# Patient Record
Sex: FEMALE | Race: WHITE | NOT HISPANIC OR LATINO | Employment: OTHER | URBAN - METROPOLITAN AREA
[De-identification: names, ages, dates, MRNs, and addresses within clinical notes are randomized per-mention and may not be internally consistent; named-entity substitution may affect disease eponyms.]

---

## 2017-03-01 RX ORDER — LOVASTATIN 20 MG/1
TABLET ORAL
Qty: 90 TABLET | Refills: 0 | Status: SHIPPED | OUTPATIENT
Start: 2017-03-01 | End: 2017-06-11 | Stop reason: SDUPTHER

## 2017-03-01 RX ORDER — BENAZEPRIL HYDROCHLORIDE 20 MG/1
TABLET ORAL
Qty: 180 TABLET | Refills: 0 | Status: SHIPPED | OUTPATIENT
Start: 2017-03-01 | End: 2017-06-01 | Stop reason: SDUPTHER

## 2017-03-01 RX ORDER — CILOSTAZOL 100 MG/1
TABLET ORAL
Qty: 180 TABLET | Refills: 0 | Status: SHIPPED | OUTPATIENT
Start: 2017-03-01 | End: 2017-06-01 | Stop reason: SDUPTHER

## 2017-03-01 RX ORDER — LEVOTHYROXINE SODIUM 88 UG/1
TABLET ORAL
Qty: 90 TABLET | Refills: 0 | Status: SHIPPED | OUTPATIENT
Start: 2017-03-01 | End: 2017-06-01 | Stop reason: SDUPTHER

## 2017-06-05 RX ORDER — CILOSTAZOL 100 MG/1
TABLET ORAL
Qty: 180 TABLET | Refills: 0 | Status: SHIPPED | OUTPATIENT
Start: 2017-06-05 | End: 2017-09-03 | Stop reason: SDUPTHER

## 2017-06-05 RX ORDER — LEVOTHYROXINE SODIUM 88 UG/1
TABLET ORAL
Qty: 90 TABLET | Refills: 0 | Status: SHIPPED | OUTPATIENT
Start: 2017-06-05 | End: 2017-08-22 | Stop reason: SDUPTHER

## 2017-06-05 RX ORDER — BENAZEPRIL HYDROCHLORIDE 20 MG/1
TABLET ORAL
Qty: 180 TABLET | Refills: 0 | Status: SHIPPED | OUTPATIENT
Start: 2017-06-05 | End: 2017-09-03 | Stop reason: SDUPTHER

## 2017-06-13 RX ORDER — LOVASTATIN 20 MG/1
TABLET ORAL
Qty: 90 TABLET | Refills: 0 | Status: SHIPPED | OUTPATIENT
Start: 2017-06-13 | End: 2017-09-03 | Stop reason: SDUPTHER

## 2017-06-14 RX ORDER — ALENDRONATE SODIUM 70 MG/1
70 TABLET ORAL WEEKLY
Qty: 12 TABLET | Refills: 0 | Status: SHIPPED | OUTPATIENT
Start: 2017-06-14 | End: 2017-09-18 | Stop reason: SDUPTHER

## 2017-07-26 ENCOUNTER — OFFICE VISIT (OUTPATIENT)
Dept: INTERNAL MEDICINE | Facility: CLINIC | Age: 82
End: 2017-07-26
Payer: MEDICARE

## 2017-07-26 ENCOUNTER — HOSPITAL ENCOUNTER (OUTPATIENT)
Dept: RADIOLOGY | Facility: HOSPITAL | Age: 82
Discharge: HOME | End: 2017-07-26
Attending: INTERNAL MEDICINE
Payer: MEDICARE

## 2017-07-26 ENCOUNTER — TELEPHONE (OUTPATIENT)
Dept: INTERNAL MEDICINE | Facility: CLINIC | Age: 82
End: 2017-07-26

## 2017-07-26 VITALS — DIASTOLIC BLOOD PRESSURE: 64 MMHG | OXYGEN SATURATION: 99 % | HEART RATE: 58 BPM | SYSTOLIC BLOOD PRESSURE: 154 MMHG

## 2017-07-26 DIAGNOSIS — E21.3 HYPERPARATHYROIDISM: Primary | ICD-10-CM

## 2017-07-26 DIAGNOSIS — I10 ESSENTIAL HYPERTENSION: ICD-10-CM

## 2017-07-26 DIAGNOSIS — M19.90 ARTHRITIS: ICD-10-CM

## 2017-07-26 DIAGNOSIS — E03.9 ACQUIRED HYPOTHYROIDISM: ICD-10-CM

## 2017-07-26 DIAGNOSIS — I34.0 MITRAL VALVE INSUFFICIENCY, UNSPECIFIED ETIOLOGY: ICD-10-CM

## 2017-07-26 PROCEDURE — 1159F MED LIST DOCD IN RCRD: CPT | Mod: ,,, | Performed by: INTERNAL MEDICINE

## 2017-07-26 PROCEDURE — 1157F ADVNC CARE PLAN IN RCRD: CPT | Mod: ,,, | Performed by: INTERNAL MEDICINE

## 2017-07-26 PROCEDURE — 73560 X-RAY EXAM OF KNEE 1 OR 2: CPT | Mod: TC,LT

## 2017-07-26 PROCEDURE — 99999 PR PBB SHADOW E&M-EST. PATIENT-LVL III: CPT | Mod: PBBFAC,,, | Performed by: INTERNAL MEDICINE

## 2017-07-26 PROCEDURE — 1125F AMNT PAIN NOTED PAIN PRSNT: CPT | Mod: ,,, | Performed by: INTERNAL MEDICINE

## 2017-07-26 PROCEDURE — 99214 OFFICE O/P EST MOD 30 MIN: CPT | Mod: S$PBB,,, | Performed by: INTERNAL MEDICINE

## 2017-07-26 PROCEDURE — 73560 X-RAY EXAM OF KNEE 1 OR 2: CPT | Mod: 26,LT,, | Performed by: RADIOLOGY

## 2017-07-26 NOTE — TELEPHONE ENCOUNTER
----- Message from Mark Seaman Jr., MD sent at 7/26/2017  3:41 PM CDT -----  Please contact the patient and report that the labs were unremarkable.  The x-ray continues to show significant arthritis in the knee, but no fracture or tumor.  There was no fluid in the knee.  Please also encourage the patient to participate in my [facility].

## 2017-07-26 NOTE — PROGRESS NOTES
Subjective:       Patient ID: Jayda Rendon is a 89 y.o. female.    Chief Complaint: Annual Exam    HPI the patient is a 89-year-old female comes in for general checkup.  Her main difficulty at this time is that of arthritic problems in her left knee causing it to give way periodically.  She uses a knee brace.  She has a walker which she uses.  However when she begins to go down she will slowly catch herself as she drops to the floor.  She has not hit her head or injured herself with these events.  The patient reports multiple home blood pressure readings in the range of 120/60.  She is brought in here by her family, Ez and Patricia.  Review of Systems   Constitutional: Negative for fatigue, fever and unexpected weight change.   HENT: Positive for hearing loss. Negative for congestion and sore throat.    Eyes: Negative for visual disturbance.   Respiratory: Negative for cough, shortness of breath and wheezing.    Cardiovascular: Negative for chest pain and palpitations.   Gastrointestinal: Negative for abdominal distention, abdominal pain, blood in stool, diarrhea, nausea and vomiting.   Genitourinary: Negative for difficulty urinating, dysuria, frequency and hematuria.   Musculoskeletal: Positive for arthralgias.   Skin: Negative for color change and rash.   Neurological: Positive for weakness. Negative for dizziness, syncope and headaches.   Hematological: Negative for adenopathy.   Psychiatric/Behavioral: Negative for confusion, decreased concentration and suicidal ideas.       Objective:      Physical Exam   Constitutional: She is oriented to person, place, and time. She appears well-developed and well-nourished.   HENT:   Head: Normocephalic.   Mouth/Throat: No oropharyngeal exudate.   Eyes: Conjunctivae and EOM are normal. Pupils are equal, round, and reactive to light. Right eye exhibits no discharge. Left eye exhibits no discharge. No scleral icterus.   Neck: No JVD present. No tracheal deviation  present. No thyromegaly present.   Cardiovascular: Normal rate and regular rhythm.  Exam reveals no gallop and no friction rub.    Murmur heard.  Prominent 4/6 systolic murmur heard throughout the precordium loudest in the axillary area.   Pulmonary/Chest: Effort normal and breath sounds normal. No respiratory distress. She has no wheezes. She has no rales. She exhibits no tenderness.   Abdominal: Soft. Bowel sounds are normal. She exhibits no distension and no mass. There is no tenderness. There is no rebound and no guarding.   Musculoskeletal: Normal range of motion. She exhibits edema. She exhibits no tenderness.   Trace pedal edema bilaterally.   Lymphadenopathy:     She has no cervical adenopathy.   Neurological: She is alert and oriented to person, place, and time. She displays normal reflexes. No cranial nerve deficit. She exhibits normal muscle tone. Coordination normal.   Skin: Skin is warm and dry. No rash noted. No erythema. No pallor.   Psychiatric: She has a normal mood and affect. Her behavior is normal. Judgment and thought content normal.       Assessment:       1. Hyperparathyroidism    2. Acquired hypothyroidism    3. Essential hypertension    4. Arthritis    5. Mitral valve insufficiency, unspecified etiology        Plan:       1.  CBC, CMP, TSH to further evaluate possible reversible causes for falls  2.  Check left knee x-ray to reevaluate osteoarthritic damage  3.  Rolling walker with a seat to avoid falling onto the floor  4.  Return to clinic in 3-4 months

## 2017-07-26 NOTE — PROGRESS NOTES
Please contact the patient and report that the labs were unremarkable.  The x-ray continues to show significant arthritis in the knee, but no fracture or tumor.  There was no fluid in the knee.  Please also encourage the patient to participate in my [facility].

## 2017-08-22 RX ORDER — LEVOTHYROXINE SODIUM 88 UG/1
TABLET ORAL
Qty: 90 TABLET | Refills: 3 | Status: SHIPPED | OUTPATIENT
Start: 2017-08-22 | End: 2018-08-23 | Stop reason: SDUPTHER

## 2017-09-05 RX ORDER — BENAZEPRIL HYDROCHLORIDE 20 MG/1
TABLET ORAL
Qty: 180 TABLET | Refills: 0 | Status: SHIPPED | OUTPATIENT
Start: 2017-09-05 | End: 2017-11-27 | Stop reason: SDUPTHER

## 2017-09-05 RX ORDER — CILOSTAZOL 100 MG/1
TABLET ORAL
Qty: 180 TABLET | Refills: 0 | Status: SHIPPED | OUTPATIENT
Start: 2017-09-05 | End: 2017-11-27 | Stop reason: SDUPTHER

## 2017-09-05 RX ORDER — LOVASTATIN 20 MG/1
TABLET ORAL
Qty: 90 TABLET | Refills: 0 | Status: SHIPPED | OUTPATIENT
Start: 2017-09-05 | End: 2017-11-27 | Stop reason: SDUPTHER

## 2017-09-18 RX ORDER — ALENDRONATE SODIUM 70 MG/1
TABLET ORAL
Qty: 12 TABLET | Refills: 0 | Status: SHIPPED | OUTPATIENT
Start: 2017-09-18 | End: 2017-12-11 | Stop reason: SDUPTHER

## 2017-11-13 ENCOUNTER — TELEPHONE (OUTPATIENT)
Dept: INTERNAL MEDICINE | Facility: CLINIC | Age: 82
End: 2017-11-13

## 2017-11-13 DIAGNOSIS — I10 HYPERTENSION, UNSPECIFIED TYPE: ICD-10-CM

## 2017-11-13 DIAGNOSIS — M81.0 OSTEOPOROSIS WITHOUT CURRENT PATHOLOGICAL FRACTURE, UNSPECIFIED OSTEOPOROSIS TYPE: Primary | ICD-10-CM

## 2017-11-13 DIAGNOSIS — M19.90 ARTHRITIS: ICD-10-CM

## 2017-11-27 RX ORDER — BENAZEPRIL HYDROCHLORIDE 20 MG/1
TABLET ORAL
Qty: 180 TABLET | Refills: 0 | Status: SHIPPED | OUTPATIENT
Start: 2017-11-27 | End: 2018-02-22 | Stop reason: SDUPTHER

## 2017-11-27 RX ORDER — LOVASTATIN 20 MG/1
TABLET ORAL
Qty: 90 TABLET | Refills: 0 | Status: SHIPPED | OUTPATIENT
Start: 2017-11-27 | End: 2018-02-22 | Stop reason: SDUPTHER

## 2017-11-27 RX ORDER — CILOSTAZOL 100 MG/1
TABLET ORAL
Qty: 180 TABLET | Refills: 0 | Status: SHIPPED | OUTPATIENT
Start: 2017-11-27 | End: 2018-02-22 | Stop reason: SDUPTHER

## 2017-11-29 ENCOUNTER — OFFICE VISIT (OUTPATIENT)
Dept: INTERNAL MEDICINE | Facility: CLINIC | Age: 82
End: 2017-11-29
Payer: MEDICARE

## 2017-11-29 VITALS
SYSTOLIC BLOOD PRESSURE: 132 MMHG | OXYGEN SATURATION: 98 % | DIASTOLIC BLOOD PRESSURE: 68 MMHG | BODY MASS INDEX: 22.22 KG/M2 | WEIGHT: 110 LBS | HEART RATE: 54 BPM

## 2017-11-29 DIAGNOSIS — I34.0 MITRAL VALVE INSUFFICIENCY, UNSPECIFIED ETIOLOGY: ICD-10-CM

## 2017-11-29 DIAGNOSIS — I10 ESSENTIAL HYPERTENSION: Primary | ICD-10-CM

## 2017-11-29 DIAGNOSIS — M19.90 ARTHRITIS: ICD-10-CM

## 2017-11-29 PROCEDURE — 99213 OFFICE O/P EST LOW 20 MIN: CPT | Mod: S$PBB,,, | Performed by: INTERNAL MEDICINE

## 2017-11-29 PROCEDURE — 99999 PR PBB SHADOW E&M-EST. PATIENT-LVL III: CPT | Mod: PBBFAC,,, | Performed by: INTERNAL MEDICINE

## 2017-11-29 PROCEDURE — 99213 OFFICE O/P EST LOW 20 MIN: CPT | Mod: PBBFAC | Performed by: INTERNAL MEDICINE

## 2017-11-29 NOTE — PROGRESS NOTES
Subjective:       Patient ID: Jayda Rendon is a 89 y.o. female.    Chief Complaint: Follow-up    HPI the patient is a 89-year-old female comes in for follow-up of her hypertension and arthritis.  She is now using a left knee brace to help support her knee.  She also uses a walker.  She has had only one fall and was uninjured.  The patient also complains of decreased hearing.  She is not interested in using hearing aid.  Review of Systems   Constitutional: Negative.  Negative for activity change, appetite change and fatigue.   HENT: Positive for hearing loss.    Respiratory: Negative for cough, chest tightness and shortness of breath.    Cardiovascular: Negative for chest pain and palpitations.       Objective:      Physical Exam   Constitutional: She appears well-developed and well-nourished. No distress.   HENT:   Right Ear: External ear normal.   Left Ear: External ear normal.   Cardiovascular: Normal rate and regular rhythm.  Exam reveals no gallop and no friction rub.    Murmur heard.  3/6 systolic murmur.   Pulmonary/Chest: Effort normal and breath sounds normal. No respiratory distress. She has no wheezes. She has no rales.   Skin: She is not diaphoretic.       Assessment:       1. Essential hypertension    2. Mitral valve insufficiency, unspecified etiology    3. Arthritis        Plan:       1.  Continue current medications  2.  Return to clinic in 4 months  3.  Consider audiogram and hearing aid evaluation.

## 2017-12-12 RX ORDER — ALENDRONATE SODIUM 70 MG/1
TABLET ORAL
Qty: 12 TABLET | Refills: 0 | Status: SHIPPED | OUTPATIENT
Start: 2017-12-12 | End: 2018-02-22 | Stop reason: SDUPTHER

## 2017-12-21 RX ORDER — AMLODIPINE BESYLATE 5 MG/1
TABLET ORAL
Qty: 180 TABLET | Refills: 0 | Status: SHIPPED | OUTPATIENT
Start: 2017-12-21 | End: 2018-02-22 | Stop reason: SDUPTHER

## 2018-02-22 RX ORDER — AMLODIPINE BESYLATE 5 MG/1
TABLET ORAL
Qty: 180 TABLET | Refills: 0 | Status: SHIPPED | OUTPATIENT
Start: 2018-02-22 | End: 2018-05-14 | Stop reason: SDUPTHER

## 2018-02-23 RX ORDER — BENAZEPRIL HYDROCHLORIDE 20 MG/1
TABLET ORAL
Qty: 180 TABLET | Refills: 0 | Status: SHIPPED | OUTPATIENT
Start: 2018-02-23 | End: 2018-05-14 | Stop reason: SDUPTHER

## 2018-02-23 RX ORDER — LOVASTATIN 20 MG/1
TABLET ORAL
Qty: 90 TABLET | Refills: 0 | Status: SHIPPED | OUTPATIENT
Start: 2018-02-23 | End: 2018-05-14 | Stop reason: SDUPTHER

## 2018-02-23 RX ORDER — CILOSTAZOL 100 MG/1
TABLET ORAL
Qty: 180 TABLET | Refills: 0 | Status: SHIPPED | OUTPATIENT
Start: 2018-02-23 | End: 2018-05-28 | Stop reason: SDUPTHER

## 2018-02-23 RX ORDER — ALENDRONATE SODIUM 70 MG/1
TABLET ORAL
Qty: 12 TABLET | Refills: 0 | Status: SHIPPED | OUTPATIENT
Start: 2018-02-23 | End: 2018-05-17 | Stop reason: SDUPTHER

## 2018-03-19 RX ORDER — AMLODIPINE BESYLATE 5 MG/1
TABLET ORAL
Qty: 180 TABLET | Refills: 0 | Status: SHIPPED | OUTPATIENT
Start: 2018-03-19 | End: 2018-04-04 | Stop reason: SDUPTHER

## 2018-04-04 ENCOUNTER — OFFICE VISIT (OUTPATIENT)
Dept: INTERNAL MEDICINE | Facility: CLINIC | Age: 83
End: 2018-04-04
Payer: MEDICARE

## 2018-04-04 ENCOUNTER — HOSPITAL ENCOUNTER (OUTPATIENT)
Dept: CARDIOLOGY | Facility: CLINIC | Age: 83
Discharge: HOME | End: 2018-04-04
Attending: INTERNAL MEDICINE
Payer: MEDICARE

## 2018-04-04 ENCOUNTER — TELEPHONE (OUTPATIENT)
Dept: INTERNAL MEDICINE | Facility: CLINIC | Age: 83
End: 2018-04-04

## 2018-04-04 VITALS
WEIGHT: 115.5 LBS | DIASTOLIC BLOOD PRESSURE: 64 MMHG | SYSTOLIC BLOOD PRESSURE: 146 MMHG | BODY MASS INDEX: 22.68 KG/M2 | OXYGEN SATURATION: 98 % | HEART RATE: 64 BPM | HEIGHT: 60 IN

## 2018-04-04 DIAGNOSIS — I35.0 NONRHEUMATIC AORTIC VALVE STENOSIS: ICD-10-CM

## 2018-04-04 DIAGNOSIS — I34.0 MITRAL VALVE INSUFFICIENCY, UNSPECIFIED ETIOLOGY: ICD-10-CM

## 2018-04-04 DIAGNOSIS — I10 ESSENTIAL HYPERTENSION: Primary | ICD-10-CM

## 2018-04-04 LAB
AORTIC VALVE STENOSIS: ABNORMAL
DIASTOLIC DYSFUNCTION: YES
ESTIMATED PA SYSTOLIC PRESSURE: 39.14
MITRAL VALVE MOBILITY: ABNORMAL
MITRAL VALVE REGURGITATION: ABNORMAL
RETIRED EF AND QEF - SEE NOTES: 60 (ref 55–65)
TRICUSPID VALVE REGURGITATION: ABNORMAL

## 2018-04-04 PROCEDURE — 99213 OFFICE O/P EST LOW 20 MIN: CPT | Mod: S$PBB,,, | Performed by: INTERNAL MEDICINE

## 2018-04-04 PROCEDURE — 99999 PR PBB SHADOW E&M-EST. PATIENT-LVL III: CPT | Mod: PBBFAC,,, | Performed by: INTERNAL MEDICINE

## 2018-04-04 PROCEDURE — 93306 TTE W/DOPPLER COMPLETE: CPT | Mod: PBBFAC | Performed by: INTERNAL MEDICINE

## 2018-04-04 PROCEDURE — 99213 OFFICE O/P EST LOW 20 MIN: CPT | Mod: PBBFAC | Performed by: INTERNAL MEDICINE

## 2018-04-04 RX ORDER — TRIAMTERENE AND HYDROCHLOROTHIAZIDE 37.5; 25 MG/1; MG/1
1 CAPSULE ORAL EVERY MORNING
Qty: 30 CAPSULE | Refills: 11 | Status: SHIPPED | OUTPATIENT
Start: 2018-04-04 | End: 2019-01-30 | Stop reason: SDUPTHER

## 2018-04-04 NOTE — TELEPHONE ENCOUNTER
----- Message from Mark Seaman Jr., MD sent at 4/4/2018 12:59 PM CDT -----  Please contact the patient and report that the labs were unremarkable.  Her blood count was slightly low but it was within the range that it usually is.  Please also encourage the patient to participate in my [facility].

## 2018-04-04 NOTE — PROGRESS NOTES
Subjective:       Patient ID: Jayda Rendon is a 89 y.o. female.    Chief Complaint: Follow-up    HPI the patient is a 89-year-old female comes in for multiple problems.  She is noticing worsening swelling in her ankles bilaterally.  However this has not been accompanied by significant shortness of breath.  She is not having any chest pain.  She does report that her left knee continues to be quite bothersome and painful.  Without the knee brace it tends to give way and will buckle.  She requested prescription for a new left knee brace.  The patient reports that her hearing is quite poor.  However she is not interested in getting tested nor and using a hearing aid.  Review of Systems   Constitutional: Negative.  Negative for activity change, appetite change and fatigue.   HENT: Positive for hearing loss.    Respiratory: Negative for cough, chest tightness and shortness of breath.    Cardiovascular: Positive for leg swelling. Negative for chest pain and palpitations.   Musculoskeletal: Positive for arthralgias.       Objective:      Physical Exam   Constitutional: She appears well-developed and well-nourished. No distress.   HENT:   Hearing loss bilaterally.   Cardiovascular: Normal rate and regular rhythm.  Exam reveals no gallop and no friction rub.    Murmur heard.  Prominent 5/6 systolic murmur heard throughout the precordium   Pulmonary/Chest: Effort normal and breath sounds normal. No respiratory distress. She has no wheezes. She has no rales.   Musculoskeletal: She exhibits edema.   2+ pale edema bilaterally   Skin: She is not diaphoretic.       Assessment:       1. Essential hypertension    2. Mitral valve insufficiency, unspecified etiology    3. Nonrheumatic aortic valve stenosis        Plan:       1.  Reevaluate cardiac function as well as valvular disease with repeat 2-D echocardiogram   2.  Dyazide one by mouth daily to improve blood pressure control as well as pedal edema  3.  Renew left knee  brace  4.  CBC, CMP  5.  Return to clinic in 3 months for follow-up of blood pressure, pedal edema

## 2018-04-10 ENCOUNTER — TELEPHONE (OUTPATIENT)
Dept: INTERNAL MEDICINE | Facility: CLINIC | Age: 83
End: 2018-04-10

## 2018-04-10 DIAGNOSIS — I38 VALVULAR HEART DISEASE: Primary | ICD-10-CM

## 2018-04-10 NOTE — TELEPHONE ENCOUNTER
----- Message from Mark Seaman Jr., MD sent at 4/10/2018  9:36 AM CDT -----  Please contact the patient and report that the echocardiogram showed slight worsening of the narrowing of her aortic valve. There is also dysfunction in how her heart muscle contracts. It will be important to maintain good blood pressure control.  I would also recommend following up with Dr. Sanderson who had seen her about a year and a half ago and had recommended that she return again around this time.  Please also encourage the patient to participate in my [facility].

## 2018-04-10 NOTE — PROGRESS NOTES
Please contact the patient and report that the echocardiogram showed slight worsening of the narrowing of her aortic valve. There is also dysfunction in how her heart muscle contracts. It will be important to maintain good blood pressure control.  I would also recommend following up with Dr. Sanderson who had seen her about a year and a half ago and had recommended that she return again around this time.  Please also encourage the patient to participate in my [facility].

## 2018-05-14 RX ORDER — AMLODIPINE BESYLATE 5 MG/1
5 TABLET ORAL 2 TIMES DAILY
Qty: 180 TABLET | Refills: 0 | Status: SHIPPED | OUTPATIENT
Start: 2018-05-14 | End: 2018-09-14 | Stop reason: SDUPTHER

## 2018-05-14 RX ORDER — BENAZEPRIL HYDROCHLORIDE 20 MG/1
20 TABLET ORAL 2 TIMES DAILY
Qty: 180 TABLET | Refills: 0 | Status: SHIPPED | OUTPATIENT
Start: 2018-05-14 | End: 2018-08-23 | Stop reason: SDUPTHER

## 2018-05-14 RX ORDER — LOVASTATIN 20 MG/1
20 TABLET ORAL NIGHTLY
Qty: 90 TABLET | Refills: 0 | Status: SHIPPED | OUTPATIENT
Start: 2018-05-14 | End: 2018-08-23 | Stop reason: SDUPTHER

## 2018-05-17 RX ORDER — ALENDRONATE SODIUM 70 MG/1
TABLET ORAL
Qty: 13 TABLET | Refills: 0 | Status: SHIPPED | OUTPATIENT
Start: 2018-05-17 | End: 2018-08-23 | Stop reason: SDUPTHER

## 2018-05-17 RX ORDER — ALENDRONATE SODIUM 70 MG/1
TABLET ORAL
Qty: 12 TABLET | Refills: 0 | Status: SHIPPED | OUTPATIENT
Start: 2018-05-17 | End: 2018-05-17 | Stop reason: SDUPTHER

## 2018-05-28 RX ORDER — CILOSTAZOL 100 MG/1
TABLET ORAL
Qty: 180 TABLET | Refills: 2 | Status: SHIPPED | OUTPATIENT
Start: 2018-05-28 | End: 2018-08-23 | Stop reason: SDUPTHER

## 2018-06-06 PROBLEM — I73.9 PAD (PERIPHERAL ARTERY DISEASE): Status: ACTIVE | Noted: 2018-06-06

## 2018-06-07 ENCOUNTER — OFFICE VISIT (OUTPATIENT)
Dept: CARDIOLOGY | Facility: CLINIC | Age: 83
End: 2018-06-07
Payer: MEDICARE

## 2018-06-07 VITALS
SYSTOLIC BLOOD PRESSURE: 129 MMHG | DIASTOLIC BLOOD PRESSURE: 61 MMHG | HEART RATE: 59 BPM | HEIGHT: 59 IN | BODY MASS INDEX: 21.51 KG/M2 | WEIGHT: 106.69 LBS

## 2018-06-07 DIAGNOSIS — I34.0 NON-RHEUMATIC MITRAL REGURGITATION: ICD-10-CM

## 2018-06-07 DIAGNOSIS — I10 ESSENTIAL HYPERTENSION: ICD-10-CM

## 2018-06-07 DIAGNOSIS — E03.8 OTHER SPECIFIED HYPOTHYROIDISM: ICD-10-CM

## 2018-06-07 DIAGNOSIS — E78.2 MIXED HYPERLIPIDEMIA: ICD-10-CM

## 2018-06-07 DIAGNOSIS — I73.9 PAD (PERIPHERAL ARTERY DISEASE): ICD-10-CM

## 2018-06-07 DIAGNOSIS — I35.0 NONRHEUMATIC AORTIC VALVE STENOSIS: Primary | ICD-10-CM

## 2018-06-07 PROCEDURE — 99213 OFFICE O/P EST LOW 20 MIN: CPT | Mod: PBBFAC | Performed by: INTERNAL MEDICINE

## 2018-06-07 PROCEDURE — 99999 PR PBB SHADOW E&M-EST. PATIENT-LVL III: CPT | Mod: PBBFAC,,, | Performed by: INTERNAL MEDICINE

## 2018-06-07 PROCEDURE — 99214 OFFICE O/P EST MOD 30 MIN: CPT | Mod: S$PBB,,, | Performed by: INTERNAL MEDICINE

## 2018-06-07 NOTE — LETTER
June 7, 2018      Mark Seaman Jr., MD  [address]  [place] [address]           [facility]  [address]  [place] [address]  Phone: [phone]          Patient: Jayda Rendon   MR Number: [id number]   YOB: 1924   Date of Visit: 6/7/2018       Dear Dr. Mark Seaman Jr.:    Thank you for referring Jayda Rendon to me for evaluation. Attached you will find relevant portions of my assessment and plan of care.    If you have questions, please do not hesitate to call me. I look forward to following Jayda Rendon along with you.    Sincerely,    Bandar Sanderson MD    Enclosure  CC:  No Recipients    If you would like to receive this communication electronically, please contact [email or web address] or [phone] to request more information on [employer] access.    For providers and/or their staff who would like to refer a patient to [facility], please contact us through our one-stop-shop provider referral line, [facility] , at [phone].    If you feel you have received this communication in error or would no longer like to receive these types of communications, please e-mail [email or web address]

## 2018-06-07 NOTE — PATIENT INSTRUCTIONS
Discussed diet , achieving and maintaining ideal body weight, and exercise.   We reviewed meds in detail.  Reassured-discussed goals, options, plan

## 2018-06-07 NOTE — PROGRESS NOTES
Subjective:   Patient ID:  Jayda Rendon is a 89 y.o. female who presents for follow-up of AS    HPI:  Patient is here for valvular heart disease and elevated BP.  The patient has no chest pain, SOB, TIA, palpitations, syncope or pre-syncope.Patient does not exercise but walks in house without symptoms.BP good now and mostly 125-135 range.        Review of Systems   Constitution: Negative for chills, decreased appetite, diaphoresis, fever, weakness, malaise/fatigue, night sweats, weight gain and weight loss.   HENT: Negative for congestion, hoarse voice, nosebleeds, sore throat and tinnitus.    Eyes: Negative for blurred vision, double vision, vision loss in left eye, vision loss in right eye, visual disturbance and visual halos.   Cardiovascular: Negative for chest pain, claudication, cyanosis, dyspnea on exertion, irregular heartbeat, leg swelling, near-syncope, orthopnea, palpitations, paroxysmal nocturnal dyspnea and syncope.   Respiratory: Negative for cough, hemoptysis, shortness of breath, sleep disturbances due to breathing, snoring, sputum production and wheezing.    Endocrine: Negative for cold intolerance, heat intolerance, polydipsia, polyphagia and polyuria.   Hematologic/Lymphatic: Negative for adenopathy and bleeding problem. Does not bruise/bleed easily.   Skin: Negative for color change, dry skin, flushing, itching, nail changes, poor wound healing, rash, skin cancer, suspicious lesions and unusual hair distribution.   Musculoskeletal: Negative for arthritis, back pain, falls, gout, joint pain, joint swelling, muscle cramps, muscle weakness, myalgias and stiffness.   Gastrointestinal: Negative for abdominal pain, anorexia, change in bowel habit, constipation, diarrhea, dysphagia, heartburn, hematemesis, hematochezia, melena and vomiting.   Genitourinary: Negative for decreased libido, dysuria, hematuria, hesitancy and urgency.   Neurological: Negative for excessive daytime sleepiness, dizziness,  "focal weakness, headaches, light-headedness, loss of balance, numbness, paresthesias, seizures, sensory change, tremors and vertigo.   Psychiatric/Behavioral: Negative for altered mental status, depression, hallucinations, memory loss, substance abuse and suicidal ideas. The patient does not have insomnia and is not nervous/anxious.    Allergic/Immunologic: Negative for environmental allergies and hives.       Objective: /61 (BP Location: Left arm, Patient Position: Sitting, BP Method: Pediatric (Automatic))   Pulse (!) 59   Ht 4' 11" (1.499 m)   Wt 48.4 kg (106 lb 11.2 oz)   BMI 21.55 kg/m²      Physical Exam   Constitutional: She is oriented to person, place, and time. She appears well-developed and well-nourished.   HENT:   Head: Normocephalic.   Eyes: EOM are normal. Pupils are equal, round, and reactive to light.   Neck: Normal range of motion. Normal carotid pulses, no hepatojugular reflux and no JVD present. Carotid bruit is not present. No thyromegaly present.   Cardiovascular: Normal rate, regular rhythm and intact distal pulses.  Exam reveals no gallop and no friction rub.    Murmur heard.   Systolic murmur is present with a grade of 2/6   Pulmonary/Chest: Effort normal and breath sounds normal. No tachypnea. No respiratory distress. She has no wheezes. She has no rales. She exhibits no tenderness.   Abdominal: Soft. Bowel sounds are normal. She exhibits no distension and no mass. There is no tenderness. There is no rebound and no guarding.   Musculoskeletal: Normal range of motion. She exhibits no edema or tenderness.   Lymphadenopathy:     She has no cervical adenopathy.   Neurological: She is alert and oriented to person, place, and time. No cranial nerve deficit. Coordination normal.   Skin: Skin is warm. No rash noted. No erythema.   Psychiatric: She has a normal mood and affect. Her behavior is normal. Judgment and thought content normal.       Assessment:     1. Nonrheumatic aortic valve " stenosis    2. Non-rheumatic mitral regurgitation    3. Essential hypertension    4. Mixed hyperlipidemia    5. Other specified hypothyroidism    6. PAD (peripheral artery disease)        Plan:   Discussed diet , achieving and maintaining ideal body weight, and exercise.   We reviewed meds in detail.  Reassured-discussed goals, options, plan      Jyada was seen today for valvular heart disease.    Diagnoses and all orders for this visit:    Nonrheumatic aortic valve stenosis  -     EKG 12-lead; Future; Expected date: 04/07/2019  -     2D echo with color flow doppler; Future; Expected date: 04/07/2019  -     Lipid panel; Future; Expected date: 03/07/2019  -     Comprehensive metabolic panel; Future; Expected date: 03/07/2019  -     TSH; Future; Expected date: 03/07/2019  -     T4, free; Future; Expected date: 03/07/2019    Non-rheumatic mitral regurgitation  -     EKG 12-lead; Future; Expected date: 04/07/2019  -     2D echo with color flow doppler; Future; Expected date: 04/07/2019    Essential hypertension  -     EKG 12-lead; Future; Expected date: 04/07/2019  -     2D echo with color flow doppler; Future; Expected date: 04/07/2019  -     Comprehensive metabolic panel; Future; Expected date: 03/07/2019  -     TSH; Future; Expected date: 03/07/2019  -     T4, free; Future; Expected date: 03/07/2019    Mixed hyperlipidemia  -     Lipid panel; Future; Expected date: 03/07/2019  -     Comprehensive metabolic panel; Future; Expected date: 03/07/2019  -     TSH; Future; Expected date: 03/07/2019  -     T4, free; Future; Expected date: 03/07/2019    Other specified hypothyroidism  -     TSH; Future; Expected date: 03/07/2019  -     T4, free; Future; Expected date: 03/07/2019    PAD (peripheral artery disease)            Follow-up in about 10 months (around 4/7/2019) for with labs, ECG and CFD Kin mitchell to read.

## 2018-07-24 ENCOUNTER — OFFICE VISIT (OUTPATIENT)
Dept: INTERNAL MEDICINE | Facility: CLINIC | Age: 83
End: 2018-07-24
Payer: MEDICARE

## 2018-07-24 VITALS — HEART RATE: 59 BPM | HEIGHT: 59 IN | SYSTOLIC BLOOD PRESSURE: 112 MMHG | DIASTOLIC BLOOD PRESSURE: 60 MMHG

## 2018-07-24 DIAGNOSIS — E03.8 OTHER SPECIFIED HYPOTHYROIDISM: ICD-10-CM

## 2018-07-24 DIAGNOSIS — I10 ESSENTIAL HYPERTENSION: ICD-10-CM

## 2018-07-24 DIAGNOSIS — E78.2 MIXED HYPERLIPIDEMIA: Primary | ICD-10-CM

## 2018-07-24 DIAGNOSIS — I35.0 NONRHEUMATIC AORTIC VALVE STENOSIS: ICD-10-CM

## 2018-07-24 PROCEDURE — 99214 OFFICE O/P EST MOD 30 MIN: CPT | Mod: S$PBB,,, | Performed by: PHYSICIAN ASSISTANT

## 2018-07-24 PROCEDURE — 99214 OFFICE O/P EST MOD 30 MIN: CPT | Mod: PBBFAC | Performed by: PHYSICIAN ASSISTANT

## 2018-07-24 PROCEDURE — 99999 PR PBB SHADOW E&M-EST. PATIENT-LVL IV: CPT | Mod: PBBFAC,,, | Performed by: PHYSICIAN ASSISTANT

## 2018-07-24 NOTE — PROGRESS NOTES
Subjective:       Patient ID: Jayda Rendon is a 89 y.o. female.        Chief Complaint: Follow-up    Jayda Rendon is an established patient of Dolly Grijalva MD here today for 3 month f/u visit.    HTN: dyazide added 3 months ago, BP much better, pedal edema resolved.      Bilateral knee pain: L>R, chronic, taking tylenol 650 BID.    Decreased hearing: chronic finding, no change, declines referral to ENT for audiogram.    Aortic stenosis: follows yearly with Dr. Sanderson.    Hypothyroidism: TSH normal 4/2018, on synthroid.      Lipids: on statin therapy.           Review of Systems   Constitutional: Negative for chills, diaphoresis, fatigue and fever.   HENT: Negative for congestion and sore throat.    Eyes: Negative for visual disturbance.   Respiratory: Negative for cough, chest tightness and shortness of breath.    Cardiovascular: Negative for chest pain, palpitations and leg swelling.   Gastrointestinal: Negative for abdominal pain, blood in stool, constipation, diarrhea, nausea and vomiting.   Genitourinary: Negative for dysuria, frequency, hematuria and urgency.   Musculoskeletal: Positive for arthralgias. Negative for back pain.   Skin: Negative for rash.   Neurological: Negative for dizziness, syncope, weakness and headaches.   Psychiatric/Behavioral: Negative for dysphoric mood and sleep disturbance. The patient is not nervous/anxious.        Objective:      Physical Exam   Constitutional: She appears well-developed and well-nourished. No distress.   HENT:   Head: Normocephalic and atraumatic.   Right Ear: Tympanic membrane and external ear normal.   Left Ear: Tympanic membrane and external ear normal.   Nose: Nose normal.   Mouth/Throat: Oropharynx is clear and moist.   Eyes: Conjunctivae are normal. Pupils are equal, round, and reactive to light.   Cardiovascular: Normal rate and regular rhythm.  Exam reveals no gallop.    Murmur heard.  Pulmonary/Chest: Effort normal and breath sounds normal. No  "respiratory distress.   Abdominal: Soft. Normal appearance. There is no tenderness. There is no rebound, no guarding and no CVA tenderness.   Musculoskeletal: She exhibits no edema.   Neurological: She is alert.   Skin: Skin is warm and dry. She is not diaphoretic.   Psychiatric: She has a normal mood and affect.   Nursing note and vitals reviewed.      Assessment:       1. Mixed hyperlipidemia    2. Other specified hypothyroidism    3. Nonrheumatic aortic valve stenosis    4. Essential hypertension        Plan:       Jayda was seen today for follow-up.    Diagnoses and all orders for this visit:    Mixed hyperlipidemia: continue statin therapy    Other specified hypothyroidism: TSH normal 4/2018, continue synthroid    Nonrheumatic aortic valve stenosis: followed yearly by Dr. Sanderson    Essential hypertension: stable and controlled, pedal edema resolved on dyazide    Pt has been given instructions populated from [employer] database and has verbalized understanding of the after visit summary and information contained wherein.    Follow up with a primary care provider. May go to ER for acute shortness of breath, lightheadedness, fever, or any other emergent complaints or changes in condition.    "This note will be shared with the patient"    No future appointments.            "

## 2018-08-08 ENCOUNTER — TELEPHONE (OUTPATIENT)
Dept: INTERNAL MEDICINE | Facility: CLINIC | Age: 83
End: 2018-08-08

## 2018-08-08 DIAGNOSIS — K62.89 RECTAL DISCOMFORT: Primary | ICD-10-CM

## 2018-08-08 NOTE — TELEPHONE ENCOUNTER
Patient is complaining of rectal discomfort.  Stating something is sticking her----patient would like to go see someone in [facility]----can you please put in order for her---thanks

## 2018-08-13 ENCOUNTER — OFFICE VISIT (OUTPATIENT)
Dept: SURGERY | Facility: CLINIC | Age: 83
End: 2018-08-13
Payer: MEDICARE

## 2018-08-13 VITALS
WEIGHT: 109.13 LBS | HEART RATE: 59 BPM | HEIGHT: 59 IN | BODY MASS INDEX: 22 KG/M2 | DIASTOLIC BLOOD PRESSURE: 65 MMHG | SYSTOLIC BLOOD PRESSURE: 140 MMHG

## 2018-08-13 DIAGNOSIS — K62.89 RECTAL NODULE: Primary | ICD-10-CM

## 2018-08-13 PROCEDURE — 99214 OFFICE O/P EST MOD 30 MIN: CPT | Mod: PBBFAC | Performed by: NURSE PRACTITIONER

## 2018-08-13 PROCEDURE — 99999 PR PBB SHADOW E&M-EST. PATIENT-LVL IV: CPT | Mod: PBBFAC,,, | Performed by: NURSE PRACTITIONER

## 2018-08-13 PROCEDURE — 99204 OFFICE O/P NEW MOD 45 MIN: CPT | Mod: S$PBB,,, | Performed by: NURSE PRACTITIONER

## 2018-08-13 RX ORDER — SODIUM CHLORIDE 9 MG/ML
INJECTION, SOLUTION INTRAVENOUS CONTINUOUS
Status: CANCELLED | OUTPATIENT
Start: 2018-08-13

## 2018-08-13 RX ORDER — HEPARIN SODIUM 5000 [USP'U]/ML
5000 INJECTION, SOLUTION INTRAVENOUS; SUBCUTANEOUS
Status: CANCELLED | OUTPATIENT
Start: 2018-08-13

## 2018-08-13 RX ORDER — MUPIROCIN 20 MG/G
OINTMENT TOPICAL
Status: CANCELLED | OUTPATIENT
Start: 2018-08-13

## 2018-08-13 RX ORDER — ACETAMINOPHEN 10 MG/ML
1000 INJECTION, SOLUTION INTRAVENOUS
Status: CANCELLED | OUTPATIENT
Start: 2018-08-13 | End: 2018-08-13

## 2018-08-13 NOTE — PROGRESS NOTES
Subjective:       Patient ID: Jayda Rendon is a 89 y.o. female.    Chief Complaint: No chief complaint on file.    HPI   89 F who presents to clinic with her niece and nephew for rectal pain for several weeks. Per family, she has started to have some memory loss so the majority of the history is obtained from them. SHe has been having     Review of Systems    Objective:      Physical Exam    Assessment:       No diagnosis found.    Plan:       ***

## 2018-08-13 NOTE — H&P
"History & Physical    SUBJECTIVE:     History of Present Illness:  89 F who presents to clinic with her niece and nephew for rectal pain for several weeks. Per family, she has started to have some memory loss so the majority of the history is obtained from them. She keeps saying she feels like something is "stuck" in her rectum. Pain with BMs. Denies any bleeding. Has a daily BM, taking miralax and colace daily. May occasionally use supportories, family unsure.     Family unsure if she has ever had a colonoscopy      Review of patient's allergies indicates:  No Known Allergies    Current Outpatient Medications   Medication Sig Dispense Refill    ACETAMINOPHEN (TYLENOL ARTHRITIS ORAL) Take 2 tablets by mouth daily as needed.        alendronate (FOSAMAX) 70 MG tablet TAKE 1 TABLET BY MOUTH 1 TIME A WEEK 13 tablet 0    amLODIPine (NORVASC) 5 MG tablet Take 1 tablet (5 mg total) by mouth 2 (two) times daily. 180 tablet 0    aspirin (ECOTRIN) 81 MG EC tablet Take 81 mg by mouth once daily.       benazepril (LOTENSIN) 20 MG tablet Take 1 tablet (20 mg total) by mouth 2 (two) times daily. 180 tablet 0    cholecalciferol, vitamin D3, 1,000 unit capsule Take 1,000 Units by mouth once daily.       cilostazol (PLETAL) 100 MG Tab TAKE 1 TABLET BY MOUTH TWICE DAILY 180 tablet 2    DOCUSATE SODIUM (COLACE ORAL) Take by mouth.      levothyroxine (SYNTHROID) 88 MCG tablet TAKE 1 TABLET BY MOUTH EVERY DAY 90 tablet 3    lovastatin (MEVACOR) 20 MG tablet Take 1 tablet (20 mg total) by mouth every evening. 90 tablet 0    multivitamin-minerals-lutein (CENTRUM SILVER) Tab Take 1 tablet by mouth once daily.       triamterene-hydrochlorothiazide 37.5-25 mg (DYAZIDE) 37.5-25 mg per capsule Take 1 capsule by mouth every morning. 30 capsule 11     No current facility-administered medications for this visit.        Past Medical History:   Diagnosis Date    Arthritis     Cancer     melanoma    Hyperlipidemia     " "Hyperparathyroidism     Hypertension     Joint pain     Peripheral vascular disease     worse on the right than the left    Thyroid disease     hypothyroidism     Past Surgical History:   Procedure Laterality Date    APPENDECTOMY      HEMORRHOID SURGERY      PEDICLE FLAP Right 6/9/2014    melolabial flap    WLE right cheek melanoma Right 6/9/2014     Family History   Problem Relation Age of Onset    Hypertension Mother     Cancer Mother     Hypertension Father     Cancer Father     Diabetes Maternal Grandmother     Cancer Sister         lung    Melanoma Neg Hx     Heart attack Neg Hx     Heart disease Neg Hx      Social History     Tobacco Use    Smoking status: Never Smoker    Smokeless tobacco: Never Used    Tobacco comment:  the patient walksabout her house with a walker.she is limited byinstabilityin her left knee. She is Renée's  grandmother.   Substance Use Topics    Alcohol use: No    Drug use: Not on file        Review of Systems:           Review of Systems  Hematology/Oncology:  Hematology Normal   Oncology Normal     Cardiovascular:   Hypertension   Renal/:  Renal/ Normal     Hepatic/GI:  Hepatic/GI Normal    Psych:   Confusion         OBJECTIVE:     Vital Signs (Most Recent)  Pulse: (!) 59 (08/13/18 1314)  BP: (!) 140/65 (08/13/18 1314)  4' 11" (1.499 m)  49.5 kg (109 lb 2 oz)     Physical Exam:    Physical Exam  General:  Well nourished    Airway/Jaw/Neck:  AIRWAY FINDINGS: Normal      Eyes/Ears/Nose:  EYES/EARS/NOSE FINDINGS: Normal   Dental:  DENTAL FINDINGS: Normal   Chest/Lungs:  Chest/Lungs Clear    Heart/Vascular:  Heart Findings: Normal Heart murmur: negative Vascular Findings: Normal    Abdomen:  Abdomen Findings: Normal Rectal exam: normal perianal skin, callused skin posteriorly  ALESSANDRO: posterior hard non mobile nodule palpated, tight sphincter tone  Unable to perform anoscopy due to discomfort and inability to advance scope  Patient examined with Dr Camilo as well due " to concerning exam   Musculoskeletal:  Musculoskeletal Findings: Normal   Skin:  Skin Findings: Normal    Mental Status:  Mental Status Findings: Normal          ASSESSMENT/PLAN:     Patient examined with Dr Camilo due to concerning exam - will undergo EUA with possible biopsy  8/17

## 2018-08-13 NOTE — LETTER
August 13, 2018      Sis Briggs PA-C  [address]  [facility] [address]           [facility]  [address]  [facility] [address]  Phone: [phone]          Patient: Jayda Rendon   MR Number: [id number]   YOB: 1924   Date of Visit: 8/13/2018       Dear Sis Briggs:    Thank you for referring Jayda Rendon to me for evaluation. Attached you will find relevant portions of my assessment and plan of care.    If you have questions, please do not hesitate to call me. I look forward to following Jayda Rendon along with you.    Sincerely,    Hermelinda Gary, NP    Enclosure  CC:  No Recipients    If you would like to receive this communication electronically, please contact [email or web address] or [phone] to request more information on [employer] Link access.    For providers and/or their staff who would like to refer a patient to [facility], please contact us through our one-stop-shop provider referral line, [facility] , at [phone].    If you feel you have received this communication in error or would no longer like to receive these types of communications, please e-mail [email or web address]

## 2018-08-16 ENCOUNTER — TELEPHONE (OUTPATIENT)
Dept: SURGERY | Facility: CLINIC | Age: 83
End: 2018-08-16

## 2018-08-16 NOTE — PRE-PROCEDURE INSTRUCTIONS
Preop instructions: NPO solids/ milk products  after midnight or clears up to 2 hours before arrival (clear liquids are: water, apple juice, Gatorade & Jello- avoid red or orange), shower instructions, directions, leave all valuables at home, medication instructions for PM prior & am of procedure explained. Nephew stated an understanding.      Nephew denies any side effects or issues with anesthesia or sedation.    Patient does remain on Pletal BID. Told nephew to hold PM dose tonight and in AM unless he hears otherwise, that I would be calling the clinic. Nephew stated an understanding. Left message with Dr Camilo's office to f/u with patient  .

## 2018-08-16 NOTE — TELEPHONE ENCOUNTER
Received call from [facility] [place]. Patient still on Pletal. Took am dose today. Surgery tomorrow. Per Dr. Camilo ok to proceed with EUA w/ possible biopsy tomorrow. Message left with [facility] [place]-   # [phone].

## 2018-08-17 ENCOUNTER — ANESTHESIA EVENT (OUTPATIENT)
Dept: SURGERY | Facility: HOSPITAL | Age: 83
End: 2018-08-17
Payer: MEDICARE

## 2018-08-17 ENCOUNTER — ANESTHESIA (OUTPATIENT)
Dept: SURGERY | Facility: HOSPITAL | Age: 83
End: 2018-08-17
Payer: MEDICARE

## 2018-08-17 ENCOUNTER — HOSPITAL ENCOUNTER (OUTPATIENT)
Facility: HOSPITAL | Age: 83
Discharge: HOME | End: 2018-08-17
Attending: COLON & RECTAL SURGERY | Admitting: COLON & RECTAL SURGERY
Payer: MEDICARE

## 2018-08-17 VITALS
HEIGHT: 59 IN | RESPIRATION RATE: 14 BRPM | OXYGEN SATURATION: 97 % | DIASTOLIC BLOOD PRESSURE: 61 MMHG | WEIGHT: 109 LBS | HEART RATE: 66 BPM | TEMPERATURE: 98 F | BODY MASS INDEX: 21.97 KG/M2 | SYSTOLIC BLOOD PRESSURE: 123 MMHG

## 2018-08-17 DIAGNOSIS — K62.89 RECTAL NODULE: ICD-10-CM

## 2018-08-17 PROCEDURE — 63600175 PHARM REV CODE 636 W HCPCS: Performed by: NURSE ANESTHETIST, CERTIFIED REGISTERED

## 2018-08-17 PROCEDURE — 36000707: Performed by: COLON & RECTAL SURGERY

## 2018-08-17 PROCEDURE — 71000044 HC DOSC ROUTINE RECOVERY FIRST HOUR: Performed by: COLON & RECTAL SURGERY

## 2018-08-17 PROCEDURE — D9220A PRA ANESTHESIA: Mod: ANES,,, | Performed by: ANESTHESIOLOGY

## 2018-08-17 PROCEDURE — 88305 TISSUE EXAM BY PATHOLOGIST: CPT | Mod: 26,,, | Performed by: PATHOLOGY

## 2018-08-17 PROCEDURE — 25000003 PHARM REV CODE 250: Performed by: NURSE PRACTITIONER

## 2018-08-17 PROCEDURE — 71000015 HC POSTOP RECOV 1ST HR: Performed by: COLON & RECTAL SURGERY

## 2018-08-17 PROCEDURE — 88305 TISSUE EXAM BY PATHOLOGIST: CPT | Performed by: PATHOLOGY

## 2018-08-17 PROCEDURE — 63600175 PHARM REV CODE 636 W HCPCS: Performed by: NURSE PRACTITIONER

## 2018-08-17 PROCEDURE — 25000003 PHARM REV CODE 250: Performed by: NURSE ANESTHETIST, CERTIFIED REGISTERED

## 2018-08-17 PROCEDURE — D9220A PRA ANESTHESIA: Mod: CRNA,,, | Performed by: NURSE ANESTHETIST, CERTIFIED REGISTERED

## 2018-08-17 PROCEDURE — 25000003 PHARM REV CODE 250: Performed by: COLON & RECTAL SURGERY

## 2018-08-17 PROCEDURE — 46270 REMOVE ANAL FIST SUBQ: CPT | Mod: ,,, | Performed by: COLON & RECTAL SURGERY

## 2018-08-17 PROCEDURE — 37000008 HC ANESTHESIA 1ST 15 MINUTES: Performed by: COLON & RECTAL SURGERY

## 2018-08-17 PROCEDURE — 37000009 HC ANESTHESIA EA ADD 15 MINS: Performed by: COLON & RECTAL SURGERY

## 2018-08-17 PROCEDURE — 36000706: Performed by: COLON & RECTAL SURGERY

## 2018-08-17 RX ORDER — SODIUM CHLORIDE 0.9 % (FLUSH) 0.9 %
3 SYRINGE (ML) INJECTION
Status: DISCONTINUED | OUTPATIENT
Start: 2018-08-17 | End: 2018-08-17 | Stop reason: HOSPADM

## 2018-08-17 RX ORDER — ACETAMINOPHEN 325 MG/1
650 TABLET ORAL EVERY 6 HOURS PRN
Status: DISCONTINUED | OUTPATIENT
Start: 2018-08-17 | End: 2018-08-17 | Stop reason: HOSPADM

## 2018-08-17 RX ORDER — ONDANSETRON 2 MG/ML
4 INJECTION INTRAMUSCULAR; INTRAVENOUS ONCE AS NEEDED
Status: DISCONTINUED | OUTPATIENT
Start: 2018-08-17 | End: 2018-08-17 | Stop reason: HOSPADM

## 2018-08-17 RX ORDER — PROPOFOL 10 MG/ML
VIAL (ML) INTRAVENOUS CONTINUOUS PRN
Status: DISCONTINUED | OUTPATIENT
Start: 2018-08-17 | End: 2018-08-17

## 2018-08-17 RX ORDER — LIDOCAINE HYDROCHLORIDE 10 MG/ML
1 INJECTION, SOLUTION EPIDURAL; INFILTRATION; INTRACAUDAL; PERINEURAL ONCE
Status: COMPLETED | OUTPATIENT
Start: 2018-08-17 | End: 2018-08-17

## 2018-08-17 RX ORDER — PROPOFOL 10 MG/ML
VIAL (ML) INTRAVENOUS
Status: DISCONTINUED | OUTPATIENT
Start: 2018-08-17 | End: 2018-08-17

## 2018-08-17 RX ORDER — HEPARIN SODIUM 5000 [USP'U]/ML
5000 INJECTION, SOLUTION INTRAVENOUS; SUBCUTANEOUS
Status: COMPLETED | OUTPATIENT
Start: 2018-08-17 | End: 2018-08-17

## 2018-08-17 RX ORDER — HYDROCODONE BITARTRATE AND ACETAMINOPHEN 5; 325 MG/1; MG/1
1 TABLET ORAL EVERY 6 HOURS PRN
Status: DISCONTINUED | OUTPATIENT
Start: 2018-08-17 | End: 2018-08-17 | Stop reason: HOSPADM

## 2018-08-17 RX ORDER — MUPIROCIN 20 MG/G
OINTMENT TOPICAL
Status: DISCONTINUED | OUTPATIENT
Start: 2018-08-17 | End: 2018-08-17 | Stop reason: HOSPADM

## 2018-08-17 RX ORDER — EPHEDRINE SULFATE 50 MG/ML
INJECTION, SOLUTION INTRAVENOUS
Status: DISCONTINUED | OUTPATIENT
Start: 2018-08-17 | End: 2018-08-17

## 2018-08-17 RX ORDER — BUPIVACAINE HYDROCHLORIDE 2.5 MG/ML
INJECTION, SOLUTION EPIDURAL; INFILTRATION; INTRACAUDAL
Status: DISCONTINUED | OUTPATIENT
Start: 2018-08-17 | End: 2018-08-17 | Stop reason: HOSPADM

## 2018-08-17 RX ORDER — SODIUM CHLORIDE 9 MG/ML
INJECTION, SOLUTION INTRAVENOUS CONTINUOUS
Status: DISCONTINUED | OUTPATIENT
Start: 2018-08-17 | End: 2018-08-17 | Stop reason: HOSPADM

## 2018-08-17 RX ORDER — HYDROCODONE BITARTRATE AND ACETAMINOPHEN 5; 325 MG/1; MG/1
1 TABLET ORAL EVERY 6 HOURS PRN
Qty: 15 TABLET | Refills: 0 | Status: SHIPPED | OUTPATIENT
Start: 2018-08-17 | End: 2018-09-17

## 2018-08-17 RX ORDER — ACETAMINOPHEN 10 MG/ML
1000 INJECTION, SOLUTION INTRAVENOUS
Status: COMPLETED | OUTPATIENT
Start: 2018-08-17 | End: 2018-08-17

## 2018-08-17 RX ORDER — FENTANYL CITRATE 50 UG/ML
INJECTION, SOLUTION INTRAMUSCULAR; INTRAVENOUS
Status: DISCONTINUED | OUTPATIENT
Start: 2018-08-17 | End: 2018-08-17

## 2018-08-17 RX ORDER — FENTANYL CITRATE 50 UG/ML
25 INJECTION, SOLUTION INTRAMUSCULAR; INTRAVENOUS EVERY 5 MIN PRN
Status: DISCONTINUED | OUTPATIENT
Start: 2018-08-17 | End: 2018-08-17 | Stop reason: HOSPADM

## 2018-08-17 RX ADMIN — Medication 800 MG: at 11:08

## 2018-08-17 RX ADMIN — MUPIROCIN: 20 OINTMENT TOPICAL at 10:08

## 2018-08-17 RX ADMIN — EPHEDRINE SULFATE 10 MG: 50 INJECTION, SOLUTION INTRAMUSCULAR; INTRAVENOUS; SUBCUTANEOUS at 11:08

## 2018-08-17 RX ADMIN — HEPARIN SODIUM 5000 UNITS: 5000 INJECTION, SOLUTION INTRAVENOUS; SUBCUTANEOUS at 10:08

## 2018-08-17 RX ADMIN — ACETAMINOPHEN 1000 MG: 10 INJECTION, SOLUTION INTRAVENOUS at 10:08

## 2018-08-17 RX ADMIN — PROPOFOL 10 MG: 10 INJECTION, EMULSION INTRAVENOUS at 11:08

## 2018-08-17 RX ADMIN — PROPOFOL 20 MG: 10 INJECTION, EMULSION INTRAVENOUS at 11:08

## 2018-08-17 RX ADMIN — FENTANYL CITRATE 25 MCG: 50 INJECTION, SOLUTION INTRAMUSCULAR; INTRAVENOUS at 11:08

## 2018-08-17 RX ADMIN — LIDOCAINE HYDROCHLORIDE 10 MG: 10 INJECTION, SOLUTION EPIDURAL; INFILTRATION; INTRACAUDAL; PERINEURAL at 10:08

## 2018-08-17 RX ADMIN — PROPOFOL 50 MCG/KG/MIN: 10 INJECTION, EMULSION INTRAVENOUS at 11:08

## 2018-08-17 RX ADMIN — SODIUM CHLORIDE: 0.9 INJECTION, SOLUTION INTRAVENOUS at 10:08

## 2018-08-17 NOTE — ANESTHESIA PREPROCEDURE EVALUATION
08/17/2018  Jayda Rendon is a 89 y.o., female.  Pre-operative evaluation for Exam under anesthesia (N/A), BIOPSY, RECTUM (N/A)    Chief Complaint: rectal nodule  PMH:  Advanced age  Mixed hyperlipidemia     hypothyroidism    Nonrheumatic aortic valve stenosis    Essential hypertension on dyazide    Severe aortic stenosis mitral regurg and LA enlargement.       Past Surgical History:   Procedure Laterality Date    APPENDECTOMY      HEMORRHOID SURGERY      PEDICLE FLAP Right 6/9/2014    melolabial flap    WLE right cheek melanoma Right 6/9/2014         Vital Signs Range (Last 24H):         CBC:   No results for input(s): WBC, RBC, HGB, HCT, PLT, MCV, MCH, MCHC in the last 720 hours.    CMP: No results for input(s): NA, K, CL, CO2, BUN, CREATININE, GLU, MG, PHOS, CALCIUM, ALBUMIN, PROT, ALKPHOS, ALT, AST, BILITOT in the last 720 hours.    INR:  No results for input(s): PT, INR, PROTIME, APTT in the last 720 hours.      Diagnostic Studies:      EKG:  Sinus bradycardia  LVH  T wave abnormality, consider anterior ischemia  Abnormal ECG  When compared with ECG of 06-JUL-2010 10:25,  No significant change was found  Confirmed by KERA SERRA MD ([id number]) on 5/23/2014 10:56:09 AM      2D Echo:   SIGNED BY: Elvira Wang MD On: 04/04/2018 16:00       Ref Range & Units 4mo ago 1yr ago   EF 55 - 65 60  65    Mitral Valve Regurgitation  MODERATE Abnormal   MODERATE Abnormal     Diastolic Dysfunction  Yes Abnormal   Yes Abnormal     Aortic Valve Stenosis  MODERATE TO SEVERE Abnormal   MODERATE Abnormal     Est. PA Systolic Pressure  39.14  33    Mitral Valve Mobility  PARTIALLY FLAIL     Tricuspid Valve Regurgitation  TRIVIAL TO MILD  MILD        Anesthesia Evaluation    I have reviewed the Patient Summary Reports.    I have reviewed the Nursing Notes.   I have reviewed the Medications.     Review of  Systems  Anesthesia Hx:  No problems with previous Anesthesia    Social:  Non-Smoker, No Alcohol Use    Cardiovascular:  Cardiovascular Normal     Pulmonary:  Pulmonary Normal    Neurological:  Neurology Normal        Physical Exam  General:  Well nourished    Airway/Jaw/Neck:  Airway Findings: Mouth Opening: Normal Tongue: Normal  General Airway Assessment: Good  Mallampati: III  Improves to II with phonation.  TM Distance: Normal, at least 6 cm  Jaw/Neck Findings:  Neck ROM: Normal ROM      Dental:  Dental Findings:    Chest/Lungs:  Chest/Lungs Findings: Clear to auscultation, Normal Respiratory Rate     Heart/Vascular:  Heart Findings: Rate: Normal  Rhythm: Regular Rhythm  Sounds: Normal  Heart Murmur  Systolic  Systolic Heart Murmur Description: L Upper Sternal Border  Systolic Heart Murmur Grade: Grade III        Mental Status:  Mental Status Findings:  Cooperative, Alert and Oriented         Anesthesia Plan  Type of Anesthesia, risks & benefits discussed:  Anesthesia Type:  MAC, general  Patient's Preference:   Intra-op Monitoring Plan: standard ASA monitors  Intra-op Monitoring Plan Comments:   Post Op Pain Control Plan: per primary service following discharge from PACU  Post Op Pain Control Plan Comments:   Induction:   IV  Beta Blocker:  Patient is not currently on a Beta-Blocker (No further documentation required).       Informed Consent: Patient understands risks and agrees with Anesthesia plan.  Questions answered. Anesthesia consent signed with patient.  ASA Score: 3     Day of Surgery Review of History & Physical:    H&P update referred to the surgeon.         Ready For Surgery From Anesthesia Perspective.

## 2018-08-17 NOTE — DISCHARGE INSTRUCTIONS
PATIENT INSTRUCTIONS  POST-ANESTHESIA    IMMEDIATELY FOLLOWING SURGERY:  Do not drive or operate machinery for the first twenty four hours after surgery.  Do not make any important decisions for twenty four hours after surgery or while taking narcotic pain medications or sedatives.  If you develop intractable nausea and vomiting or a severe headache please notify your doctor immediately.    FOLLOW-UP:  Please make an appointment with your surgeon as instructed. You do not need to follow up with anesthesia unless specifically instructed to do so.    WOUND CARE INSTRUCTIONS (if applicable):  Keep a dry clean dressing on the anesthesia/puncture wound site if there is drainage.  Once the wound has quit draining you may leave it open to air.  Generally you should leave the bandage intact for twenty four hours unless there is drainage.  If the epidural site drains for more than 36-48 hours please call the anesthesia department.    QUESTIONS?:  Please feel free to call your physician or the hospital  if you have any questions, and they will be happy to assist you.         Recovery After Procedural Sedation (Adult)  You have been given medicine by vein to make you sleep during your surgery. This may have included both a pain medicine and sleeping medicine. Most of the effects have worn off. But you may still have some drowsiness for the next 6 to 8 hours.  Home care  Follow these guidelines when you get home:  · For the next 8 hours, you should be watched by a responsible adult. This person should make sure your condition is not getting worse.  · Don't drink any alcohol for the next 24 hours.  · Don't drive, operate dangerous machinery, or make important business or personal decisions during the next 24 hours.  Note: Your healthcare provider may tell you not to take any medicine by mouth for pain or sleep in the next 4 hours. These medicines may react with the medicines you were given in the hospital. This could  cause a much stronger response than usual.  Follow-up care  Follow up with your healthcare provider if you are not alert and back to your usual level of activity within 12 hours.  When to seek medical advice  Call your healthcare provider right away if any of these occur:  · Drowsiness gets worse  · Weakness or dizziness gets worse  · Repeated vomiting  · You can't be awakened   Date Last Reviewed: 10/18/2016  © 8549-8163 [employer]. [address] [place] [address], [place] [address]. All rights reserved. This information is not intended as a substitute for professional medical care. Always follow your healthcare professional's instructions.

## 2018-08-17 NOTE — INTERVAL H&P NOTE
The patient has been examined and the H&P has been reviewed:    I concur with the findings and no changes have occurred since H&P was written.    Anesthesia/Surgery risks, benefits and alternative options discussed and understood by patient/family.          Active Hospital Problems    Diagnosis  POA    Rectal nodule [K62.89]  Yes      Resolved Hospital Problems   No resolved problems to display.

## 2018-08-17 NOTE — BRIEF OP NOTE
[facility]  Brief Operative Note     SUMMARY     Surgery Date: 8/17/2018     Surgeon(s) and Role:     * Jacobo Camilo MD - Primary     * Riki Nance MD - Resident - Assisting        Pre-op Diagnosis:  Rectal nodule [K62.89]    Post-op Diagnosis:  Post-Op Diagnosis Codes:     * Rectal nodule [K62.89]    Procedure(s) (LRB):  Exam under anesthesia (N/A)  FISTULOTOMY    Anesthesia: General    Techincal Procedure Performed: examination under anesthesia, fistulotomy, biopsy of perianal tissue    Findings/Key Components: chronic superficial left lateral anal fistula    Estimated Blood Loss: * No values recorded between 8/17/2018 11:22 AM and 8/17/2018 11:39 AM *         Specimens:   Specimen (12h ago, onward)    None          Discharge Note    SUMMARY     Admit Date: 8/17/2018    Discharge Date and Time:  08/17/2018 11:40 AM    Hospital Course (synopsis of major diagnoses, care, treatment, and services provided during the course of the hospital stay): Jayda Rendon is a 89 y.o. female who presented with a tender perianal mass. She was taken to the OR and a fistula with some adjacent necrotic/inflammed tissue was idenitifed. The procedure went well, she recovered in PACU and the patient was discharged home in stable condition    Final Diagnosis: Post-Op Diagnosis Codes:     * Rectal nodule [K62.89]    Disposition: Home or Self Care    Follow Up/Patient Instructions: F/U with Dr. Camilo in 3 weeks    Medications:  Reconciled Home Medications:      Medication List      ASK your doctor about these medications    alendronate 70 MG tablet  Commonly known as:  FOSAMAX  TAKE 1 TABLET BY MOUTH 1 TIME A WEEK     amLODIPine 5 MG tablet  Commonly known as:  NORVASC  Take 1 tablet (5 mg total) by mouth 2 (two) times daily.     aspirin 81 MG EC tablet  Commonly known as:  ECOTRIN  Take 81 mg by mouth once daily.     benazepril 20 MG tablet  Commonly known as:  LOTENSIN  Take 1 tablet (20 mg total)  by mouth 2 (two) times daily.     CENTRUM SILVER Tab  Generic drug:  multivitamin-minerals-lutein  Take 1 tablet by mouth once daily.     cholecalciferol (vitamin D3) 1,000 unit capsule  Take 1,000 Units by mouth once daily.     cilostazol 100 MG Tab  Commonly known as:  PLETAL  TAKE 1 TABLET BY MOUTH TWICE DAILY     COLACE ORAL  Take by mouth.     levothyroxine 88 MCG tablet  Commonly known as:  SYNTHROID  TAKE 1 TABLET BY MOUTH EVERY DAY     lovastatin 20 MG tablet  Commonly known as:  MEVACOR  Take 1 tablet (20 mg total) by mouth every evening.     triamterene-hydrochlorothiazide 37.5-25 mg 37.5-25 mg per capsule  Commonly known as:  DYAZIDE  Take 1 capsule by mouth every morning.     TYLENOL ARTHRITIS ORAL  Take 2 tablets by mouth daily as needed.          No discharge procedures on file.

## 2018-08-17 NOTE — PLAN OF CARE
Discharge instructions reviewed with pt and family. Understanding verbalized. Paper prescriptions given. No complaints of pain reported. Pt able to tolerate Po intake. To be transported by PCT to car.

## 2018-08-17 NOTE — TRANSFER OF CARE
"Anesthesia Transfer of Care Note    Patient: Jayda Rendon    Procedure(s) Performed: Procedure(s) (LRB):  Exam under anesthesia (N/A)  FISTULOTOMY    Patient location: [facility]    Anesthesia Type: general    Transport from OR: Transported from OR on room air with adequate spontaneous ventilation    Post pain: adequate analgesia    Post assessment: no apparent anesthetic complications and tolerated procedure well    Post vital signs: stable    Level of consciousness: awake and alert    Nausea/Vomiting: no nausea/vomiting    Complications: none    Transfer of care protocol was followed      Last vitals:   Visit Vitals  BP (!) 132/55 (BP Location: Right arm, Patient Position: Lying)   Pulse (!) 51   Temp 36.6 °C (97.8 °F) (Oral)   Resp 14   Ht 4' 11" (1.499 m)   Wt 49.4 kg (109 lb)   SpO2 97%   BMI 22.02 kg/m²     "

## 2018-08-17 NOTE — OP NOTE
DATE OF PROCEDURE:  08/17/2018    PREOPERATIVE DIAGNOSIS:  Left anal nodule.    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    SURGEON:  Jacobo Camilo M.D.    ASSISTANT:  Riki Nance M.D. (RES).    OPERATIONS PERFORMED:  Examination under anesthesia, perianal tissue biopsy,   fistulotomy, curette of chronic abscess cavity.    INDICATIONS FOR PROCEDURE:  The patient is a 89-year-old female who presented   with several weeks of worsening anal pain.  She felt something was stuck in her   rectum and was having pain with bowel movements.    DESCRIPTION OF PROCEDURE:  The patient was identified in the preoperative   holding area as Jayda Rendon and taken back to the Operating Room.  Here, she was   put into a modified Coyne position on her left side, and after induction of   anesthesia, the patient was prepped and draped in the usual sterile fashion.  At   this point, timeout was called with all agreeing on the correct patient, site,   and procedure.  An anal block was performed using bupivacaine at this point and   a rectal exam was performed noting that there was some tightness at the anal   verge.  Attention was turned to the area of induration on the left side of the   patient's anus.  There appeared to be a sinus within this and it was gently   probed revealing a fistulous tract to the superficial anal canal.  This was   carefully palpated and noted to contain little or no musculature, so this was   opened using electrocautery.  Once the area was opened, some necrotic tissue was   noted to be present within this what appeared to be a chronic abscess cavity   and this was curetted out.  At this point, rectal exam was repeated with no   further tightness or stenosis appreciated and no further masses identified.    Some of the tissue in this chronic cavity was biopsied and sent for pathology   prior to curetting.  At this point, all bleeding was controlled  with   electrocautery and the patient's anus was packed with Gelfoam.  The patient was   then awoken and the procedure was brought to an end.  A 4 x 4's and surgical   briefs were placed and the patient was returned to PACU to recover.  All sponge,   needle and instrument counts were correct x2.  Dr. Camilo was present and   scrubbed for the entire operation.    DICTATED BY:  Riki Nance M.D. (RES)      ALK/IN  dd: 08/17/2018 12:03:38 (CDT)  td: 08/17/2018 12:32:10 (CDT)  Doc ID   #[id number]  Job ID #[id number]    CC:

## 2018-08-17 NOTE — ANESTHESIA POSTPROCEDURE EVALUATION
"Anesthesia Post Evaluation    Patient: Jayda Rendon    Procedure(s) Performed: Procedure(s) (LRB):  Exam under anesthesia (N/A)  FISTULOTOMY    Final Anesthesia Type: general  Patient location during evaluation: PACU  Patient participation: Yes- Able to Participate  Level of consciousness: awake and alert and oriented  Post-procedure vital signs: reviewed and stable  Pain management: adequate  Airway patency: patent  PONV status at discharge: No PONV  Anesthetic complications: no      Cardiovascular status: hemodynamically stable  Respiratory status: unassisted  Hydration status: euvolemic  Follow-up not needed.        Visit Vitals  BP (!) 109/58   Pulse 89   Temp 36.2 °C (97.2 °F) (Skin)   Resp 12   Ht 4' 11" (1.499 m)   Wt 49.4 kg (109 lb)   SpO2 96%   BMI 22.02 kg/m²       Pain/Mike Score: Pain Assessment Performed: Yes (8/17/2018 11:43 AM)  Presence of Pain: denies (8/17/2018 11:43 AM)  Pain Rating Prior to Med Admin: 5 (8/17/2018 10:10 AM)  Mike Score: 9 (8/17/2018 11:43 AM)        "

## 2018-08-23 RX ORDER — LEVOTHYROXINE SODIUM 88 UG/1
88 TABLET ORAL DAILY
Qty: 90 TABLET | Refills: 1 | Status: SHIPPED | OUTPATIENT
Start: 2018-08-23 | End: 2019-01-30 | Stop reason: SDUPTHER

## 2018-08-23 RX ORDER — LOVASTATIN 20 MG/1
20 TABLET ORAL NIGHTLY
Qty: 90 TABLET | Refills: 1 | Status: SHIPPED | OUTPATIENT
Start: 2018-08-23 | End: 2019-07-29 | Stop reason: SDUPTHER

## 2018-08-23 RX ORDER — ALENDRONATE SODIUM 70 MG/1
TABLET ORAL
Qty: 13 TABLET | Refills: 1 | Status: SHIPPED | OUTPATIENT
Start: 2018-08-23 | End: 2019-07-29 | Stop reason: SDUPTHER

## 2018-08-23 RX ORDER — BENAZEPRIL HYDROCHLORIDE 20 MG/1
20 TABLET ORAL 2 TIMES DAILY
Qty: 180 TABLET | Refills: 1 | Status: SHIPPED | OUTPATIENT
Start: 2018-08-23 | End: 2019-01-08

## 2018-08-23 RX ORDER — CILOSTAZOL 100 MG/1
100 TABLET ORAL 2 TIMES DAILY
Qty: 180 TABLET | Refills: 1 | Status: SHIPPED | OUTPATIENT
Start: 2018-08-23 | End: 2019-01-30 | Stop reason: SDUPTHER

## 2018-09-05 ENCOUNTER — TELEPHONE (OUTPATIENT)
Dept: SURGERY | Facility: CLINIC | Age: 83
End: 2018-09-05

## 2018-09-05 NOTE — TELEPHONE ENCOUNTER
----- Message from Anh Briones sent at 9/5/2018  9:55 AM CDT -----  Contact: Nephew- Don- [phone]  Leslee- thao nephew called to schedule his aunt's post op appt- pt had surgery 8/17- not sure how far out it should be- please contact Ramiundo at [phone]

## 2018-09-14 RX ORDER — AMLODIPINE BESYLATE 5 MG/1
TABLET ORAL
Qty: 180 TABLET | Refills: 0 | Status: SHIPPED | OUTPATIENT
Start: 2018-09-14 | End: 2018-12-17 | Stop reason: SDUPTHER

## 2018-09-17 ENCOUNTER — OFFICE VISIT (OUTPATIENT)
Dept: SURGERY | Facility: CLINIC | Age: 83
End: 2018-09-17
Payer: MEDICARE

## 2018-09-17 VITALS — WEIGHT: 103.81 LBS | HEIGHT: 59 IN | BODY MASS INDEX: 20.93 KG/M2

## 2018-09-17 DIAGNOSIS — Z09 POSTOP CHECK: Primary | ICD-10-CM

## 2018-09-17 DIAGNOSIS — K62.89 RECTAL NODULE: ICD-10-CM

## 2018-09-17 PROCEDURE — 99024 POSTOP FOLLOW-UP VISIT: CPT | Mod: POP,,, | Performed by: COLON & RECTAL SURGERY

## 2018-09-17 PROCEDURE — 99212 OFFICE O/P EST SF 10 MIN: CPT | Mod: PBBFAC | Performed by: COLON & RECTAL SURGERY

## 2018-09-17 PROCEDURE — 99999 PR PBB SHADOW E&M-EST. PATIENT-LVL II: CPT | Mod: PBBFAC,,, | Performed by: COLON & RECTAL SURGERY

## 2018-09-18 NOTE — PROGRESS NOTES
"Status post excision of perianal lesion 1 month ago.  She had continued to experience pain especially with sitting until about 1 week ago and then the past 3-5 days had no pain at all.  Normal bowel function.  No bleeding.      Exam:......Ht 4' 11" (1.499 m)   Wt 47.1 kg (103 lb 13.4 oz)   BMI 20.97 kg/m²      Well-appearing.  Anorectal:  Well healed, no evidence of infection.          FINAL PATHOLOGIC DIAGNOSIS  PERIANAL SKIN AND SUBCUTANEOUS TISSUE SHOWING MARKED ULCERATION, FIBROSIS AND  REACTIVE CHANGES PRESENT, NO MALIGNANCY IDENTIFIED.  NOTE: THIS CASE WAS REVIEWED BY DR. CHATTERJEE WHO CONCURS WITH THE DIAGNOSIS.      Assessment/plan:  Doing well postop. follow-up as needed  "

## 2018-10-16 ENCOUNTER — IMMUNIZATION (OUTPATIENT)
Dept: INTERNAL MEDICINE | Facility: CLINIC | Age: 83
End: 2018-10-16
Payer: MEDICARE

## 2018-10-16 PROCEDURE — 90662 IIV NO PRSV INCREASED AG IM: CPT | Mod: PBBFAC

## 2018-11-28 ENCOUNTER — TELEPHONE (OUTPATIENT)
Dept: INTERNAL MEDICINE | Facility: CLINIC | Age: 83
End: 2018-11-28

## 2018-11-28 DIAGNOSIS — K62.89 PERIANAL PAIN: Primary | ICD-10-CM

## 2018-11-28 NOTE — TELEPHONE ENCOUNTER
"C/o feeling a "sticking" feeling in the rectal area - h/o perianal lesion. Needs referral to Dr. Camilo in colorectal.  "

## 2018-12-17 ENCOUNTER — OFFICE VISIT (OUTPATIENT)
Dept: SURGERY | Facility: CLINIC | Age: 83
End: 2018-12-17
Payer: MEDICARE

## 2018-12-17 VITALS
WEIGHT: 104.25 LBS | HEART RATE: 62 BPM | HEIGHT: 59 IN | DIASTOLIC BLOOD PRESSURE: 67 MMHG | SYSTOLIC BLOOD PRESSURE: 158 MMHG | BODY MASS INDEX: 21.02 KG/M2

## 2018-12-17 DIAGNOSIS — K62.89 ANAL PAIN: Primary | ICD-10-CM

## 2018-12-17 PROCEDURE — 99999 PR PBB SHADOW E&M-EST. PATIENT-LVL III: CPT | Mod: PBBFAC,,, | Performed by: COLON & RECTAL SURGERY

## 2018-12-17 PROCEDURE — 99213 OFFICE O/P EST LOW 20 MIN: CPT | Mod: PBBFAC | Performed by: COLON & RECTAL SURGERY

## 2018-12-17 PROCEDURE — 99212 OFFICE O/P EST SF 10 MIN: CPT | Mod: S$PBB,,, | Performed by: COLON & RECTAL SURGERY

## 2018-12-17 RX ORDER — AMLODIPINE BESYLATE 5 MG/1
TABLET ORAL
Qty: 180 TABLET | Refills: 0 | Status: SHIPPED | OUTPATIENT
Start: 2018-12-17 | End: 2019-01-30 | Stop reason: SDUPTHER

## 2018-12-17 NOTE — PROGRESS NOTES
"Subjective:       Patient ID: Jayda Rendon is a 89 y.o. female.    Chief Complaint: Perianal Pain    HPI established patient. Prior EUA and superficial fistulotomy and anal biopsy.  Path was benign.  She done well until the last month when she has had trace blood on toilet paper and heard a clicking" noise.       Review of Systems   Constitutional: Negative for chills and fever.   Respiratory: Negative for cough and shortness of breath.    Cardiovascular: Negative for chest pain and palpitations.   Gastrointestinal: Negative for nausea and vomiting.   Genitourinary: Negative for dysuria and urgency.   Neurological: Negative for seizures and numbness.       Objective:      Physical Exam   Constitutional: She is oriented to person, place, and time. She appears well-developed and well-nourished.   Eyes: Conjunctivae and EOM are normal.   Pulmonary/Chest: Effort normal. No respiratory distress.   Abdominal: Soft. She exhibits no distension.   Genitourinary:   Genitourinary Comments: Anorectal Exam:     Perianal skin: normal    ALESSANDRO: Normal resting and squeeze tone.  No masses. Nontender. Mild stenosis    Anoscopy:  Normal distal rectal mucosa. Internal hemorrhoids without stigmata of bleeding or prolapse.    Musculoskeletal: Normal range of motion. She exhibits no edema.   Neurological: She is alert and oriented to person, place, and time.   Skin: Skin is warm and dry.   Psychiatric: She has a normal mood and affect. Her behavior is normal.       Assessment:       1. Anal pain        Plan:       Increase Miralax. F/u prn     "

## 2018-12-17 NOTE — LETTER
December 19, 2018      Dolly Grijalva MD  [address]  [facility] [address]           [facility]  [address]  [facility] [address]  Phone: [phone]          Patient: Jayda Rendon   MR Number: [id number]   YOB: 1924   Date of Visit: 12/17/2018       Dear Dr. Dolly Grijalva:    Thank you for referring Jayda Rendon to me for evaluation. Attached you will find relevant portions of my assessment and plan of care.    If you have questions, please do not hesitate to call me. I look forward to following Jayda Rendon along with you.    Sincerely,    Jacobo Camilo MD    Enclosure  CC:  No Recipients    If you would like to receive this communication electronically, please contact [email or web address] or [phone] to request more information on [employer] Link access.    For providers and/or their staff who would like to refer a patient to [facility], please contact us through our one-stop-shop provider referral line, [facility], at [phone].    If you feel you have received this communication in error or would no longer like to receive these types of communications, please e-mail [email or web address]

## 2019-01-08 ENCOUNTER — OFFICE VISIT (OUTPATIENT)
Dept: INTERNAL MEDICINE | Facility: CLINIC | Age: 84
End: 2019-01-08
Payer: MEDICARE

## 2019-01-08 ENCOUNTER — LAB VISIT (OUTPATIENT)
Dept: LAB | Facility: HOSPITAL | Age: 84
End: 2019-01-08
Attending: INTERNAL MEDICINE
Payer: MEDICARE

## 2019-01-08 ENCOUNTER — TELEPHONE (OUTPATIENT)
Dept: INTERNAL MEDICINE | Facility: CLINIC | Age: 84
End: 2019-01-08

## 2019-01-08 VITALS
HEIGHT: 59 IN | HEART RATE: 62 BPM | WEIGHT: 104.75 LBS | TEMPERATURE: 98 F | SYSTOLIC BLOOD PRESSURE: 122 MMHG | DIASTOLIC BLOOD PRESSURE: 62 MMHG | BODY MASS INDEX: 21.12 KG/M2

## 2019-01-08 DIAGNOSIS — I10 BENIGN ESSENTIAL HYPERTENSION: ICD-10-CM

## 2019-01-08 DIAGNOSIS — H61.23 CERUMINOSIS, BILATERAL: ICD-10-CM

## 2019-01-08 DIAGNOSIS — M81.0 OSTEOPOROSIS, UNSPECIFIED OSTEOPOROSIS TYPE, UNSPECIFIED PATHOLOGICAL FRACTURE PRESENCE: ICD-10-CM

## 2019-01-08 DIAGNOSIS — E78.2 MIXED HYPERLIPIDEMIA: ICD-10-CM

## 2019-01-08 DIAGNOSIS — N18.30 CKD (CHRONIC KIDNEY DISEASE) STAGE 3, GFR 30-59 ML/MIN: ICD-10-CM

## 2019-01-08 DIAGNOSIS — I73.9 PAD (PERIPHERAL ARTERY DISEASE): ICD-10-CM

## 2019-01-08 DIAGNOSIS — D69.2 SENILE PURPURA: ICD-10-CM

## 2019-01-08 DIAGNOSIS — I10 BENIGN ESSENTIAL HYPERTENSION: Primary | ICD-10-CM

## 2019-01-08 DIAGNOSIS — D75.89 MACROCYTOSIS: Primary | ICD-10-CM

## 2019-01-08 DIAGNOSIS — N25.81 HYPERPARATHYROIDISM DUE TO RENAL INSUFFICIENCY: ICD-10-CM

## 2019-01-08 DIAGNOSIS — D64.9 NORMOCYTIC ANEMIA: ICD-10-CM

## 2019-01-08 DIAGNOSIS — H91.93 BILATERAL HEARING LOSS, UNSPECIFIED HEARING LOSS TYPE: ICD-10-CM

## 2019-01-08 DIAGNOSIS — M25.562 CHRONIC PAIN OF LEFT KNEE: ICD-10-CM

## 2019-01-08 DIAGNOSIS — I35.0 NONRHEUMATIC AORTIC VALVE STENOSIS: ICD-10-CM

## 2019-01-08 DIAGNOSIS — R27.0 ATAXIA: ICD-10-CM

## 2019-01-08 DIAGNOSIS — E03.9 HYPOTHYROIDISM, UNSPECIFIED TYPE: ICD-10-CM

## 2019-01-08 DIAGNOSIS — G89.29 CHRONIC PAIN OF LEFT KNEE: ICD-10-CM

## 2019-01-08 LAB
ALBUMIN SERPL BCP-MCNC: 3.9 G/DL
ALP SERPL-CCNC: 76 U/L
ALT SERPL W/O P-5'-P-CCNC: 13 U/L
ANION GAP SERPL CALC-SCNC: 6 MMOL/L
AST SERPL-CCNC: 14 U/L
BASOPHILS # BLD AUTO: 0.05 K/UL
BASOPHILS NFR BLD: 0.8 %
BILIRUB SERPL-MCNC: 0.3 MG/DL
BUN SERPL-MCNC: 42 MG/DL
CALCIUM SERPL-MCNC: 10.1 MG/DL
CHLORIDE SERPL-SCNC: 111 MMOL/L
CO2 SERPL-SCNC: 24 MMOL/L
CREAT SERPL-MCNC: 1.3 MG/DL
DIFFERENTIAL METHOD: ABNORMAL
EOSINOPHIL # BLD AUTO: 0.1 K/UL
EOSINOPHIL NFR BLD: 1.6 %
ERYTHROCYTE [DISTWIDTH] IN BLOOD BY AUTOMATED COUNT: 14.7 %
EST. GFR  (AFRICAN AMERICAN): 41 ML/MIN/1.73 M^2
EST. GFR  (NON AFRICAN AMERICAN): 35 ML/MIN/1.73 M^2
FERRITIN SERPL-MCNC: 29 NG/ML
GLUCOSE SERPL-MCNC: 125 MG/DL
HCT VFR BLD AUTO: 31.1 %
HGB BLD-MCNC: 9.5 G/DL
IRON SERPL-MCNC: 47 UG/DL
LYMPHOCYTES # BLD AUTO: 1.1 K/UL
LYMPHOCYTES NFR BLD: 18.1 %
MCH RBC QN AUTO: 31.4 PG
MCHC RBC AUTO-ENTMCNC: 30.5 G/DL
MCV RBC AUTO: 103 FL
MONOCYTES # BLD AUTO: 0.5 K/UL
MONOCYTES NFR BLD: 7.4 %
NEUTROPHILS # BLD AUTO: 4.5 K/UL
NEUTROPHILS NFR BLD: 71.9 %
PLATELET # BLD AUTO: 214 K/UL
PMV BLD AUTO: 9.9 FL
POTASSIUM SERPL-SCNC: 4.8 MMOL/L
PROT SERPL-MCNC: 7.1 G/DL
RBC # BLD AUTO: 3.03 M/UL
SATURATED IRON: 12 %
SODIUM SERPL-SCNC: 141 MMOL/L
TOTAL IRON BINDING CAPACITY: 407 UG/DL
TRANSFERRIN SERPL-MCNC: 275 MG/DL
TSH SERPL DL<=0.005 MIU/L-ACNC: 0.62 UIU/ML
WBC # BLD AUTO: 6.24 K/UL

## 2019-01-08 PROCEDURE — 99999 PR PBB SHADOW E&M-EST. PATIENT-LVL III: ICD-10-PCS | Mod: PBBFAC,,, | Performed by: INTERNAL MEDICINE

## 2019-01-08 PROCEDURE — 69210 PR REMOVAL IMPACTED CERUMEN REQUIRING INSTRUMENTATION, UNILATERAL: ICD-10-PCS | Mod: S$PBB,,, | Performed by: INTERNAL MEDICINE

## 2019-01-08 PROCEDURE — 80053 COMPREHEN METABOLIC PANEL: CPT

## 2019-01-08 PROCEDURE — 69210 REMOVE IMPACTED EAR WAX UNI: CPT | Mod: S$PBB,,, | Performed by: INTERNAL MEDICINE

## 2019-01-08 PROCEDURE — 69210 REMOVE IMPACTED EAR WAX UNI: CPT | Mod: PBBFAC | Performed by: INTERNAL MEDICINE

## 2019-01-08 PROCEDURE — 99213 OFFICE O/P EST LOW 20 MIN: CPT | Mod: PBBFAC | Performed by: INTERNAL MEDICINE

## 2019-01-08 PROCEDURE — 99999 PR PBB SHADOW E&M-EST. PATIENT-LVL III: CPT | Mod: PBBFAC,,, | Performed by: INTERNAL MEDICINE

## 2019-01-08 PROCEDURE — 36415 COLL VENOUS BLD VENIPUNCTURE: CPT

## 2019-01-08 PROCEDURE — 83540 ASSAY OF IRON: CPT

## 2019-01-08 PROCEDURE — 84443 ASSAY THYROID STIM HORMONE: CPT

## 2019-01-08 PROCEDURE — 99215 OFFICE O/P EST HI 40 MIN: CPT | Mod: S$PBB,25,, | Performed by: INTERNAL MEDICINE

## 2019-01-08 PROCEDURE — 82728 ASSAY OF FERRITIN: CPT

## 2019-01-08 PROCEDURE — 99215 PR OFFICE/OUTPT VISIT, EST, LEVL V, 40-54 MIN: ICD-10-PCS | Mod: S$PBB,25,, | Performed by: INTERNAL MEDICINE

## 2019-01-08 PROCEDURE — 85025 COMPLETE CBC W/AUTO DIFF WBC: CPT

## 2019-01-08 RX ORDER — OLMESARTAN MEDOXOMIL 20 MG/1
20 TABLET ORAL 2 TIMES DAILY
Qty: 180 TABLET | Refills: 3 | Status: SHIPPED | OUTPATIENT
Start: 2019-01-08 | End: 2019-01-10

## 2019-01-08 RX ORDER — DOCUSATE SODIUM 100 MG/1
100 CAPSULE, LIQUID FILLED ORAL DAILY
COMMUNITY

## 2019-01-08 NOTE — PROGRESS NOTES
INTERNAL MEDICINE INITIAL VISIT NOTE      CHIEF COMPLAINT     Est care    HPI     Jayda Rendon is a 89 y.o. C female who presents to est care. Accompanied by niece, Esther, and grandniece, Renée.     Perianal pain - saw Dr. Camilo 12/2018 and rec to inc miralax and f/u prn.  H/o perianal lesion excision, which showed ulceration and fibrosis 8/2018.  Chronic constipation. She's been having bowel movements.     HTN - diazide 37.5-25mg daily, amlodipine 5mg daily.    B knee pains, L>R.  Wears brace daily and takes it off at night.   Walks w/ walker all the time.   Tylenol 650mg BID.   No recent falls.     Mod to severe AS - follows yearly w/ Dr. Sanderson, last seen 6/2018  EF 60-65%. Grade 2 diastolyc dysfunction.     Hypothyroidism - synthroid 88mcg daily  TSH 7/2017 WNL.     HLD - lovastatin 20mg daily.    PAD - pletal 100mg daily.     Osteoporosis - alendronate 70mg weekly.   Last DEXA 2014.    Normocytic anemia.     CKD3. Cr 0.9-1.1    Lives w/ sister. 3 nephews and 1 niece. 2 of the nephews spend the nights rotationally there. 3 days a week, there's a sitter. The other times niece, Esther and the other nephew spends day time w/ her and her sister. There's someone w/ her 24 hours a day. Pt was never .   Has never been evaluated for hearing aids.     Past Medical History:  Past Medical History:   Diagnosis Date    Arthritis     Cancer     melanoma    Hyperlipidemia     Hyperparathyroidism     Hypertension     Joint pain     Peripheral vascular disease     worse on the right than the left    Thyroid disease     hypothyroidism       Past Surgical History:  Past Surgical History:   Procedure Laterality Date    APPENDECTOMY      Exam under anesthesia N/A 8/17/2018    Performed by Jacobo Camilo MD at [facility] OR 2ND FLR    EXCISION-LESION-FACE Right 6/9/2014    Performed by Roberto Carlos Mcdowell MD at [facility] OR 2ND FLR    FISTULOTOMY  8/17/2018    [facility]     HEMORRHOID SURGERY      PEDICLE FLAP Right 6/9/2014    melolabial flap    WLE right cheek melanoma Right 6/9/2014       Allergies:  Review of patient's allergies indicates:  No Known Allergies    Home Medications:  Prior to Admission medications    Medication Sig Start Date End Date Taking? Authorizing Provider   ACETAMINOPHEN (TYLENOL ARTHRITIS ORAL) Take 2 tablets by mouth daily as needed.      Historical Provider, MD   alendronate (FOSAMAX) 70 MG tablet TAKE 1 TABLET BY MOUTH 1 TIME A WEEK 8/23/18   Brandee Sierra MD   amLODIPine (NORVASC) 5 MG tablet TAKE 1 TABLET(5 MG) BY MOUTH TWICE DAILY 12/17/18   Dolly Grijalva MD   aspirin (ECOTRIN) 81 MG EC tablet Take 81 mg by mouth once daily.     Historical Provider, MD   benazepril (LOTENSIN) 20 MG tablet Take 1 tablet (20 mg total) by mouth 2 (two) times daily. 8/23/18   Brandee Sierra MD   cholecalciferol, vitamin D3, 1,000 unit capsule Take 1,000 Units by mouth once daily.     Historical Provider, MD   cilostazol (PLETAL) 100 MG Tab Take 1 tablet (100 mg total) by mouth 2 (two) times daily. 8/23/18   Brandee Sierra MD   docusate sodium (STOOL SOFTENER ORAL) Take by mouth.    Historical Provider, MD   levothyroxine (SYNTHROID) 88 MCG tablet Take 1 tablet (88 mcg total) by mouth once daily. 8/23/18   Brandee Sierra MD   lovastatin (MEVACOR) 20 MG tablet Take 1 tablet (20 mg total) by mouth every evening. 8/23/18   Brandee Sierra MD   multivitamin-minerals-lutein (CENTRUM SILVER) Tab Take 1 tablet by mouth once daily.     Historical Provider, MD   polyethylene glycol 3350 (MIRALAX ORAL) Take by mouth.    Historical Provider, MD   triamterene-hydrochlorothiazide 37.5-25 mg (DYAZIDE) 37.5-25 mg per capsule Take 1 capsule by mouth every morning. 4/4/18 4/4/19  Mark Seaman Jr., MD       Family History:  Family History   Problem Relation Age of Onset    Hypertension Mother     Cancer Mother     Hypertension Father     Cancer Father   "   Diabetes Maternal Grandmother     Cancer Sister         lung    Melanoma Neg Hx     Heart attack Neg Hx     Heart disease Neg Hx        Social History:  Social History     Tobacco Use    Smoking status: Never Smoker    Smokeless tobacco: Never Used    Tobacco comment:  the patient walksabout her house with a walker.she is limited byinstabilityin her left knee. She is Renée's  grandmother.   Substance Use Topics    Alcohol use: No    Drug use: Not on file       Review of Systems:  Review of Systems Comprehensive review of systems otherwise negative. See history/subjective section for more details.    Health Maintainence:    reviewed.     PHYSICAL EXAM     /62 (BP Location: Left arm, Patient Position: Sitting, BP Method: Pediatric (Manual))   Pulse 62   Temp 98.2 °F (36.8 °C)   Ht 4' 11" (1.499 m)   Wt 47.5 kg (104 lb 11.5 oz)   BMI 21.15 kg/m²     GEN - A+O, NAD   HEENT - PERRL, EOMI, OP clear. MMM. B ceruminosis. Hard of hearing.  Neck - No thyromegaly or cervical LAD. No thyroid masses felt.  CV - RRR, III/VI LAUREN best at LLSB.  Chest - CTAB, no wheezing or rhonchi  Abd - S/NT/ND/+BS.   Ext - palp BDP and radial pulses. No LE edema.   Neuro - 4/5 BUE and BLE strength. Sensation to light touch intact throughout. antalgic gait.   MSK - B shoulder abduction to about 90 degrees. Decreased joint spaces of B knees and shoulders. L knee brace in place.   Skin - BUE w/ bruising.     LABS     Previous labs reviewed.    ASSESSMENT/PLAN     Jayda Rendon is a 89 y.o. female with  Jayda was seen today for annual exam.    Diagnoses and all orders for this visit:    Benign essential hypertension - change benazepril to olmesartan.  -     Comprehensive metabolic panel; Future; Expected date: 01/08/2019    Chronic pain of left knee - reports no pain today. Uses L knee brace daily.     Nonrheumatic aortic valve stenosis - f/u w/ Dr. Sanderson.     Hypothyroidism, unspecified type - cont synthroid.   -     " TSH; Future; Expected date: 01/08/2019    Mixed hyperlipidemia - cont lovastatin.  -     Comprehensive metabolic panel; Future; Expected date: 01/08/2019    PAD (peripheral artery disease) - cont lovastatin and asa 81mg daily.   -     Comprehensive metabolic panel; Future; Expected date: 01/08/2019    Osteoporosis, unspecified osteoporosis type, unspecified pathological fracture presence    CKD (chronic kidney disease) stage 3, GFR 30-59 ml/min - change benazepril to olmesartan.   -     Comprehensive metabolic panel; Future; Expected date: 01/08/2019    Normocytic anemia  -     CBC auto differential; Future; Expected date: 01/08/2019  -     Ferritin; Future; Expected date: 01/08/2019  -     Iron and TIBC; Future; Expected date: 01/08/2019    Bilateral hearing loss, unspecified hearing loss type  -     Comprehensive audiogram; Future    Ceruminosis, bilateral - cerumen removed B via cerumen spoon.     Senile purpura - stable.     Hyperparathyroidism due to renal insufficiency - likely due to CKD3. Will get CMP.     Other orders  -     docusate sodium (COLACE) 100 MG capsule; Take 100 mg by mouth once daily.      RTC in 6 months, sooner if needed and depending on labs.    Dolly Grijalva MD  Department of Internal Medicine - [facility]  8:14 AM

## 2019-01-09 ENCOUNTER — TELEPHONE (OUTPATIENT)
Dept: INTERNAL MEDICINE | Facility: CLINIC | Age: 84
End: 2019-01-09

## 2019-01-09 DIAGNOSIS — G30.1 LATE ONSET ALZHEIMER'S DISEASE WITHOUT BEHAVIORAL DISTURBANCE: ICD-10-CM

## 2019-01-09 DIAGNOSIS — D53.9 MACROCYTIC ANEMIA: Primary | ICD-10-CM

## 2019-01-09 DIAGNOSIS — R27.0 ATAXIA: ICD-10-CM

## 2019-01-09 DIAGNOSIS — N18.30 CKD (CHRONIC KIDNEY DISEASE) STAGE 3, GFR 30-59 ML/MIN: ICD-10-CM

## 2019-01-09 DIAGNOSIS — F02.80 LATE ONSET ALZHEIMER'S DISEASE WITHOUT BEHAVIORAL DISTURBANCE: ICD-10-CM

## 2019-01-09 NOTE — TELEPHONE ENCOUNTER
Please call and notify pt:    Her anemia is worse than what it was 9 mo ago, which was worse than a year ago. Her iron levels are ok. I'd like for her to get a B12 and folate levels and a repeat blood count to make sure those look ok. Her kidney function has also slightly worsened from April. Will also check that again.

## 2019-01-10 ENCOUNTER — TELEPHONE (OUTPATIENT)
Dept: INTERNAL MEDICINE | Facility: CLINIC | Age: 84
End: 2019-01-10

## 2019-01-10 DIAGNOSIS — I10 BENIGN ESSENTIAL HYPERTENSION: Primary | ICD-10-CM

## 2019-01-10 RX ORDER — OLMESARTAN MEDOXOMIL 40 MG/1
40 TABLET ORAL DAILY
Qty: 90 TABLET | Refills: 3 | Status: ON HOLD | OUTPATIENT
Start: 2019-01-10 | End: 2019-09-23 | Stop reason: HOSPADM

## 2019-01-10 NOTE — TELEPHONE ENCOUNTER
----- Message from Sally Krishnamurthy sent at 1/9/2019 10:49 AM CST -----  Contact: [place] [phone]      ----- Message -----  From: Sally Krishnamurthy  Sent: 1/9/2019  10:48 AM  To: Valentine Alberto Staff    Prior Authorization Needed    Medication: olmesartan (BENICAR) 20 MG tablet    Pharmacy Info: [facility] [address] [place] [address] [facility] [address] AT [facility]    Plan does not cover this medication. Please call plan at [phone] to initiate prior authorization or call/fax pharmacy to change medication. Patient ID#[id number]    Note chart when prior authorization has been submitted.    Please notify pharmacy when prior authorization has been approved.    Thank You

## 2019-01-10 NOTE — TELEPHONE ENCOUNTER
----- Message from Sally Krishnamurthy sent at 1/10/2019  8:37 AM CST -----  Contact: [place] [phone]  Prescription Clarification:     The pharmacy needs clarity on the following Rx:    olmesartan (BENICAR) 20 MG tablet    Insurance will only cover 1 per day. Please change to 40 mg 1 PO QD or 40 mg 1/2 PO QD for insurance to cover.    [facility] [address] [place] [address] [facility] AT [facility]    Thank You

## 2019-01-10 NOTE — TELEPHONE ENCOUNTER
Called and spoke to Patricia letting her know about change in medication and dosage.  She verbalized understanding.

## 2019-01-16 ENCOUNTER — LAB VISIT (OUTPATIENT)
Dept: LAB | Facility: HOSPITAL | Age: 84
End: 2019-01-16
Attending: INTERNAL MEDICINE
Payer: MEDICARE

## 2019-01-16 DIAGNOSIS — N18.30 CKD (CHRONIC KIDNEY DISEASE) STAGE 3, GFR 30-59 ML/MIN: ICD-10-CM

## 2019-01-16 DIAGNOSIS — F02.80 LATE ONSET ALZHEIMER'S DISEASE WITHOUT BEHAVIORAL DISTURBANCE: ICD-10-CM

## 2019-01-16 DIAGNOSIS — D53.9 MACROCYTIC ANEMIA: ICD-10-CM

## 2019-01-16 DIAGNOSIS — G30.1 LATE ONSET ALZHEIMER'S DISEASE WITHOUT BEHAVIORAL DISTURBANCE: ICD-10-CM

## 2019-01-16 DIAGNOSIS — R27.0 ATAXIA: ICD-10-CM

## 2019-01-16 LAB
ANION GAP SERPL CALC-SCNC: 8 MMOL/L
BASOPHILS # BLD AUTO: 0.03 K/UL
BASOPHILS NFR BLD: 0.5 %
BUN SERPL-MCNC: 35 MG/DL
CALCIUM SERPL-MCNC: 10.1 MG/DL
CHLORIDE SERPL-SCNC: 111 MMOL/L
CO2 SERPL-SCNC: 22 MMOL/L
CREAT SERPL-MCNC: 1.3 MG/DL
DIFFERENTIAL METHOD: ABNORMAL
EOSINOPHIL # BLD AUTO: 0.1 K/UL
EOSINOPHIL NFR BLD: 1.8 %
ERYTHROCYTE [DISTWIDTH] IN BLOOD BY AUTOMATED COUNT: 14.3 %
EST. GFR  (AFRICAN AMERICAN): 41 ML/MIN/1.73 M^2
EST. GFR  (NON AFRICAN AMERICAN): 35 ML/MIN/1.73 M^2
FOLATE SERPL-MCNC: >40 NG/ML
GLUCOSE SERPL-MCNC: 101 MG/DL
HCT VFR BLD AUTO: 30.5 %
HGB BLD-MCNC: 9.2 G/DL
LYMPHOCYTES # BLD AUTO: 1.3 K/UL
LYMPHOCYTES NFR BLD: 22.3 %
MCH RBC QN AUTO: 31.1 PG
MCHC RBC AUTO-ENTMCNC: 30.2 G/DL
MCV RBC AUTO: 103 FL
MONOCYTES # BLD AUTO: 0.6 K/UL
MONOCYTES NFR BLD: 9.8 %
NEUTROPHILS # BLD AUTO: 3.7 K/UL
NEUTROPHILS NFR BLD: 65.6 %
PLATELET # BLD AUTO: 207 K/UL
PMV BLD AUTO: 9.4 FL
POTASSIUM SERPL-SCNC: 4.6 MMOL/L
RBC # BLD AUTO: 2.96 M/UL
SODIUM SERPL-SCNC: 141 MMOL/L
TSH SERPL DL<=0.005 MIU/L-ACNC: 0.75 UIU/ML
VIT B12 SERPL-MCNC: 602 PG/ML
WBC # BLD AUTO: 5.61 K/UL

## 2019-01-16 PROCEDURE — 82607 VITAMIN B-12: CPT

## 2019-01-16 PROCEDURE — 82746 ASSAY OF FOLIC ACID SERUM: CPT

## 2019-01-16 PROCEDURE — 86592 SYPHILIS TEST NON-TREP QUAL: CPT

## 2019-01-16 PROCEDURE — 85025 COMPLETE CBC W/AUTO DIFF WBC: CPT

## 2019-01-16 PROCEDURE — 36415 COLL VENOUS BLD VENIPUNCTURE: CPT

## 2019-01-16 PROCEDURE — 84443 ASSAY THYROID STIM HORMONE: CPT

## 2019-01-16 PROCEDURE — 80048 BASIC METABOLIC PNL TOTAL CA: CPT

## 2019-01-17 ENCOUNTER — TELEPHONE (OUTPATIENT)
Dept: INTERNAL MEDICINE | Facility: CLINIC | Age: 84
End: 2019-01-17

## 2019-01-17 DIAGNOSIS — D53.9 MACROCYTIC ANEMIA: Primary | ICD-10-CM

## 2019-01-17 LAB — RPR SER QL: NORMAL

## 2019-01-17 NOTE — TELEPHONE ENCOUNTER
----- Message from Jt Matthews sent at 1/17/2019  4:02 PM CST -----  Contact: [phone]  Type: Returning a call    Who left a message? Huong     When did the practice call? 01/17    Comments: please call and advise, Thanks

## 2019-01-17 NOTE — TELEPHONE ENCOUNTER
Please call and notify pt:    Vitamin B12 and folate levels are sufficient.   Her anemia is slightly worse compared to a week ago. I'd like for her to follow up with hematology for further work up of this anemia as her anemia has progressively worsened over the years (hgb was about 12 in 2017 then 10.7 2018 and now 9.2). Electrolytes are ok. Kidney function is stable. Thyroid level is normal.

## 2019-01-18 ENCOUNTER — TELEPHONE (OUTPATIENT)
Dept: INTERNAL MEDICINE | Facility: CLINIC | Age: 84
End: 2019-01-18

## 2019-01-18 NOTE — TELEPHONE ENCOUNTER
Spoke with patricia advised of results. Patricia would like to know if there is anything that can be done first before having to see hematology. She says it is very hard to get them to appts. She says if it is necessary she will schedule an appt but would like to know what you think?

## 2019-01-18 NOTE — TELEPHONE ENCOUNTER
So far, she's not iron, B12 or folate deficient so unfortunately the anemia is not something that con be corrected simply by taking a supplement. It's worrisome that the hemoglobin trends downward each year so I'd recommend an eval (doesn't have to be done right away) at some point to see if we can figure out why she's anemic and to prevent this from worsening.

## 2019-01-28 RX ORDER — LOVASTATIN 20 MG/1
TABLET ORAL
Qty: 90 TABLET | Refills: 3 | Status: SHIPPED | OUTPATIENT
Start: 2019-01-28

## 2019-01-30 RX ORDER — CILOSTAZOL 100 MG/1
100 TABLET ORAL 2 TIMES DAILY
Qty: 180 TABLET | Refills: 3 | Status: ON HOLD | OUTPATIENT
Start: 2019-01-30 | End: 2019-09-23 | Stop reason: HOSPADM

## 2019-01-30 RX ORDER — AMLODIPINE BESYLATE 5 MG/1
TABLET ORAL
Qty: 180 TABLET | Refills: 3 | Status: SHIPPED | OUTPATIENT
Start: 2019-01-30 | End: 2019-07-29

## 2019-01-30 RX ORDER — LEVOTHYROXINE SODIUM 88 UG/1
88 TABLET ORAL DAILY
Qty: 90 TABLET | Refills: 3 | Status: SHIPPED | OUTPATIENT
Start: 2019-01-30

## 2019-01-30 RX ORDER — TRIAMTERENE AND HYDROCHLOROTHIAZIDE 37.5; 25 MG/1; MG/1
1 CAPSULE ORAL EVERY MORNING
Qty: 90 CAPSULE | Refills: 3 | Status: SHIPPED | OUTPATIENT
Start: 2019-01-30 | End: 2019-07-30

## 2019-03-19 NOTE — PROGRESS NOTES
History:     Reason For Consultation:   Anemia    Referring Provider:   Dolly Grijalva MD  [address] [place] [address]    Records Obtained: Records of the patients history including those obtained from the referring provider were reviewed and summarized in detail.    HPI:   Jayda Rendon presents for consultation of anemai. Patient had labs done recently which showed hemoglobin 9.2, white blood cells 5.61 and platelets 207, , ANC 3.7. Iron studies showed iron 47, iron sat 12, TIBC 407 and ferritin 29. Vitamin B12 602, folate > 40. Creatinine 1.3. The anemia is relatively new since 2017 with progression more recently. Her renal function has worsened over the past year as well. Her two nephews care for her. She has poor hearing. She's in a wheelchair now, but ambulates at home some. She and her 97 year old sister live together and have 24 hour care.     She denies dark black stools/bright red blood per rectum.   She denies chest pain  She denies shortness of breath    Past Medical   Past Medical History:   Diagnosis Date    Arthritis     Cancer     melanoma    Hyperlipidemia     Hyperparathyroidism     Hypertension     Joint pain     Peripheral vascular disease     worse on the right than the left    Thyroid disease     hypothyroidism     Patient Active Problem List   Diagnosis    Arthritis    Hyperlipidemia    Hyperparathyroidism    Hypothyroidism    Osteoporosis    Melanoma    Nonrheumatic aortic valve stenosis    Mitral regurgitation    Essential hypertension    PAD (peripheral artery disease)    Rectal nodule     Social History   Social History     Socioeconomic History    Marital status: Single     Spouse name: Not on file    Number of children: Not on file    Years of education: Not on file    Highest education level: Not on file   Social Needs    Financial resource strain: Not on file    Food insecurity - worry: Not on file    Food insecurity - inability: Not on  file    Transportation needs - medical: Not on file    Transportation needs - non-medical: Not on file   Occupational History     Employer: OTHER   Tobacco Use    Smoking status: Never Smoker    Smokeless tobacco: Never Used    Tobacco comment:  the patient walksabout her house with a walker.she is limited byinstabilityin her left knee. She is Renée's  grandmother.   Substance and Sexual Activity    Alcohol use: No    Drug use: No    Sexual activity: No   Other Topics Concern    Are you pregnant or think you may be? No    Breast-feeding Not Asked   Social History Narrative    Not on file     Family History  Family History   Problem Relation Age of Onset    Hypertension Mother     Cancer Mother     Hypertension Father     Cancer Father     No Known Problems Maternal Grandmother     Cancer Sister         lung    No Known Problems Maternal Aunt     No Known Problems Maternal Uncle     No Known Problems Paternal Aunt     No Known Problems Paternal Uncle     No Known Problems Maternal Grandfather     No Known Problems Paternal Grandmother     No Known Problems Paternal Grandfather     No Known Problems Sister     Melanoma Neg Hx     Heart attack Neg Hx     Heart disease Neg Hx      Medications    Current Outpatient Medications:     ACETAMINOPHEN (TYLENOL ARTHRITIS ORAL), Take 2 tablets by mouth daily as needed.  , Disp: , Rfl:     alendronate (FOSAMAX) 70 MG tablet, TAKE 1 TABLET BY MOUTH 1 TIME A WEEK, Disp: 13 tablet, Rfl: 1    amLODIPine (NORVASC) 5 MG tablet, TAKE 1 TABLET(5 MG) BY MOUTH TWICE DAILY, Disp: 180 tablet, Rfl: 3    aspirin (ECOTRIN) 81 MG EC tablet, Take 81 mg by mouth once daily. , Disp: , Rfl:     cholecalciferol, vitamin D3, 1,000 unit capsule, Take 1,000 Units by mouth once daily. , Disp: , Rfl:     cilostazol (PLETAL) 100 MG Tab, Take 1 tablet (100 mg total) by mouth 2 (two) times daily., Disp: 180 tablet, Rfl: 3    docusate sodium (COLACE) 100 MG capsule, Take 100  mg by mouth once daily., Disp: , Rfl:     levothyroxine (SYNTHROID) 88 MCG tablet, Take 1 tablet (88 mcg total) by mouth once daily., Disp: 90 tablet, Rfl: 3    lovastatin (MEVACOR) 20 MG tablet, Take 1 tablet (20 mg total) by mouth every evening., Disp: 90 tablet, Rfl: 1    lovastatin (MEVACOR) 20 MG tablet, TAKE 1 TABLET(20 MG) BY MOUTH EVERY EVENING, Disp: 90 tablet, Rfl: 3    multivitamin-minerals-lutein (CENTRUM SILVER) Tab, Take 1 tablet by mouth once daily. , Disp: , Rfl:     olmesartan (BENICAR) 40 MG tablet, Take 1 tablet (40 mg total) by mouth once daily., Disp: 90 tablet, Rfl: 3    polyethylene glycol 3350 (MIRALAX ORAL), Take by mouth. Takes half dose daily, may increase to full dose if needed, Disp: , Rfl:     triamterene-hydrochlorothiazide 37.5-25 mg (DYAZIDE) 37.5-25 mg per capsule, Take 1 capsule by mouth every morning., Disp: 90 capsule, Rfl: 3    ferrous sulfate 325 (65 FE) MG EC tablet, Take 1 tablet (325 mg total) by mouth every other day., Disp: 90 tablet, Rfl: 1  Allergies  Review of patient's allergies indicates:  No Known Allergies  Review of Systems  Review of Systems   Constitutional: Positive for fatigue. Negative for activity change, appetite change, chills, fever and unexpected weight change.   HENT: Positive for hearing loss. Negative for congestion, facial swelling, nosebleeds, sinus pressure and trouble swallowing.    Eyes: Negative for pain, discharge and itching.   Respiratory: Negative for cough, chest tightness and shortness of breath.    Cardiovascular: Negative for chest pain, palpitations and leg swelling.   Gastrointestinal: Positive for constipation. Negative for abdominal distention, abdominal pain, blood in stool, diarrhea, nausea, rectal pain and vomiting.   Endocrine: Negative for heat intolerance and polydipsia.   Genitourinary: Negative for difficulty urinating, dysuria, flank pain, frequency, hematuria and urgency.   Musculoskeletal: Positive for back pain and  gait problem. Negative for arthralgias and neck pain.   Skin: Negative for color change, pallor and rash.   Neurological: Negative for dizziness, numbness and headaches.   Hematological: Negative for adenopathy. Does not bruise/bleed easily.   Psychiatric/Behavioral: Negative for confusion and decreased concentration. The patient is not nervous/anxious.         Objective     Objective:     Vitals:    03/20/19 0945   BP: 135/63   Pulse: 64   Resp: 20   Temp: 97.6 °F (36.4 °C)     Body surface area is 1.41 meters squared.  Body mass index is 21.15 kg/m².    GENERAL:  Well-developed, well-nourished elderly female in no acute distress. Vital signs reviewed.   SKIN:  Warm, dry without rashes, petechiae. Scattered senile purpura  EYES:  Pupils equal, round and reactive to light.  EOMs intact.  Conjunctivae normal.  HEAD:  Normocephalic.  EARS/NOSE/MOUTH/THROAT:  Hearing significantly decreased. Septum midline.  No excoriations or nasal discharge. No stomatitis or ulcers.  Lips normal. Oropharynx without lesions or exudates.  NECK:  Supple with good range of motion; no thyromegaly or masses  LYMPHATICS:  No cervical, supraclavicular, axillary adenopathy.  RESP:  Lungs clear to percussion and auscultation. Good airflow. Normal respiratory effort.   CARDIAC:  Regular rate and rhythm with systolic murmurs, rubs or gallops. Normal S1,S2. No edema  GI:  Soft, nontender, no hepatosplenomegaly, normal bowel sounds  MSK:  No clubbing or cyanosis, No joint swelling noted in hands  NEUROLOGICAL:  No focal neurological deficits except hearing loss.   PSYCHIATRIC:  Normal affect and mood.  Alert and Oriented x 3.       Labs and Imaging  Results for orders placed or performed in visit on 01/16/19   TSH   Result Value Ref Range    TSH 0.746 0.400 - 4.000 uIU/mL   Vitamin B12   Result Value Ref Range    Vitamin B-12 602 210 - 950 pg/mL   Folate   Result Value Ref Range    Folate >40.0 (H) 4.0 - 24.0 ng/mL   CBC auto differential   Result  Value Ref Range    WBC 5.61 3.90 - 12.70 K/uL    RBC 2.96 (L) 4.00 - 5.40 M/uL    Hemoglobin 9.2 (L) 12.0 - 16.0 g/dL    Hematocrit 30.5 (L) 37.0 - 48.5 %     (H) 82 - 98 fL    MCH 31.1 (H) 27.0 - 31.0 pg    MCHC 30.2 (L) 32.0 - 36.0 g/dL    RDW 14.3 11.5 - 14.5 %    Platelets 207 150 - 350 K/uL    MPV 9.4 9.2 - 12.9 fL    Gran # (ANC) 3.7 1.8 - 7.7 K/uL    Lymph # 1.3 1.0 - 4.8 K/uL    Mono # 0.6 0.3 - 1.0 K/uL    Eos # 0.1 0.0 - 0.5 K/uL    Baso # 0.03 0.00 - 0.20 K/uL    Gran% 65.6 38.0 - 73.0 %    Lymph% 22.3 18.0 - 48.0 %    Mono% 9.8 4.0 - 15.0 %    Eosinophil% 1.8 0.0 - 8.0 %    Basophil% 0.5 0.0 - 1.9 %    Differential Method Automated    Basic metabolic panel   Result Value Ref Range    Sodium 141 136 - 145 mmol/L    Potassium 4.6 3.5 - 5.1 mmol/L    Chloride 111 (H) 95 - 110 mmol/L    CO2 22 (L) 23 - 29 mmol/L    Glucose 101 70 - 110 mg/dL    BUN, Bld 35 (H) 10 - 30 mg/dL    Creatinine 1.3 0.5 - 1.4 mg/dL    Calcium 10.1 8.7 - 10.5 mg/dL    Anion Gap 8 8 - 16 mmol/L    eGFR if African American 41 (A) >60 mL/min/1.73 m^2    eGFR if non African American 35 (A) >60 mL/min/1.73 m^2   RPR   Result Value Ref Range    RPR Non-reactive Non-reactive     Lab Results   Component Value Date    WBC 5.61 01/16/2019    RBC 2.96 (L) 01/16/2019    HGB 9.2 (L) 01/16/2019    HCT 30.5 (L) 01/16/2019     (H) 01/16/2019    MCH 31.1 (H) 01/16/2019    MCHC 30.2 (L) 01/16/2019    RDW 14.3 01/16/2019     01/16/2019    MPV 9.4 01/16/2019    GRAN 3.7 01/16/2019    GRAN 65.6 01/16/2019    LYMPH 1.3 01/16/2019    LYMPH 22.3 01/16/2019    MONO 0.6 01/16/2019    MONO 9.8 01/16/2019    EOS 0.1 01/16/2019    BASO 0.03 01/16/2019    EOSINOPHIL 1.8 01/16/2019    BASOPHIL 0.5 01/16/2019     Pathology from melanoma reviewed.     Assessment   Assessment/Plan:     1. Multifactorial anemia   A. Anemia of stage III CKD   B. Mild iron deficiency anemia    * start oral iron every other day. Knows that she may need to increase  Miralax to full dose rather than half dose.     Her anemia is chronic but progressive over the past year from above etiologies. I do not think that she's severely symptomatic from her anemia. Its difficult to get her to and from clinics, thus we are opting to take a minimalistic approach at this point with oral iron. No procrit at this time.     2. Comorbidities including CKD III, aortic valve stenosis, hyperparathyroidism, HTN managed by PCP  3. 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 She has regular follow up with her PCP and in efforts to minimize appointments, I will be available if needed or progression of her anemia. I asked the patient to call for any questions, concerns, or new symptoms.         Distress Screening Results: Psychosocial Distress screening score of Distress Score: 0 noted and reviewed. No intervention indicated.     History obtained from nephews.   Medical decision making this visit:  Established problem, stable or improvin point each, 3 total points this category and New problem to me, no workup planned, 3 points  Lab test(s) reviewed/ordered: 1 point  Other diagnostic test reviewed in medicine section: 1 point  Outside records reviewed/requested and/or obtained history from someone other than patient: 1 point

## 2019-03-20 ENCOUNTER — INITIAL CONSULT (OUTPATIENT)
Dept: HEMATOLOGY/ONCOLOGY | Facility: CLINIC | Age: 84
End: 2019-03-20
Payer: MEDICARE

## 2019-03-20 VITALS
RESPIRATION RATE: 20 BRPM | HEART RATE: 64 BPM | TEMPERATURE: 98 F | HEIGHT: 59 IN | DIASTOLIC BLOOD PRESSURE: 63 MMHG | BODY MASS INDEX: 21.15 KG/M2 | SYSTOLIC BLOOD PRESSURE: 135 MMHG

## 2019-03-20 DIAGNOSIS — N18.30 ANEMIA OF CHRONIC RENAL FAILURE, STAGE 3 (MODERATE): ICD-10-CM

## 2019-03-20 DIAGNOSIS — D50.9 IRON DEFICIENCY ANEMIA, UNSPECIFIED IRON DEFICIENCY ANEMIA TYPE: Primary | ICD-10-CM

## 2019-03-20 DIAGNOSIS — E21.3 HYPERPARATHYROIDISM: ICD-10-CM

## 2019-03-20 DIAGNOSIS — D63.1 ANEMIA OF CHRONIC RENAL FAILURE, STAGE 3 (MODERATE): ICD-10-CM

## 2019-03-20 DIAGNOSIS — I10 ESSENTIAL HYPERTENSION: ICD-10-CM

## 2019-03-20 DIAGNOSIS — I35.0 NONRHEUMATIC AORTIC VALVE STENOSIS: ICD-10-CM

## 2019-03-20 DIAGNOSIS — N18.30 CKD (CHRONIC KIDNEY DISEASE), STAGE III: ICD-10-CM

## 2019-03-20 DIAGNOSIS — C43.9 MALIGNANT MELANOMA, UNSPECIFIED SITE: ICD-10-CM

## 2019-03-20 PROCEDURE — 99204 OFFICE O/P NEW MOD 45 MIN: CPT | Mod: S$PBB,,, | Performed by: INTERNAL MEDICINE

## 2019-03-20 PROCEDURE — 99999 PR PBB SHADOW E&M-EST. PATIENT-LVL III: ICD-10-PCS | Mod: PBBFAC,,, | Performed by: INTERNAL MEDICINE

## 2019-03-20 PROCEDURE — 99999 PR PBB SHADOW E&M-EST. PATIENT-LVL III: CPT | Mod: PBBFAC,,, | Performed by: INTERNAL MEDICINE

## 2019-03-20 PROCEDURE — 99213 OFFICE O/P EST LOW 20 MIN: CPT | Mod: PBBFAC | Performed by: INTERNAL MEDICINE

## 2019-03-20 PROCEDURE — 99204 PR OFFICE/OUTPT VISIT, NEW, LEVL IV, 45-59 MIN: ICD-10-PCS | Mod: S$PBB,,, | Performed by: INTERNAL MEDICINE

## 2019-03-20 RX ORDER — FERROUS SULFATE 325(65) MG
325 TABLET, DELAYED RELEASE (ENTERIC COATED) ORAL EVERY OTHER DAY
Qty: 90 TABLET | Refills: 1 | Status: SHIPPED | OUTPATIENT
Start: 2019-03-20

## 2019-03-20 NOTE — LETTER
March 20, 2019      Dolly Grijalva MD  [address]  [facility] [address]           [facility] Hematology Oncology  [address]  [facility] [address]  Phone: [phone]          Patient: Jayda Rendon   MR Number: [id number]   YOB: 1924   Date of Visit: 3/20/2019       Dear Dr. Dolly Grijalva:    Thank you for referring Jayda Rendon to me for evaluation. Attached you will find relevant portions of my assessment and plan of care.    If you have questions, please do not hesitate to call me. I look forward to following Jayda Rendon along with you.    Sincerely,    Janelle Polanco MD    Enclosure  CC:  No Recipients    If you would like to receive this communication electronically, please contact [email or web address] or [phone] to request more information on [employer] Link access.    For providers and/or their staff who would like to refer a patient to [facility], please contact us through our one-stop-shop provider referral line, [facility] , at [phone].    If you feel you have received this communication in error or would no longer like to receive these types of communications, please e-mail [email or web address]

## 2019-05-07 RX ORDER — ALENDRONATE SODIUM 70 MG/1
TABLET ORAL
Qty: 12 TABLET | Refills: 3 | Status: ON HOLD | OUTPATIENT
Start: 2019-05-07 | End: 2019-09-23 | Stop reason: HOSPADM

## 2019-05-10 ENCOUNTER — OFFICE VISIT (OUTPATIENT)
Dept: INTERNAL MEDICINE | Facility: CLINIC | Age: 84
End: 2019-05-10
Payer: MEDICARE

## 2019-05-10 ENCOUNTER — HOSPITAL ENCOUNTER (OUTPATIENT)
Dept: RADIOLOGY | Facility: HOSPITAL | Age: 84
Discharge: HOME | End: 2019-05-10
Attending: INTERNAL MEDICINE
Payer: MEDICARE

## 2019-05-10 VITALS — HEART RATE: 66 BPM | DIASTOLIC BLOOD PRESSURE: 68 MMHG | SYSTOLIC BLOOD PRESSURE: 122 MMHG | OXYGEN SATURATION: 99 %

## 2019-05-10 DIAGNOSIS — R07.9 CHEST PAIN, UNSPECIFIED TYPE: ICD-10-CM

## 2019-05-10 DIAGNOSIS — M25.552 ACUTE HIP PAIN, LEFT: ICD-10-CM

## 2019-05-10 DIAGNOSIS — R07.9 CHEST PAIN, UNSPECIFIED TYPE: Primary | ICD-10-CM

## 2019-05-10 PROCEDURE — 99213 OFFICE O/P EST LOW 20 MIN: CPT | Mod: PBBFAC,25 | Performed by: INTERNAL MEDICINE

## 2019-05-10 PROCEDURE — 99214 OFFICE O/P EST MOD 30 MIN: CPT | Mod: S$PBB,,, | Performed by: INTERNAL MEDICINE

## 2019-05-10 PROCEDURE — 93005 ELECTROCARDIOGRAM TRACING: CPT | Mod: PBBFAC | Performed by: INTERNAL MEDICINE

## 2019-05-10 PROCEDURE — 73502 X-RAY EXAM HIP UNI 2-3 VIEWS: CPT | Mod: TC,LT

## 2019-05-10 PROCEDURE — 99999 PR PBB SHADOW E&M-EST. PATIENT-LVL III: CPT | Mod: PBBFAC,,, | Performed by: INTERNAL MEDICINE

## 2019-05-10 PROCEDURE — 71046 XR CHEST PA AND LATERAL: ICD-10-PCS | Mod: 26,,, | Performed by: RADIOLOGY

## 2019-05-10 PROCEDURE — 73502 X-RAY EXAM HIP UNI 2-3 VIEWS: CPT | Mod: 26,LT,, | Performed by: RADIOLOGY

## 2019-05-10 PROCEDURE — 71046 X-RAY EXAM CHEST 2 VIEWS: CPT | Mod: 26,,, | Performed by: RADIOLOGY

## 2019-05-10 PROCEDURE — 93010 ELECTROCARDIOGRAM REPORT: CPT | Mod: S$PBB,,, | Performed by: INTERNAL MEDICINE

## 2019-05-10 PROCEDURE — 99999 PR PBB SHADOW E&M-EST. PATIENT-LVL III: ICD-10-PCS | Mod: PBBFAC,,, | Performed by: INTERNAL MEDICINE

## 2019-05-10 PROCEDURE — 71046 X-RAY EXAM CHEST 2 VIEWS: CPT | Mod: TC

## 2019-05-10 PROCEDURE — 99214 PR OFFICE/OUTPT VISIT, EST, LEVL IV, 30-39 MIN: ICD-10-PCS | Mod: S$PBB,,, | Performed by: INTERNAL MEDICINE

## 2019-05-10 PROCEDURE — 93010 EKG 12-LEAD: ICD-10-PCS | Mod: S$PBB,,, | Performed by: INTERNAL MEDICINE

## 2019-05-10 PROCEDURE — 73502 XR HIP 2 VIEW LEFT: ICD-10-PCS | Mod: 26,LT,, | Performed by: RADIOLOGY

## 2019-05-10 NOTE — PROGRESS NOTES
"Subjective:       Patient ID: Jayda Rendon is a 89 y.o. female.    Chief Complaint: Fall (fell on left side)    89 year old lady slipped off her stool 3 days ago landing on her left hip and may have injured left chest.  Is able to walk with help which is standard for her.  She is very hard of hearing.  Has "valve" problem with heart.  Sons are concerned because she has been complaining of chest pain and pain in left arm.  No one is sure if these 2 pains are related    Review of Systems   Constitutional: Negative for activity change, chills, fatigue and fever.   HENT: Negative for congestion, ear pain, nosebleeds, postnasal drip, sinus pressure and sore throat.    Eyes: Negative.  Negative for visual disturbance.   Respiratory: Negative for cough, chest tightness, shortness of breath and wheezing.    Cardiovascular: Negative for chest pain.   Gastrointestinal: Negative for abdominal pain, diarrhea, nausea and vomiting.   Genitourinary: Negative for difficulty urinating, dysuria, frequency and urgency.   Musculoskeletal: Negative for arthralgias and neck stiffness.   Skin: Negative for rash.   Neurological: Negative for dizziness, weakness and headaches.   Psychiatric/Behavioral: Negative for sleep disturbance. The patient is not nervous/anxious.        Objective:      Physical Exam   Constitutional: She is oriented to person, place, and time. She appears well-developed and well-nourished.  Non-toxic appearance. No distress.   HENT:   Head: Normocephalic and atraumatic.   Right Ear: Tympanic membrane, external ear and ear canal normal.   Left Ear: Tympanic membrane, external ear and ear canal normal.   Eyes: Pupils are equal, round, and reactive to light. EOM are normal. No scleral icterus.   Neck: Normal range of motion. Neck supple. No thyromegaly present.   Cardiovascular: Normal rate, regular rhythm and normal heart sounds.   Pulmonary/Chest: Effort normal and breath sounds normal.       Abdominal: Soft. " Bowel sounds are normal. She exhibits no mass. There is no tenderness. There is no rebound.   Musculoskeletal: Normal range of motion.        Legs:  Lymphadenopathy:     She has no cervical adenopathy.   Neurological: She is alert and oriented to person, place, and time. She has normal reflexes. She displays normal reflexes. No cranial nerve deficit. She exhibits normal muscle tone. Coordination normal.   Skin: Skin is warm and dry.   Psychiatric: She has a normal mood and affect. Her behavior is normal.       Assessment:       1. Chest pain, unspecified type    2. Acute hip pain, left        Plan:   Jayda was seen today for fall.    Diagnoses and all orders for this visit:    Chest pain, unspecified type  -     EKG 12-lead  -     X-Ray Chest PA And Lateral; Future    Acute hip pain, left  -     X-Ray Hip 2 or 3 views Left; Future

## 2019-05-22 RX ORDER — ALENDRONATE SODIUM 70 MG/1
TABLET ORAL
Qty: 16 TABLET | Refills: 0 | OUTPATIENT
Start: 2019-05-22

## 2019-06-19 ENCOUNTER — TELEPHONE (OUTPATIENT)
Dept: INTERNAL MEDICINE | Facility: CLINIC | Age: 84
End: 2019-06-19

## 2019-06-19 DIAGNOSIS — I35.0 NONRHEUMATIC AORTIC VALVE STENOSIS: Primary | ICD-10-CM

## 2019-06-19 DIAGNOSIS — M25.569 KNEE PAIN, UNSPECIFIED CHRONICITY, UNSPECIFIED LATERALITY: ICD-10-CM

## 2019-06-19 NOTE — TELEPHONE ENCOUNTER
----- Message from Renée Mendez MA sent at 6/19/2019  3:03 PM CDT -----  Contact: Rubi Raimundo [phone]  Patient is complaining of knee pain and would like to see orthopedic.  Patient also complaining of left arm pain, shoulder pain that comes and goes, She is due to see Cardiology , had EKG recently after fall---

## 2019-06-20 NOTE — TELEPHONE ENCOUNTER
Referral in for ortho and cardiology (she last saw Dr. Sanderson). Please see if she's having any chest pain w/ these L arm and shoulder pain - if so, go to ED.

## 2019-06-21 NOTE — TELEPHONE ENCOUNTER
Spoke with the pts nephew Raimundo and informed him of the message from Dr. Grijalva.Carmen Beauchamp

## 2019-07-02 ENCOUNTER — HOSPITAL ENCOUNTER (OUTPATIENT)
Dept: RADIOLOGY | Facility: HOSPITAL | Age: 84
Discharge: HOME | End: 2019-07-02
Attending: PHYSICIAN ASSISTANT
Payer: MEDICARE

## 2019-07-02 ENCOUNTER — OFFICE VISIT (OUTPATIENT)
Dept: ORTHOPEDICS | Facility: CLINIC | Age: 84
End: 2019-07-02
Payer: MEDICARE

## 2019-07-02 VITALS
HEART RATE: 63 BPM | HEIGHT: 59 IN | WEIGHT: 104 LBS | DIASTOLIC BLOOD PRESSURE: 57 MMHG | BODY MASS INDEX: 20.96 KG/M2 | SYSTOLIC BLOOD PRESSURE: 95 MMHG

## 2019-07-02 DIAGNOSIS — M19.012 OSTEOARTHRITIS OF LEFT SHOULDER, UNSPECIFIED OSTEOARTHRITIS TYPE: ICD-10-CM

## 2019-07-02 DIAGNOSIS — R52 PAIN: ICD-10-CM

## 2019-07-02 DIAGNOSIS — M17.12 PRIMARY OSTEOARTHRITIS OF LEFT KNEE: Primary | ICD-10-CM

## 2019-07-02 PROCEDURE — 99999 PR PBB SHADOW E&M-EST. PATIENT-LVL IV: ICD-10-PCS | Mod: PBBFAC,,, | Performed by: PHYSICIAN ASSISTANT

## 2019-07-02 PROCEDURE — 99999 PR PBB SHADOW E&M-EST. PATIENT-LVL IV: CPT | Mod: PBBFAC,,, | Performed by: PHYSICIAN ASSISTANT

## 2019-07-02 PROCEDURE — 73560 X-RAY EXAM OF KNEE 1 OR 2: CPT | Mod: TC,LT

## 2019-07-02 PROCEDURE — 20610 PR DRAIN/INJECT LARGE JOINT/BURSA: ICD-10-PCS | Mod: S$PBB,LT,, | Performed by: PHYSICIAN ASSISTANT

## 2019-07-02 PROCEDURE — 73560 X-RAY EXAM OF KNEE 1 OR 2: CPT | Mod: 26,59,LT, | Performed by: RADIOLOGY

## 2019-07-02 PROCEDURE — 20610 DRAIN/INJ JOINT/BURSA W/O US: CPT | Mod: PBBFAC | Performed by: PHYSICIAN ASSISTANT

## 2019-07-02 PROCEDURE — 73030 X-RAY EXAM OF SHOULDER: CPT | Mod: 26,LT,, | Performed by: RADIOLOGY

## 2019-07-02 PROCEDURE — 73030 XR SHOULDER COMPLETE 2 OR MORE VIEWS RIGHT: ICD-10-PCS | Mod: 26,76,RT, | Performed by: RADIOLOGY

## 2019-07-02 PROCEDURE — 20610 DRAIN/INJ JOINT/BURSA W/O US: CPT | Mod: S$PBB,LT,, | Performed by: PHYSICIAN ASSISTANT

## 2019-07-02 PROCEDURE — 73562 X-RAY EXAM OF KNEE 3: CPT | Mod: TC,LT

## 2019-07-02 PROCEDURE — 73030 X-RAY EXAM OF SHOULDER: CPT | Mod: 26,76,RT, | Performed by: RADIOLOGY

## 2019-07-02 PROCEDURE — 73030 X-RAY EXAM OF SHOULDER: CPT | Mod: TC,RT

## 2019-07-02 PROCEDURE — 73562 X-RAY EXAM OF KNEE 3: CPT | Mod: 26,LT,, | Performed by: RADIOLOGY

## 2019-07-02 PROCEDURE — 73562 XR KNEE ORTHO LEFT: ICD-10-PCS | Mod: 26,LT,, | Performed by: RADIOLOGY

## 2019-07-02 PROCEDURE — 99214 OFFICE O/P EST MOD 30 MIN: CPT | Mod: 25,S$PBB,, | Performed by: PHYSICIAN ASSISTANT

## 2019-07-02 PROCEDURE — 99214 OFFICE O/P EST MOD 30 MIN: CPT | Mod: PBBFAC,25 | Performed by: PHYSICIAN ASSISTANT

## 2019-07-02 PROCEDURE — 73030 X-RAY EXAM OF SHOULDER: CPT | Mod: TC,LT

## 2019-07-02 PROCEDURE — 73560 XR KNEE ORTHO LEFT: ICD-10-PCS | Mod: 26,59,LT, | Performed by: RADIOLOGY

## 2019-07-02 PROCEDURE — 73030 XR SHOULDER COMPLETE 2 OR MORE VIEWS LEFT: ICD-10-PCS | Mod: 26,LT,, | Performed by: RADIOLOGY

## 2019-07-02 PROCEDURE — 99214 PR OFFICE/OUTPT VISIT, EST, LEVL IV, 30-39 MIN: ICD-10-PCS | Mod: 25,S$PBB,, | Performed by: PHYSICIAN ASSISTANT

## 2019-07-02 RX ORDER — METHYLPREDNISOLONE ACETATE 80 MG/ML
80 INJECTION, SUSPENSION INTRA-ARTICULAR; INTRALESIONAL; INTRAMUSCULAR; SOFT TISSUE
Status: COMPLETED | OUTPATIENT
Start: 2019-07-02 | End: 2019-07-02

## 2019-07-02 RX ADMIN — METHYLPREDNISOLONE ACETATE 80 MG: 80 INJECTION, SUSPENSION INTRALESIONAL; INTRAMUSCULAR; INTRASYNOVIAL; SOFT TISSUE at 03:07

## 2019-07-02 NOTE — LETTER
July 2, 2019      Dolly Grijalva MD  [address]  [facility] [address]           [facility]  [address]  [facility] [address]  Phone: [phone]          Patient: Jayda Rendon   MR Number: [id number]   YOB: 1924   Date of Visit: 7/2/2019       Dear Dr. Dolly Grijalva:    Thank you for referring Jayda Rendon to me for evaluation. Attached you will find relevant portions of my assessment and plan of care.    If you have questions, please do not hesitate to call me. I look forward to following Jayda Rendon along with you.    Sincerely,    Zully Joaquin PA-C    Enclosure  CC:  No Recipients    If you would like to receive this communication electronically, please contact [email or web address] or [phone] to request more information on [employer] Link access.    For providers and/or their staff who would like to refer a patient to [facility], please contact us through our one-stop-shop provider referral line, [facility], at [phone].    If you feel you have received this communication in error or would no longer like to receive these types of communications, please e-mail [email or web address]

## 2019-07-02 NOTE — PROGRESS NOTES
"Subjective:      Patient ID: Jayda Rendon is a 89 y.o. female.    Chief Complaint: Pain of the Left Knee and Pain of the Left Shoulder    HPI  89 year old female presents with chief complaint of intermittent left shoulder pain x several months. She is RHD. She has h/o dementia so most of her history is provided by her nephews. They do not recall h/o shoulder trauma but says "she lived a hard life." She really only complains of pain in the mornings. She takes tylenol occasionally. No injection has been done.   Patient reports left knee pain. She has h/o OA. She was wearing a knee brace on and off for years but she hasn't been wearing it for the past 4-5 weeks. She has pain with walking but she doesn't do much walking. She denies swelling. She ambulates with a walker.   Review of Systems   Constitution: Negative for chills, fever and night sweats.   Cardiovascular: Negative for chest pain.   Respiratory: Negative for cough and shortness of breath.    Hematologic/Lymphatic: Does not bruise/bleed easily.   Skin: Negative for color change.   Gastrointestinal: Negative for heartburn.   Genitourinary: Negative for dysuria.   Neurological: Negative for numbness and paresthesias.   Psychiatric/Behavioral: Negative for altered mental status.   Allergic/Immunologic: Negative for persistent infections.         Objective:            General    Vitals reviewed.  Constitutional: She is oriented to person, place, and time. She appears well-developed and well-nourished.   Cardiovascular: Normal rate.    Neurological: She is alert and oriented to person, place, and time.             Left Shoulder Exam     Range of Motion   Forward Flexion: 150   External Rotation 0 degrees: abnormal   Internal rotation 0 degrees: abnormal     Tests & Signs   Weathers test: positive  Impingement: negative  Speed's Test: negative    Other   Sensation: normal       Vascular Exam       Left Pulses      Radial:                    2+        Left Knee " Exam:  ROM 0-90 degrees  No effusion  No warmth or erythema  TTP medial joint line      X-ray knee: ordered and reviewed by myself. Joint spaces are narrowed laterally with bony sclerosis and eburnation bilaterally.  Vascular calcifications are.  Patellas are intact and no definite joint effusion is seen.    X-ray shoulder: ordered and reviewed by myself. Severe degenerative changes seen in the glenohumeral joint space with sclerosis of both the glenoid fossa and deformity and sclerosis of the humeral head.  Some degenerative changes seen at the acromioclavicular joint and there is soft tissue calcification adjacent to the greater tuberosity of the humerus.      Assessment:       Encounter Diagnoses   Name Primary?    Primary osteoarthritis of left knee Yes    Osteoarthritis of left shoulder, unspecified osteoarthritis type           Plan:       Discussed treatment options with patient and family. Will proceed with cortisone injection for knee. She will take tylenol arthritis for her shoulder. Discussed her shoulder with Dr. Barnes who says patient can receive cortisone injection in her shoulder in the future if needed due to her pmhx (not ideal surgical candidate). RTC prn.     PROCEDURE:  I have explained the risks, benefits, and alternatives of the procedure in detail.  The patient voices understanding and all questions have been answered.  The patient agrees to proceed as planned. So after I performed a sterile prep of the skin in the normal fashion the left knee is injected using a 22 gauge needle from the anteromedial approach with a combination of 4cc 1% plain lidocaine and 80 mg of depo medrol.  The patient is cautioned and immediate relief of pain is secondary to the local anesthetic and will be temporary.  After the anesthetic wears off there may be a increase in pain that may last for a few hours or a few days and they should use ice to help alleviate this flair up of pain.

## 2019-07-15 ENCOUNTER — LAB VISIT (OUTPATIENT)
Dept: LAB | Facility: HOSPITAL | Age: 84
End: 2019-07-15
Attending: INTERNAL MEDICINE
Payer: MEDICARE

## 2019-07-15 DIAGNOSIS — I10 ESSENTIAL HYPERTENSION: ICD-10-CM

## 2019-07-15 DIAGNOSIS — E78.2 MIXED HYPERLIPIDEMIA: ICD-10-CM

## 2019-07-15 DIAGNOSIS — E03.8 OTHER SPECIFIED HYPOTHYROIDISM: ICD-10-CM

## 2019-07-15 DIAGNOSIS — I35.0 NONRHEUMATIC AORTIC VALVE STENOSIS: ICD-10-CM

## 2019-07-15 LAB
ALBUMIN SERPL BCP-MCNC: 3.8 G/DL (ref 3.5–5.2)
ALP SERPL-CCNC: 62 U/L (ref 55–135)
ALT SERPL W/O P-5'-P-CCNC: 9 U/L (ref 10–44)
ANION GAP SERPL CALC-SCNC: 8 MMOL/L (ref 8–16)
AST SERPL-CCNC: 12 U/L (ref 10–40)
BILIRUB SERPL-MCNC: 0.4 MG/DL (ref 0.1–1)
BUN SERPL-MCNC: 41 MG/DL (ref 10–30)
CALCIUM SERPL-MCNC: 9.9 MG/DL (ref 8.7–10.5)
CHLORIDE SERPL-SCNC: 111 MMOL/L (ref 95–110)
CHOLEST SERPL-MCNC: 168 MG/DL (ref 120–199)
CHOLEST/HDLC SERPL: 2.1 {RATIO} (ref 2–5)
CO2 SERPL-SCNC: 22 MMOL/L (ref 23–29)
CREAT SERPL-MCNC: 1.5 MG/DL (ref 0.5–1.4)
EST. GFR  (AFRICAN AMERICAN): 34 ML/MIN/1.73 M^2
EST. GFR  (NON AFRICAN AMERICAN): 29 ML/MIN/1.73 M^2
GLUCOSE SERPL-MCNC: 93 MG/DL (ref 70–110)
HDLC SERPL-MCNC: 80 MG/DL (ref 40–75)
HDLC SERPL: 47.6 % (ref 20–50)
LDLC SERPL CALC-MCNC: 74.8 MG/DL (ref 63–159)
NONHDLC SERPL-MCNC: 88 MG/DL
POTASSIUM SERPL-SCNC: 4.9 MMOL/L (ref 3.5–5.1)
PROT SERPL-MCNC: 6.8 G/DL (ref 6–8.4)
SODIUM SERPL-SCNC: 141 MMOL/L (ref 136–145)
T4 FREE SERPL-MCNC: 1.15 NG/DL (ref 0.71–1.51)
TRIGL SERPL-MCNC: 66 MG/DL (ref 30–150)

## 2019-07-15 PROCEDURE — 80053 COMPREHEN METABOLIC PANEL: CPT

## 2019-07-15 PROCEDURE — 80061 LIPID PANEL: CPT

## 2019-07-15 PROCEDURE — 84439 ASSAY OF FREE THYROXINE: CPT

## 2019-07-15 PROCEDURE — 36415 COLL VENOUS BLD VENIPUNCTURE: CPT

## 2019-07-20 NOTE — PROGRESS NOTES
Subjective:   Patient ID:  Jayda Rendon is a 89 y.o. female who presents for follow-up of CVD    HPI: The patient is here for valvular heart disease/CAD risk.   The patient has no chest pain, SOB, TIA, palpitations, syncope or pre-syncope.Patient does not exercise.Dementia now-swelling right leg more when sitting.        Review of Systems   Constitution: Negative for chills, decreased appetite, diaphoresis, fever, malaise/fatigue, night sweats, weight gain and weight loss.   HENT: Negative for congestion, hoarse voice, nosebleeds, sore throat and tinnitus.    Eyes: Negative for blurred vision, double vision, vision loss in left eye, vision loss in right eye, visual disturbance and visual halos.   Cardiovascular: Positive for leg swelling. Negative for chest pain, claudication, cyanosis, dyspnea on exertion, irregular heartbeat, near-syncope, orthopnea, palpitations, paroxysmal nocturnal dyspnea and syncope.   Respiratory: Negative for cough, hemoptysis, shortness of breath, sleep disturbances due to breathing, snoring, sputum production and wheezing.    Endocrine: Negative for cold intolerance, heat intolerance, polydipsia, polyphagia and polyuria.   Hematologic/Lymphatic: Negative for adenopathy and bleeding problem. Does not bruise/bleed easily.   Skin: Negative for color change, dry skin, flushing, itching, nail changes, poor wound healing, rash, skin cancer, suspicious lesions and unusual hair distribution.   Musculoskeletal: Positive for arthritis, joint pain and muscle weakness. Negative for back pain, falls, gout, joint swelling, muscle cramps, myalgias and stiffness.   Gastrointestinal: Negative for abdominal pain, anorexia, change in bowel habit, constipation, diarrhea, dysphagia, heartburn, hematemesis, hematochezia, melena and vomiting.   Genitourinary: Negative for decreased libido, dysuria, hematuria, hesitancy and urgency.   Neurological: Negative for excessive daytime sleepiness, dizziness, focal  "weakness, headaches, light-headedness, loss of balance, numbness, paresthesias, seizures, sensory change, tremors, vertigo and weakness.   Psychiatric/Behavioral: Negative for altered mental status, depression, hallucinations, memory loss, substance abuse and suicidal ideas. The patient does not have insomnia and is not nervous/anxious.    Allergic/Immunologic: Negative for environmental allergies and hives.       Objective: BP (!) 104/58 (BP Location: Left arm, Patient Position: Sitting, BP Method: Medium (Manual))   Pulse (!) 56   Ht 4' 11" (1.499 m)   Wt 49.4 kg (108 lb 14.5 oz)   BMI 22.00 kg/m²      Physical Exam   Constitutional: She is oriented to person, place, and time. She appears well-developed and well-nourished.   HENT:   Head: Normocephalic.   Eyes: Pupils are equal, round, and reactive to light. EOM are normal.   Neck: Normal range of motion. Normal carotid pulses, no hepatojugular reflux and no JVD present. Carotid bruit is not present. No thyromegaly present.   Cardiovascular: Normal rate, regular rhythm and intact distal pulses. Exam reveals no gallop and no friction rub.   Murmur heard.   Systolic murmur is present with a grade of 2/6.  Pulses:       Carotid pulses are 2+ on the right side, and 2+ on the left side.       Femoral pulses are 2+ on the right side, and 2+ on the left side.       Popliteal pulses are 1+ on the right side, and 1+ on the left side.        Dorsalis pedis pulses are 1+ on the right side, and 1+ on the left side.        Posterior tibial pulses are 1+ on the right side, and 1+ on the left side.   Pulmonary/Chest: Effort normal and breath sounds normal. No tachypnea. No respiratory distress. She has no wheezes. She has no rales. She exhibits no tenderness.   Abdominal: Soft. Bowel sounds are normal. She exhibits no distension and no mass. There is no tenderness. There is no rebound and no guarding.   Musculoskeletal: Normal range of motion. She exhibits edema. She " exhibits no tenderness.   Lymphadenopathy:     She has no cervical adenopathy.   Neurological: She is alert and oriented to person, place, and time. No cranial nerve deficit. Coordination normal.   Skin: Skin is warm. No rash noted. No erythema.   Psychiatric: She has a normal mood and affect. Her behavior is normal. Judgment and thought content normal.       Assessment:     1. Nonrheumatic aortic valve stenosis    2. PAD (peripheral artery disease)    3. Non-rheumatic mitral regurgitation    4. Essential hypertension    5. Mixed hyperlipidemia    6. Arthritis        Plan:   Discussed diet , achieving and maintaining ideal body weight, and exercise.   We reviewed meds in detail.  Reassured-Discussed goals, options , plan.  Reduce the HCTZ to just 5 times per week and elevate leg      Jayda was seen today for peripheral arterial disease.    Diagnoses and all orders for this visit:    Nonrheumatic aortic valve stenosis  -     Brain natriuretic peptide; Future; Expected date: 03/22/2020  -     Basic metabolic panel; Future; Expected date: 03/22/2020    PAD (peripheral artery disease)    Non-rheumatic mitral regurgitation  -     Brain natriuretic peptide; Future; Expected date: 03/22/2020  -     Basic metabolic panel; Future; Expected date: 03/22/2020    Essential hypertension  -     Brain natriuretic peptide; Future; Expected date: 03/22/2020  -     Basic metabolic panel; Future; Expected date: 03/22/2020    Mixed hyperlipidemia    Arthritis            Follow up in about 8 months (around 3/22/2020) for with chem 7 and BNP.

## 2019-07-22 ENCOUNTER — OFFICE VISIT (OUTPATIENT)
Dept: CARDIOLOGY | Facility: CLINIC | Age: 84
End: 2019-07-22
Payer: MEDICARE

## 2019-07-22 VITALS
DIASTOLIC BLOOD PRESSURE: 58 MMHG | HEART RATE: 56 BPM | BODY MASS INDEX: 21.96 KG/M2 | SYSTOLIC BLOOD PRESSURE: 104 MMHG | WEIGHT: 108.94 LBS | HEIGHT: 59 IN

## 2019-07-22 DIAGNOSIS — E78.2 MIXED HYPERLIPIDEMIA: ICD-10-CM

## 2019-07-22 DIAGNOSIS — I73.9 PAD (PERIPHERAL ARTERY DISEASE): ICD-10-CM

## 2019-07-22 DIAGNOSIS — I10 ESSENTIAL HYPERTENSION: ICD-10-CM

## 2019-07-22 DIAGNOSIS — I35.0 NONRHEUMATIC AORTIC VALVE STENOSIS: Primary | ICD-10-CM

## 2019-07-22 DIAGNOSIS — M19.90 ARTHRITIS: ICD-10-CM

## 2019-07-22 DIAGNOSIS — I34.0 NON-RHEUMATIC MITRAL REGURGITATION: ICD-10-CM

## 2019-07-22 PROCEDURE — 99999 PR PBB SHADOW E&M-EST. PATIENT-LVL V: CPT | Mod: PBBFAC,,, | Performed by: INTERNAL MEDICINE

## 2019-07-22 PROCEDURE — 99215 OFFICE O/P EST HI 40 MIN: CPT | Mod: PBBFAC | Performed by: INTERNAL MEDICINE

## 2019-07-22 PROCEDURE — 99214 OFFICE O/P EST MOD 30 MIN: CPT | Mod: S$PBB,,, | Performed by: INTERNAL MEDICINE

## 2019-07-22 PROCEDURE — 99214 PR OFFICE/OUTPT VISIT, EST, LEVL IV, 30-39 MIN: ICD-10-PCS | Mod: S$PBB,,, | Performed by: INTERNAL MEDICINE

## 2019-07-22 PROCEDURE — 99999 PR PBB SHADOW E&M-EST. PATIENT-LVL V: ICD-10-PCS | Mod: PBBFAC,,, | Performed by: INTERNAL MEDICINE

## 2019-07-22 NOTE — PATIENT INSTRUCTIONS
Discussed diet , achieving and maintaining ideal body weight, and exercise.   We reviewed meds in detail.  Reassured-Discussed goals, options , plan.  Reduce the HCTZ to just 5 times per week and elevate leg

## 2019-07-22 NOTE — LETTER
July 22, 2019      Dolly Grijalva MD  [address]  [place] [address]           [facility] - Cardiology  [address]  [place] [address]  Phone: [phone]          Patient: Jayda Rendon   MR Number: [id number]   YOB: 1924   Date of Visit: 7/22/2019       Dear Dr. Dolly Grijalva:    Thank you for referring Jayda Rendon to me for evaluation. Attached you will find relevant portions of my assessment and plan of care.    If you have questions, please do not hesitate to call me. I look forward to following Jayda Rendon along with you.    Sincerely,    Bandar Sanderson MD    Enclosure  CC:  No Recipients    If you would like to receive this communication electronically, please contact [email or web address] or [phone] to request more information on [employer] Link access.    For providers and/or their staff who would like to refer a patient to [facility], please contact us through our one-stop-shop provider referral line, [facility] , at [phone].    If you feel you have received this communication in error or would no longer like to receive these types of communications, please e-mail [email or web address]

## 2019-07-29 ENCOUNTER — OFFICE VISIT (OUTPATIENT)
Dept: INTERNAL MEDICINE | Facility: CLINIC | Age: 84
End: 2019-07-29
Payer: MEDICARE

## 2019-07-29 ENCOUNTER — TELEPHONE (OUTPATIENT)
Dept: INTERNAL MEDICINE | Facility: CLINIC | Age: 84
End: 2019-07-29

## 2019-07-29 ENCOUNTER — LAB VISIT (OUTPATIENT)
Dept: LAB | Facility: HOSPITAL | Age: 84
End: 2019-07-29
Attending: INTERNAL MEDICINE
Payer: MEDICARE

## 2019-07-29 VITALS
DIASTOLIC BLOOD PRESSURE: 60 MMHG | BODY MASS INDEX: 22 KG/M2 | TEMPERATURE: 99 F | HEART RATE: 63 BPM | OXYGEN SATURATION: 97 % | HEIGHT: 59 IN | SYSTOLIC BLOOD PRESSURE: 94 MMHG

## 2019-07-29 DIAGNOSIS — F02.80 LATE ONSET ALZHEIMER'S DISEASE WITHOUT BEHAVIORAL DISTURBANCE: ICD-10-CM

## 2019-07-29 DIAGNOSIS — N18.30 CKD (CHRONIC KIDNEY DISEASE) STAGE 3, GFR 30-59 ML/MIN: ICD-10-CM

## 2019-07-29 DIAGNOSIS — R44.0 AUDITORY HALLUCINATION: ICD-10-CM

## 2019-07-29 DIAGNOSIS — G30.1 LATE ONSET ALZHEIMER'S DISEASE WITHOUT BEHAVIORAL DISTURBANCE: ICD-10-CM

## 2019-07-29 DIAGNOSIS — E03.9 HYPOTHYROIDISM, UNSPECIFIED TYPE: ICD-10-CM

## 2019-07-29 DIAGNOSIS — I10 BENIGN ESSENTIAL HYPERTENSION: Primary | ICD-10-CM

## 2019-07-29 DIAGNOSIS — I35.0 AORTIC VALVE STENOSIS, ETIOLOGY OF CARDIAC VALVE DISEASE UNSPECIFIED: ICD-10-CM

## 2019-07-29 DIAGNOSIS — R07.89 ATYPICAL CHEST PAIN: ICD-10-CM

## 2019-07-29 LAB
ANION GAP SERPL CALC-SCNC: 8 MMOL/L (ref 8–16)
BASOPHILS # BLD AUTO: 0.02 K/UL (ref 0–0.2)
BASOPHILS NFR BLD: 0.4 % (ref 0–1.9)
BUN SERPL-MCNC: 46 MG/DL (ref 10–30)
CALCIUM SERPL-MCNC: 10.2 MG/DL (ref 8.7–10.5)
CHLORIDE SERPL-SCNC: 111 MMOL/L (ref 95–110)
CO2 SERPL-SCNC: 22 MMOL/L (ref 23–29)
CREAT SERPL-MCNC: 1.6 MG/DL (ref 0.5–1.4)
DIFFERENTIAL METHOD: ABNORMAL
EOSINOPHIL # BLD AUTO: 0.1 K/UL (ref 0–0.5)
EOSINOPHIL NFR BLD: 2.5 % (ref 0–8)
ERYTHROCYTE [DISTWIDTH] IN BLOOD BY AUTOMATED COUNT: 15.5 % (ref 11.5–14.5)
EST. GFR  (AFRICAN AMERICAN): 31 ML/MIN/1.73 M^2
EST. GFR  (NON AFRICAN AMERICAN): 27 ML/MIN/1.73 M^2
GLUCOSE SERPL-MCNC: 96 MG/DL (ref 70–110)
HCT VFR BLD AUTO: 31 % (ref 37–48.5)
HGB BLD-MCNC: 9.6 G/DL (ref 12–16)
LYMPHOCYTES # BLD AUTO: 0.9 K/UL (ref 1–4.8)
LYMPHOCYTES NFR BLD: 21 % (ref 18–48)
MCH RBC QN AUTO: 31.8 PG (ref 27–31)
MCHC RBC AUTO-ENTMCNC: 31 G/DL (ref 32–36)
MCV RBC AUTO: 103 FL (ref 82–98)
MONOCYTES # BLD AUTO: 0.4 K/UL (ref 0.3–1)
MONOCYTES NFR BLD: 8.5 % (ref 4–15)
NEUTROPHILS # BLD AUTO: 3 K/UL (ref 1.8–7.7)
NEUTROPHILS NFR BLD: 67.6 % (ref 38–73)
PLATELET # BLD AUTO: 202 K/UL (ref 150–350)
PMV BLD AUTO: 9.4 FL (ref 9.2–12.9)
POTASSIUM SERPL-SCNC: 4.8 MMOL/L (ref 3.5–5.1)
RBC # BLD AUTO: 3.02 M/UL (ref 4–5.4)
SODIUM SERPL-SCNC: 141 MMOL/L (ref 136–145)
TSH SERPL DL<=0.005 MIU/L-ACNC: 0.93 UIU/ML (ref 0.4–4)
WBC # BLD AUTO: 4.47 K/UL (ref 3.9–12.7)

## 2019-07-29 PROCEDURE — 99213 OFFICE O/P EST LOW 20 MIN: CPT | Mod: PBBFAC | Performed by: INTERNAL MEDICINE

## 2019-07-29 PROCEDURE — 80048 BASIC METABOLIC PNL TOTAL CA: CPT

## 2019-07-29 PROCEDURE — 84443 ASSAY THYROID STIM HORMONE: CPT

## 2019-07-29 PROCEDURE — 36415 COLL VENOUS BLD VENIPUNCTURE: CPT

## 2019-07-29 PROCEDURE — 99215 PR OFFICE/OUTPT VISIT, EST, LEVL V, 40-54 MIN: ICD-10-PCS | Mod: S$PBB,,, | Performed by: INTERNAL MEDICINE

## 2019-07-29 PROCEDURE — 99215 OFFICE O/P EST HI 40 MIN: CPT | Mod: S$PBB,,, | Performed by: INTERNAL MEDICINE

## 2019-07-29 PROCEDURE — 99999 PR PBB SHADOW E&M-EST. PATIENT-LVL III: CPT | Mod: PBBFAC,,, | Performed by: INTERNAL MEDICINE

## 2019-07-29 PROCEDURE — 85025 COMPLETE CBC W/AUTO DIFF WBC: CPT

## 2019-07-29 PROCEDURE — 99999 PR PBB SHADOW E&M-EST. PATIENT-LVL III: ICD-10-PCS | Mod: PBBFAC,,, | Performed by: INTERNAL MEDICINE

## 2019-07-29 RX ORDER — AMLODIPINE BESYLATE 2.5 MG/1
2.5 TABLET ORAL 2 TIMES DAILY
Qty: 180 TABLET | Refills: 3 | Status: SHIPPED | OUTPATIENT
Start: 2019-07-29 | End: 2019-08-08 | Stop reason: ALTCHOICE

## 2019-07-29 RX ORDER — QUETIAPINE FUMARATE 25 MG/1
TABLET, FILM COATED ORAL
Qty: 90 TABLET | Refills: 1 | Status: SHIPPED | OUTPATIENT
Start: 2019-07-29 | End: 2019-09-20

## 2019-07-29 RX ORDER — MEMANTINE HYDROCHLORIDE 5 MG/1
5 TABLET ORAL 2 TIMES DAILY
Qty: 60 TABLET | Refills: 11 | Status: SHIPPED | OUTPATIENT
Start: 2019-07-29 | End: 2020-07-28

## 2019-07-29 RX ORDER — QUETIAPINE FUMARATE 25 MG/1
25 TABLET, FILM COATED ORAL NIGHTLY PRN
Qty: 30 TABLET | Refills: 1 | Status: SHIPPED | OUTPATIENT
Start: 2019-07-29 | End: 2019-07-29 | Stop reason: SDUPTHER

## 2019-07-29 NOTE — PATIENT INSTRUCTIONS
Stop amlodipine 5mg twice daily and start amlodipine 2.5mg twice daily.  Continue triamterene-hctz 37.5-25mg 5 days a week.    Start Namenda 5mg twice daily to help with dementia. Start seroquel 25mg nightly to help w/ hallucinations. If she's too drowsy on the seroquel, go ahead and stop that medicine.     Check urine today.     I'll check with Dr. Sanderson about the chest pain but let's get an EKG and echocardiogram.

## 2019-07-29 NOTE — Clinical Note
Dr. Sanderson,I saw Ms. Montelongo today. Her family said she recently complained of intermittent chest pains. But as you know, she's not able to give any details about it. I went ahead and ordered an EKG and repeated an echo although I've discussed w/ the family that given her age and comorbidity, we may not do anything additional about it. I'm repeating her renal function and checking UA. I've also suggested palliative care for her as she's progressively more confused. I decreased her amlodipine from 5mg BID to 2.5mg BID as her ankle swelling is worse. Let me know if there's anything you'd like to add. Dolly

## 2019-07-29 NOTE — PROGRESS NOTES
"Subjective:       Patient ID: Jayda Rendon is a 89 y.o. female.    Chief Complaint: Follow-up    HPI   Pt is known to me. Established care 1/8/19. Here for follow up.     C/o pt being more confused progressively. Having auditory hallucinations - talking to the TV and having conversations. Eating ok. Does not drink a lot of water.     HTN - diazide 37.5-25mg 5 days a week, amlodipine 5mg daily.  BP is on the lower side.   Elevate legs most days but still have BLE edema.     She got up last Saturday, c/o chest pain in the left side. Went and laid down and the pain went away. Poor historian due to confusion. Unable to give more details. Family's not sure if this is ongoing or when it started.      Mod to severe AS - follows yearly w/ Dr. Sanderson. Recently saw him 7/22/19 - decreased hctz to 5x/week.   Echo 4/2018 reviewed.      Hypothyroidism - synthroid 88mcg daily  FT4 wnl 7/15/19.      HLD - lovastatin 20mg daily.  LDL 74.8 7/15/19     Osteoporosis - alendronate 70mg weekly.   Last DEXA 2014.     CKD3. Cr 0.9-1.1 baseline but most recently Cr jumped to 1.5 7/15/19.    Review of Systems   Constitutional: Negative for chills and fever.   HENT: Negative for congestion and postnasal drip.    Respiratory: Positive for shortness of breath (not more than baseline but does have it w/ exertion.). Negative for cough.    Cardiovascular: Positive for chest pain and leg swelling. Negative for palpitations.   Gastrointestinal: Negative for abdominal pain, constipation and diarrhea.   Genitourinary: Negative for dysuria and frequency.   Musculoskeletal: Positive for arthralgias (shoulder).   Skin: Negative for rash.   Psychiatric/Behavioral: Positive for confusion and hallucinations.           Objective:      Physical Exam    BP 94/60 (BP Location: Right arm, Patient Position: Sitting, BP Method: Pediatric (Manual))   Pulse 63   Temp 98.9 °F (37.2 °C)   Ht 4' 11" (1.499 m)   SpO2 97%   BMI 22.00 kg/m²     Gen - A+O, NAD, " sitting in wheelchair.   HEENT - PERRL,OP clear. MMM. TM w/ cerumen.   Neck - no LAD  CV - RRR, III/VI LAUREN best at RUSB.   CHEST - CTAB, no crackles  Abd - S/NT/ND/+BS.   Ext - 2+ B radial. 1+ B pedal edema. B ankle edema, L>R.   Skin - no rash. Dry skin.     Previous labs reviewed.     Assessment/Plan     Jayda was seen today for follow-up.    Diagnoses and all orders for this visit:    Benign essential hypertension  Stop amlodipine 5mg twice daily and start amlodipine 2.5mg twice daily.  Continue triamterene-hctz 37.5-25mg 5 days a week.  -     amLODIPine (NORVASC) 2.5 MG tablet; Take 1 tablet (2.5 mg total) by mouth 2 (two) times daily.    Atypical chest pain - will send message to Dr. Sanderson. Given age and comorbidity, likely will conservatively manage.   -     SCHEDULED EKG 12-LEAD (to Muse); Future  -     Transthoracic echo (TTE) 2D with Color Flow; Future    Aortic valve stenosis, etiology of cardiac valve disease unspecified  -     SCHEDULED EKG 12-LEAD (to Muse); Future  -     Transthoracic echo (TTE) 2D with Color Flow; Future    Auditory hallucination  Start Namenda 5mg twice daily to help with dementia. Start seroquel 25mg nightly to help w/ hallucinations. If she's too drowsy on the seroquel, go ahead and stop that medicine.   -     Urinalysis; Future  -     Urinalysis Microscopic; Future  -     QUEtiapine (SEROQUEL) 25 MG Tab; Take 1 tablet (25 mg total) by mouth nightly as needed.    Late onset Alzheimer's disease without behavioral disturbance  -     QUEtiapine (SEROQUEL) 25 MG Tab; Take 1 tablet (25 mg total) by mouth nightly as needed.  -     memantine (NAMENDA) 5 MG Tab; Take 1 tablet (5 mg total) by mouth 2 (two) times daily.    CKD (chronic kidney disease) stage 3, GFR 30-59 ml/min - Cr a little higher than baseline on the 15th. Repeat today.   -     Basic metabolic panel; Future  -     CBC auto differential; Future    Hypothyroidism, unspecified type - cont synthroid.   -     TSH;  Future    Discussed possible referral to palliative care. Will discuss w/ family and let me know.     45 minutes was spent on patient with over half the time was spent in coordination of care and/or counseling.  Follow up in about 4 months (around 11/29/2019).      Dolly Grijalva MD  Department of Internal Medicine - [facility]  10:43 AM

## 2019-07-30 RX ORDER — TRIAMTERENE/HYDROCHLOROTHIAZID 37.5-25 MG
TABLET ORAL
Qty: 30 TABLET | Refills: 11 | Status: ON HOLD | OUTPATIENT
Start: 2019-07-30 | End: 2019-09-23 | Stop reason: SDUPTHER

## 2019-07-30 NOTE — TELEPHONE ENCOUNTER
Sarah, can you let Renée know that Jayda's urine and thyroid levels are normal. Anemia is stable. Kidney function is still mildly elevated from baseline but about the same as 2 weeks ago. Let's decrease the fluid pill to just 1/2 pill 5 days a week. Repeat BMP in a week.     Let her know that Dr. Sanderson (cardiologist) agrees w/ EKG and echo and rec conservative management. Thanks!

## 2019-07-30 NOTE — TELEPHONE ENCOUNTER
Called and spoke to Renée and she stated she wanted you to know for her tylenol arthritis the pt is currently taking 2 in the morning and 2 in the afternoon.  As far as her fluid pill she said they cant break in half due to it being in a capsule and not tablet. Would like to know what to do about that.

## 2019-08-02 ENCOUNTER — HOSPITAL ENCOUNTER (OUTPATIENT)
Dept: CARDIOLOGY | Facility: CLINIC | Age: 84
Discharge: HOME | End: 2019-08-02
Payer: MEDICARE

## 2019-08-02 ENCOUNTER — TELEPHONE (OUTPATIENT)
Dept: INTERNAL MEDICINE | Facility: CLINIC | Age: 84
End: 2019-08-02

## 2019-08-02 ENCOUNTER — HOSPITAL ENCOUNTER (EMERGENCY)
Facility: HOSPITAL | Age: 84
Discharge: HOME | End: 2019-08-02
Attending: EMERGENCY MEDICINE
Payer: MEDICARE

## 2019-08-02 ENCOUNTER — TELEPHONE (OUTPATIENT)
Dept: CARDIOLOGY | Facility: CLINIC | Age: 84
End: 2019-08-02

## 2019-08-02 VITALS
TEMPERATURE: 98 F | WEIGHT: 104 LBS | DIASTOLIC BLOOD PRESSURE: 82 MMHG | HEART RATE: 62 BPM | BODY MASS INDEX: 20.96 KG/M2 | OXYGEN SATURATION: 96 % | RESPIRATION RATE: 15 BRPM | HEIGHT: 59 IN | SYSTOLIC BLOOD PRESSURE: 129 MMHG

## 2019-08-02 DIAGNOSIS — S09.90XA HEAD INJURIES, INITIAL ENCOUNTER: ICD-10-CM
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
## 2019-08-02 DIAGNOSIS — S01.01XA SCALP LACERATION, INITIAL ENCOUNTER: ICD-10-CM

## 2019-08-02 DIAGNOSIS — I35.0 AORTIC VALVE STENOSIS, ETIOLOGY OF CARDIAC VALVE DISEASE UNSPECIFIED: ICD-10-CM

## 2019-08-02 DIAGNOSIS — R07.89 ATYPICAL CHEST PAIN: ICD-10-CM

## 2019-08-02 PROCEDURE — 12001 RPR S/N/AX/GEN/TRNK 2.5CM/<: CPT | Mod: ,,, | Performed by: EMERGENCY MEDICINE

## 2019-08-02 PROCEDURE — 99282 EMERGENCY DEPT VISIT SF MDM: CPT | Mod: 25,,, | Performed by: EMERGENCY MEDICINE

## 2019-08-02 PROCEDURE — 99283 EMERGENCY DEPT VISIT LOW MDM: CPT | Mod: 25

## 2019-08-02 PROCEDURE — 12001 RPR S/N/AX/GEN/TRNK 2.5CM/<: CPT

## 2019-08-02 PROCEDURE — 93010 ELECTROCARDIOGRAM REPORT: CPT | Mod: S$PBB,,, | Performed by: INTERNAL MEDICINE

## 2019-08-02 PROCEDURE — 93005 ELECTROCARDIOGRAM TRACING: CPT | Mod: PBBFAC,59 | Performed by: INTERNAL MEDICINE

## 2019-08-02 PROCEDURE — 93010 EKG 12-LEAD: ICD-10-PCS | Mod: S$PBB,,, | Performed by: INTERNAL MEDICINE

## 2019-08-02 PROCEDURE — 12001 PR RESUPERF WND BODY <2.5CM: ICD-10-PCS | Mod: ,,, | Performed by: EMERGENCY MEDICINE

## 2019-08-02 PROCEDURE — 99282 PR EMERGENCY DEPT VISIT,LEVEL II: ICD-10-PCS | Mod: 25,,, | Performed by: EMERGENCY MEDICINE

## 2019-08-02 NOTE — ED NOTES
Assumed care of patient. Patient resting quietly in bed, no apparent distress noted, resp w/ ease, vitals stable. Family at bedside.

## 2019-08-02 NOTE — ED NOTES
Patient identifiers verified and correct for Jayda Rendon   LOC: The patient is awake, alert and aware of environment with an appropriate affect, the patient is oriented x 3 and speaking appropriately.   APPEARANCE: Patient appears comfortable and in no acute distress, patient is clean and well groomed.  SKIN: The skin is warm and dry, color consistent with ethnicity, patient has normal skin turgor and moist mucus membranes, skin intact, no breakdown or bruising noted.   MUSCULOSKELETAL: Patient moving all extremities spontaneously, no swelling noted.  RESPIRATORY: Airway is open and patent, respirations are spontaneous, patient has a normal effort and rate, no accessory muscle use noted, pt placed on continuous pulse ox with O2 sats noted at 97% on room air.  CARDIAC: Pt placed on cardiac monitor. Patient has a normal rate and regular rhythm, no edema noted, capillary refill < 3 seconds.   GASTRO: Soft and non tender to palpation, no distention noted, normoactive bowel sounds present in all four quadrants. Pt states bowel movements have been regular.  : Pt denies any pain or frequency with urination.  NEURO: Pt opens eyes spontaneously, behavior appropriate to situation, follows commands, facial expression symmetrical, bilateral hand grasp equal and even, purposeful motor response noted, normal sensation in all extremities when touched with a finger.

## 2019-08-02 NOTE — ED NOTES
Pt presents to ED after unwitnessed trip and fall @ home. Pt who is a poor historian due to dementia, ambulates with walker @ baseline. Per pts nephew, pt was ambulating @ home with walker when she tripped and hit door frame causing her to fall. Pt has small lack to the backside of her head.

## 2019-08-02 NOTE — ED PROVIDER NOTES
Encounter Date: 8/2/2019       History     Chief Complaint   Patient presents with    Fall     arrives w/ c/o trip and fall. Small lac noted to back of head, no active bleeding. Per family no LOC or blood thinner use.      HPI   89 y.o female with h/o dementia presents to the ED with 2 nephews post fall. This AM when she was leaving the bathroom she slipped on the floor and hit the posterior aspect of her head. She denies any other injuries. Denies LOC, n/v. Denies pain  Pt lives with sister and a nephew.   Review of patient's allergies indicates:  No Known Allergies  Past Medical History:   Diagnosis Date    Arthritis     Cancer     melanoma    Hyperlipidemia     Hyperparathyroidism     Hypertension     Joint pain     Peripheral vascular disease     worse on the right than the left    Thyroid disease     hypothyroidism     Past Surgical History:   Procedure Laterality Date    APPENDECTOMY      Exam under anesthesia N/A 8/17/2018    [facility]    EXCISION-LESION-FACE Right 6/9/2014    Performed [facility] Carlos [facility]cdowell [facility] at [facility]    FISTULOTOMY  8/17/2018    [facility]    HEMORRHOID SURGERY      PEDICLE FLAP Right 6/9/2014    melolabial flap    WLE right cheek melanoma Right 6/9/2014     Family History   Problem Relation Age of Onset    Hypertension Mother     Cancer Mother     Hypertension Father     Cancer Father     No Known Problems Maternal Grandmother     Cancer Sister         lung    No Known Problems Maternal Aunt     No Known Problems Maternal Uncle     No Known Problems Paternal Aunt     No Known Problems Paternal Uncle     No Known Problems Maternal Grandfather     No Known Problems Paternal Grandmother     No Known Problems Paternal Grandfather     No Known Problems Sister     Melanoma Neg Hx     Heart attack Neg Hx     Heart disease Neg Hx      Social History     Tobacco Use     Smoking status: Never Smoker    Smokeless tobacco: Never Used    Tobacco comment:  the patient walksabout her house with a walker.she is limited byinstabilityin her left knee. She is Renée's  grandmother.   Substance Use Topics    Alcohol use: No    Drug use: No     Review of Systems   Constitutional: Negative for fever.   HENT: Negative for facial swelling.    Eyes: Negative for visual disturbance.   Respiratory: Negative for shortness of breath.    Cardiovascular: Negative for chest pain.   Gastrointestinal: Negative for nausea.   Endocrine: Negative.    Genitourinary: Negative for dysuria.   Musculoskeletal: Negative for back pain and neck pain.   Skin: Positive for wound. Negative for rash.   Neurological: Negative for weakness, light-headedness and numbness.   Hematological: Does not bruise/bleed easily.   Psychiatric/Behavioral: Negative.    All other systems reviewed and are negative.      Physical Exam     Initial Vitals [08/02/19 0610]   BP Pulse Resp Temp SpO2   127/61 75 15 98.4 °F (36.9 °C) 95 %      MAP       --         Physical Exam    Nursing note and vitals reviewed.  Constitutional: She appears well-developed.   HENT:   Head: Normocephalic.   1.5 cm laceration in the R occipital region   Eyes: EOM are normal. Pupils are equal, round, and reactive to light.   Neck: Normal range of motion. Neck supple.   Cardiovascular: Normal rate and regular rhythm.   Murmur heard.  2/6 systolic ejection murmur more pronounced in the RUSB   Pulmonary/Chest: No respiratory distress. She has no wheezes. She has no rales.   Abdominal: Soft. Bowel sounds are normal.   Neurological: She is alert.   Pleasantly confused   Skin: Skin is warm and dry.         ED Course   Lac Repair  Date/Time: 8/2/2019 8:00 AM  Performed by: Sierra Diana MD  Authorized by: Sierra Diana MD   Body area: head/neck  Location details: scalp  Skin closure: staples  Comments: 1 staple was placed        Labs Reviewed - No  data to display       Imaging Results          CT Head Without Contrast (Final result)  Result time 08/02/19 07:39:57    Final result by Christian Lloyd MD (08/02/19 07:39:57)                 Impression:      No acute findings.  Age related chronic changes.      Electronically signed by: Christian Lloyd MD  Date:    08/02/2019  Time:    07:39             Narrative:    EXAMINATION:  CT HEAD WITHOUT CONTRAST    CLINICAL HISTORY:  Head trauma, headache;    TECHNIQUE:  Low dose axial images were obtained through the head.  Coronal and sagittal reformations were also performed. Contrast was not administered.    COMPARISON:  None.    FINDINGS:  Noncontrast head CT performed with a history of trauma.  The examination demonstrates no acute intracranial hemorrhage or edema.  No extra-axial fluid collections are seen.  Involutional changes and white matter disease is appropriate for the patient's advanced age.  Ventricles and sulci are appropriate size for the patient's age.  No fracture is seen.  Atherosclerotic calcification is present in cavernous carotid arteries.    There is mild membrane thickening in the right maxillary sinus posteriorly.  Mastoid air cells clear.  No significant soft tissue swelling is observed.                               CT Cervical Spine Without Contrast (Final result)  Result time 08/02/19 07:47:11    Final result by Juan Antonio Chowdhury MD (08/02/19 07:47:11)                 Impression:      Multilevel chronic changes noted, correlation for any specific level of symptomatology is needed.    On close evaluation of available imaging when accounting for chronic change and artifact there is no evidence for acute cervical spine fracture deformity.      Electronically signed by: Juan Antonio Chowdhury  Date:    08/02/2019  Time:    07:47             Narrative:    EXAMINATION:  CT CERVICAL SPINE WITHOUT CONTRAST    CLINICAL HISTORY:  C-spine trauma, NEXUS/CCR positive, +risk factor(s);    TECHNIQUE:  Low dose  axial images, sagittal and coronal reformations were performed though the cervical spine.  Contrast was not administered.    COMPARISON:  None    FINDINGS:  Axial noncontrast CT examination of the cervical spine was performed.  Axial imaging, sagittal and coronal reconstruction imaging is submitted.  There is no prior examination available for comparison.  The examination was performed via osseous protocol.    On the sagittal reconstruction imaging there is straightening of the cervical spine there is minimal grade 1 anterolisthesis of C3 with respect to C4 and C4 with respect to C5 as well as C7 with respect to T1.  There is mild grade 1 anterolisthesis of T2 with respect to T3.  There is no high-grade spondylolisthesis, there is no evidence for high-grade or acute compression fracture deformity.  Multilevel facet arthropathy is noted there is no evidence for facet dislocation or facet fracture deformity.  The occipital condyles articulate appropriately with the superior articular facets of C1 at the craniocervical junction.  Multilevel chronic change of the cervical spine is noted, correlation for any specific level of symptomatology is needed.  On close evaluation of available imaging when accounting for chronic change and artifact there is no evidence for acute cervical spine fracture deformity.                                 Medical Decision Making:   Initial Assessment:   Jayda Rendon is a 89 y.o. female  presents w fall. My DDx includes but is not limited to ICH, cervical FX, . Will obtain CT head w/o contrast along with CT cervical spine . CT revealed no acute findings. 1 cm laceration repair with staples was performed. 1 staple was placed. Pt has been instructed to come back to the ED or f/u with PCP for staple removal.  Clinical Tests:   Radiological Study: Ordered and Reviewed              Attending Attestation:   Physician Attestation Statement for Resident:  As the supervising MD   Physician  Attestation Statement: I have personally seen and examined this patient.   I agree with the above history. -: Pt with witnessed mechanical fall, no LOC   As the supervising MD I agree with the above PE.   -: Laceration to posterior scalp  Alert, oriented, complains of headache and pain around lac   As the supervising MD I agree with the above treatment, course, plan, and disposition.   -: CT head and c-spine given mechanism, fall, risk factors eugene age  Age related changes to spine seen but nothing acute  Scalp lac was irrigated well with tap water, no local anesthesia, lac was repaired with one staple, pt tolerated the procedure well                       Clinical Impression:       ICD-10-CM ICD-9-CM   1. Fall against object W18.00XA E888.1   2. Head injuries, initial encounter S09.90XA 959.01   3. Scalp laceration, initial encounter S01.01XA 873.0                                Letitia La MD  Resident  08/02/19 2500       Sierra Diana MD  08/02/19 0721

## 2019-08-02 NOTE — TELEPHONE ENCOUNTER
Received message from Dr. Grijalva's office requesting appt with Dr. Sanderson.  Spoke with patient's nephew and patient was scheduled to see Dr Sanderson 8/8 at 10:30 am.    [facility] Staff   Phone Number: [phone]             Good afternoon,   Raimundo patient nephew would like to speak with someone regarding patient abnormal Echo.  Shows that patient had heart attack----family is wanting to  Know if they can consult with Dr. Sanderson regarding options----pls call Raimundo [phone]   Thanks

## 2019-08-07 PROBLEM — I42.0 DILATED CARDIOMYOPATHY: Status: ACTIVE | Noted: 2019-08-07

## 2019-08-08 ENCOUNTER — OFFICE VISIT (OUTPATIENT)
Dept: CARDIOLOGY | Facility: CLINIC | Age: 84
End: 2019-08-08
Payer: MEDICARE

## 2019-08-08 VITALS
BODY MASS INDEX: 22.58 KG/M2 | DIASTOLIC BLOOD PRESSURE: 52 MMHG | HEART RATE: 57 BPM | SYSTOLIC BLOOD PRESSURE: 104 MMHG | HEIGHT: 59 IN | WEIGHT: 112 LBS

## 2019-08-08 DIAGNOSIS — I34.0 NON-RHEUMATIC MITRAL REGURGITATION: ICD-10-CM

## 2019-08-08 DIAGNOSIS — I35.0 NONRHEUMATIC AORTIC VALVE STENOSIS: Primary | ICD-10-CM

## 2019-08-08 DIAGNOSIS — I25.10 CORONARY ARTERY DISEASE INVOLVING NATIVE CORONARY ARTERY OF NATIVE HEART WITHOUT ANGINA PECTORIS: ICD-10-CM

## 2019-08-08 DIAGNOSIS — I10 ESSENTIAL HYPERTENSION: ICD-10-CM

## 2019-08-08 DIAGNOSIS — I25.5 ISCHEMIC CARDIOMYOPATHY: ICD-10-CM

## 2019-08-08 DIAGNOSIS — E03.4 HYPOTHYROIDISM DUE TO ACQUIRED ATROPHY OF THYROID: ICD-10-CM

## 2019-08-08 DIAGNOSIS — C43.9 MALIGNANT MELANOMA, UNSPECIFIED SITE: ICD-10-CM

## 2019-08-08 DIAGNOSIS — E78.2 MIXED HYPERLIPIDEMIA: ICD-10-CM

## 2019-08-08 DIAGNOSIS — I42.0 DILATED CARDIOMYOPATHY: ICD-10-CM

## 2019-08-08 PROCEDURE — 99215 PR OFFICE/OUTPT VISIT, EST, LEVL V, 40-54 MIN: ICD-10-PCS | Mod: S$PBB,,, | Performed by: INTERNAL MEDICINE

## 2019-08-08 PROCEDURE — 99999 PR PBB SHADOW E&M-EST. PATIENT-LVL IV: ICD-10-PCS | Mod: PBBFAC,,, | Performed by: INTERNAL MEDICINE

## 2019-08-08 PROCEDURE — 99215 OFFICE O/P EST HI 40 MIN: CPT | Mod: S$PBB,,, | Performed by: INTERNAL MEDICINE

## 2019-08-08 PROCEDURE — 99999 PR PBB SHADOW E&M-EST. PATIENT-LVL IV: CPT | Mod: PBBFAC,,, | Performed by: INTERNAL MEDICINE

## 2019-08-08 PROCEDURE — 99214 OFFICE O/P EST MOD 30 MIN: CPT | Mod: PBBFAC | Performed by: INTERNAL MEDICINE

## 2019-08-08 RX ORDER — LOSARTAN POTASSIUM 25 MG/1
25 TABLET ORAL DAILY
Qty: 90 TABLET | Refills: 3 | Status: ON HOLD | OUTPATIENT
Start: 2019-08-08 | End: 2019-09-23 | Stop reason: HOSPADM

## 2019-08-08 RX ORDER — NITROGLYCERIN 0.3 MG/1
0.3 TABLET SUBLINGUAL EVERY 5 MIN PRN
Qty: 25 TABLET | Refills: 3 | Status: SHIPPED | OUTPATIENT
Start: 2019-08-08 | End: 2019-09-20

## 2019-08-08 NOTE — PATIENT INSTRUCTIONS
Discussed diet , achieving and maintaining ideal body weight, and exercise.   We reviewed meds in detail.  Reassured-discussed goals, options, plan.  Discussed omega -3 and co Q 10 200 mg per day  Explained TAVR.  NTG for chest pain  Change amlodipine to Losartan 25 mg just start with half

## 2019-08-08 NOTE — PROGRESS NOTES
Subjective:   Patient ID:  Jayda Rendon is a 89 y.o. female who presents for follow-up of abnormal echo    HPI: She was just in hospital for chest pain and echo was markedly abnormal and family wanted to discuss. The patient has no chest pain now but off and on, SOB, TIA, palpitations, syncope or pre-syncope.Memory and cognitive bad.      Review of Systems   Constitution: Negative for chills, decreased appetite, diaphoresis, fever, malaise/fatigue, night sweats, weight gain and weight loss.   HENT: Negative for congestion, hoarse voice, nosebleeds, sore throat and tinnitus.    Eyes: Negative for blurred vision, double vision, vision loss in left eye, vision loss in right eye, visual disturbance and visual halos.   Cardiovascular: Negative for chest pain, claudication, cyanosis, dyspnea on exertion, irregular heartbeat, leg swelling, near-syncope, orthopnea, palpitations, paroxysmal nocturnal dyspnea and syncope.   Respiratory: Negative for cough, hemoptysis, shortness of breath, sleep disturbances due to breathing, snoring, sputum production and wheezing.    Endocrine: Negative for cold intolerance, heat intolerance, polydipsia, polyphagia and polyuria.   Hematologic/Lymphatic: Negative for adenopathy and bleeding problem. Does not bruise/bleed easily.   Skin: Negative for color change, dry skin, flushing, itching, nail changes, poor wound healing, rash, skin cancer, suspicious lesions and unusual hair distribution.   Musculoskeletal: Negative for arthritis, back pain, falls, gout, joint pain, joint swelling, muscle cramps, muscle weakness, myalgias and stiffness.   Gastrointestinal: Negative for abdominal pain, anorexia, change in bowel habit, constipation, diarrhea, dysphagia, heartburn, hematemesis, hematochezia, melena and vomiting.   Genitourinary: Negative for decreased libido, dysuria, hematuria, hesitancy and urgency.   Neurological: Negative for excessive daytime sleepiness, dizziness, focal weakness,  "headaches, light-headedness, loss of balance, numbness, paresthesias, seizures, sensory change, tremors, vertigo and weakness.   Psychiatric/Behavioral: Negative for altered mental status, depression, hallucinations, memory loss, substance abuse and suicidal ideas. The patient does not have insomnia and is not nervous/anxious.    Allergic/Immunologic: Negative for environmental allergies and hives.       Objective: BP (!) 104/52 (BP Location: Left arm, Patient Position: Sitting, BP Method: Pediatric (Automatic))   Pulse (!) 57   Ht 4' 11" (1.499 m)   Wt 50.8 kg (111 lb 15.9 oz)   BMI 22.62 kg/m²      Physical Exam   Constitutional: She is oriented to person, place, and time. She appears well-developed and well-nourished.   HENT:   Head: Normocephalic.   Eyes: Pupils are equal, round, and reactive to light. EOM are normal.   Neck: Normal range of motion. Normal carotid pulses, no hepatojugular reflux and no JVD present. Carotid bruit is not present. No thyromegaly present.   Cardiovascular: Normal rate, regular rhythm and intact distal pulses. Exam reveals no gallop and no friction rub.   Murmur heard.   Systolic murmur is present with a grade of 3/6.  Pulmonary/Chest: Effort normal and breath sounds normal. No tachypnea. No respiratory distress. She has no wheezes. She has no rales. She exhibits no tenderness.   Abdominal: Soft. Bowel sounds are normal. She exhibits no distension and no mass. There is no tenderness. There is no rebound and no guarding.   Musculoskeletal: Normal range of motion. She exhibits no edema or tenderness.   Lymphadenopathy:     She has no cervical adenopathy.   Neurological: She is alert and oriented to person, place, and time. No cranial nerve deficit. Coordination normal.   Skin: Skin is warm. No rash noted. No erythema.   Psychiatric: She has a normal mood and affect. Her behavior is normal. Judgment and thought content normal.       Assessment:     1. Nonrheumatic aortic valve " stenosis    2. Non-rheumatic mitral regurgitation    3. Dilated cardiomyopathy    4. Mixed hyperlipidemia    5. Essential hypertension    6. Hypothyroidism due to acquired atrophy of thyroid    7. Malignant melanoma, unspecified site    8. Coronary artery disease involving native coronary artery of native heart without angina pectoris    9. Ischemic cardiomyopathy        Plan:   Discussed diet , achieving and maintaining ideal body weight, and exercise.   We reviewed meds in detail.  Reassured-discussed goals, options, plan.  Discussed omega -3 and co Q 10 200 mg per day  Explained TAVR.  NTG for chest pain  Change amlodipine to Losartan 25 mg just start with half    Jayda was seen today for nonrheumatic aortic valve stenosis and chest pain.    Diagnoses and all orders for this visit:    Nonrheumatic aortic valve stenosis  -     Basic metabolic panel; Standing  -     Comprehensive metabolic panel; Future; Expected date: 02/08/2020  -     TSH; Future; Expected date: 02/08/2020  -     EKG 12-lead; Future; Expected date: 02/08/2020    Non-rheumatic mitral regurgitation  -     Comprehensive metabolic panel; Future; Expected date: 02/08/2020  -     EKG 12-lead; Future; Expected date: 02/08/2020    Dilated cardiomyopathy  -     losartan (COZAAR) 25 MG tablet; Take 1 tablet (25 mg total) by mouth once daily.  -     nitroGLYCERIN (NITROSTAT) 0.3 MG SL tablet; Place 1 tablet (0.3 mg total) under the tongue every 5 (five) minutes as needed for Chest pain.  -     Basic metabolic panel; Standing  -     EKG 12-lead; Future; Expected date: 02/08/2020    Mixed hyperlipidemia  -     Lipid panel; Future; Expected date: 02/08/2020    Essential hypertension  -     Basic metabolic panel; Standing  -     Comprehensive metabolic panel; Future; Expected date: 02/08/2020  -     EKG 12-lead; Future; Expected date: 02/08/2020    Hypothyroidism due to acquired atrophy of thyroid  -     TSH; Future; Expected date: 02/08/2020  -     T4, free;  Future; Expected date: 02/08/2020    Malignant melanoma, unspecified site    Coronary artery disease involving native coronary artery of native heart without angina pectoris  -     losartan (COZAAR) 25 MG tablet; Take 1 tablet (25 mg total) by mouth once daily.  -     Lipid panel; Future; Expected date: 02/08/2020  -     Comprehensive metabolic panel; Future; Expected date: 02/08/2020    Ischemic cardiomyopathy  -     losartan (COZAAR) 25 MG tablet; Take 1 tablet (25 mg total) by mouth once daily.  -     nitroGLYCERIN (NITROSTAT) 0.3 MG SL tablet; Place 1 tablet (0.3 mg total) under the tongue every 5 (five) minutes as needed for Chest pain.            Follow up in about 6 months (around 2/8/2020) for with ECG and labs across street; [address] in 3 and 8 weeks.

## 2019-08-13 ENCOUNTER — OFFICE VISIT (OUTPATIENT)
Dept: INTERNAL MEDICINE | Facility: CLINIC | Age: 84
End: 2019-08-13
Payer: MEDICARE

## 2019-08-13 VITALS
HEART RATE: 78 BPM | WEIGHT: 112 LBS | BODY MASS INDEX: 22.58 KG/M2 | SYSTOLIC BLOOD PRESSURE: 110 MMHG | DIASTOLIC BLOOD PRESSURE: 68 MMHG | HEIGHT: 59 IN | OXYGEN SATURATION: 97 % | TEMPERATURE: 99 F

## 2019-08-13 DIAGNOSIS — Z48.02 ENCOUNTER FOR STAPLE REMOVAL: Primary | ICD-10-CM

## 2019-08-13 DIAGNOSIS — R21 RASH AND NONSPECIFIC SKIN ERUPTION: ICD-10-CM

## 2019-08-13 DIAGNOSIS — S01.91XA LACERATION OF HEAD WITHOUT FOREIGN BODY, UNSPECIFIED PART OF HEAD, INITIAL ENCOUNTER: ICD-10-CM

## 2019-08-13 PROCEDURE — 99213 PR OFFICE/OUTPT VISIT, EST, LEVL III, 20-29 MIN: ICD-10-PCS | Mod: S$PBB,,, | Performed by: PHYSICIAN ASSISTANT

## 2019-08-13 PROCEDURE — 99999 PR PBB SHADOW E&M-EST. PATIENT-LVL IV: ICD-10-PCS | Mod: PBBFAC,,, | Performed by: PHYSICIAN ASSISTANT

## 2019-08-13 PROCEDURE — 99214 OFFICE O/P EST MOD 30 MIN: CPT | Mod: PBBFAC | Performed by: PHYSICIAN ASSISTANT

## 2019-08-13 PROCEDURE — 99213 OFFICE O/P EST LOW 20 MIN: CPT | Mod: S$PBB,,, | Performed by: PHYSICIAN ASSISTANT

## 2019-08-13 PROCEDURE — 99999 PR PBB SHADOW E&M-EST. PATIENT-LVL IV: CPT | Mod: PBBFAC,,, | Performed by: PHYSICIAN ASSISTANT

## 2019-08-13 NOTE — PROGRESS NOTES
Subjective:       Patient ID: Jayda Rendon is a 89 y.o. female.    Chief Complaint: Suture / Staple Removal    Patient presents to clinic with her 2 nephews.  She fell coming out of the bathroom 13 days ago.  She was brought to the ER which required 1 stable in the back of the head for laceration.  The patient has severe Alzheimer's and dementia.  She is cared for by her 2 nephews in their wives.  They state they have been checking her head and deny any sign of infection or drainage from the area.  She is acting at her baseline.  She also woke up with some red marks on her face this morning.  She does not seem to be itching the areas or that they are bothering her but they are concerned since they just started Namenda for the patient they deny any new soap, lotion or detergent.  They states she has not had any difficulty eating or drinking or breathing.  They are not sure if this is due to the medication or coincidental.    Review of Systems   Constitutional: Negative for activity change and appetite change.   HENT: Negative for nosebleeds.    Eyes: Negative for pain and visual disturbance.   Respiratory: Negative for chest tightness and shortness of breath.    Cardiovascular: Negative for chest pain, palpitations and leg swelling.   Genitourinary: Negative for difficulty urinating, dysuria and frequency.   Musculoskeletal: Negative for arthralgias, back pain, gait problem, joint swelling and neck pain.   Skin: Positive for color change and rash. Negative for pallor and wound.   Neurological: Negative for dizziness, tremors, weakness, light-headedness, numbness and headaches.       Objective:      Physical Exam   Constitutional: She is oriented to person, place, and time. She appears well-developed and well-nourished.   HENT:   Head: Normocephalic and atraumatic.       Eyes: Pupils are equal, round, and reactive to light. Conjunctivae are normal.   Neck: Normal range of motion. Neck supple. No JVD present.    Cardiovascular: Normal rate and regular rhythm. Exam reveals no gallop and no friction rub.   No murmur heard.  Pulmonary/Chest: Effort normal and breath sounds normal. No respiratory distress. She has no wheezes. She has no rales.   Neurological: She is alert and oriented to person, place, and time.   Skin: Skin is warm and dry. Rash noted. There is erythema.   Several small erythematous nonblanching areas of skin involving the cheeks and the nose.  No signs of infection.  No signs of pain to the patient.  No extensive warmth   Psychiatric: She has a normal mood and affect. Her behavior is normal. Judgment and thought content normal.       Assessment:       1. Encounter for staple removal    2. Laceration of head without foreign body, unspecified part of head, initial encounter    3. Rash and nonspecific skin eruption        Plan:       Jayda was seen today for suture / staple removal.    Diagnoses and all orders for this visit:    Encounter for staple removal  Comments:  One staple removed successfully without complication.    Laceration of head without foreign body, unspecified part of head, initial encounter  Comments:  Resolved, well-healed    Rash and nonspecific skin eruption  Comments:  Nephews to continue to keep an eye on the skin outbreak and if worsens let us know.  We will stop the Namenda and see if it resolves.

## 2019-08-21 ENCOUNTER — TELEPHONE (OUTPATIENT)
Dept: INTERNAL MEDICINE | Facility: CLINIC | Age: 84
End: 2019-08-21

## 2019-08-21 DIAGNOSIS — I42.0 DILATED CARDIOMYOPATHY: Primary | ICD-10-CM

## 2019-08-21 DIAGNOSIS — M19.90 ARTHRITIS: ICD-10-CM

## 2019-08-21 DIAGNOSIS — I73.9 PAD (PERIPHERAL ARTERY DISEASE): ICD-10-CM

## 2019-09-05 ENCOUNTER — LAB VISIT (OUTPATIENT)
Dept: LAB | Facility: HOSPITAL | Age: 84
End: 2019-09-05
Attending: INTERNAL MEDICINE
Payer: MEDICARE

## 2019-09-05 DIAGNOSIS — I35.0 NONRHEUMATIC AORTIC VALVE STENOSIS: ICD-10-CM

## 2019-09-05 DIAGNOSIS — I42.0 DILATED CARDIOMYOPATHY: ICD-10-CM

## 2019-09-05 DIAGNOSIS — I10 ESSENTIAL HYPERTENSION: ICD-10-CM

## 2019-09-05 LAB
ANION GAP SERPL CALC-SCNC: 7 MMOL/L (ref 8–16)
BUN SERPL-MCNC: 31 MG/DL (ref 10–30)
CALCIUM SERPL-MCNC: 9.9 MG/DL (ref 8.7–10.5)
CHLORIDE SERPL-SCNC: 110 MMOL/L (ref 95–110)
CO2 SERPL-SCNC: 23 MMOL/L (ref 23–29)
CREAT SERPL-MCNC: 1.3 MG/DL (ref 0.5–1.4)
EST. GFR  (AFRICAN AMERICAN): 40 ML/MIN/1.73 M^2
EST. GFR  (NON AFRICAN AMERICAN): 35 ML/MIN/1.73 M^2
GLUCOSE SERPL-MCNC: 136 MG/DL (ref 70–110)
POTASSIUM SERPL-SCNC: 4.4 MMOL/L (ref 3.5–5.1)
SODIUM SERPL-SCNC: 140 MMOL/L (ref 136–145)

## 2019-09-05 PROCEDURE — 80048 BASIC METABOLIC PNL TOTAL CA: CPT

## 2019-09-05 PROCEDURE — 36415 COLL VENOUS BLD VENIPUNCTURE: CPT

## 2019-09-20 ENCOUNTER — HOSPITAL ENCOUNTER (INPATIENT)
Facility: HOSPITAL | Age: 84
LOS: 3 days | Discharge: HOSPICE | DRG: 280 | End: 2019-09-23
Attending: EMERGENCY MEDICINE | Admitting: HOSPITALIST
Payer: MEDICARE

## 2019-09-20 DIAGNOSIS — E03.4 HYPOTHYROIDISM DUE TO ACQUIRED ATROPHY OF THYROID: ICD-10-CM

## 2019-09-20 DIAGNOSIS — E21.3 HYPERPARATHYROIDISM: ICD-10-CM

## 2019-09-20 DIAGNOSIS — R78.81 E COLI BACTEREMIA: ICD-10-CM

## 2019-09-20 DIAGNOSIS — Z51.5 PALLIATIVE CARE ENCOUNTER: ICD-10-CM

## 2019-09-20 DIAGNOSIS — R07.9 CHEST PAIN: ICD-10-CM

## 2019-09-20 DIAGNOSIS — I73.9 PAD (PERIPHERAL ARTERY DISEASE): ICD-10-CM

## 2019-09-20 DIAGNOSIS — E78.2 MIXED HYPERLIPIDEMIA: ICD-10-CM

## 2019-09-20 DIAGNOSIS — Z51.5 ENCOUNTER FOR PALLIATIVE CARE IN HOME HOSPICE: ICD-10-CM

## 2019-09-20 DIAGNOSIS — I50.9 ACUTE DECOMPENSATED HEART FAILURE: ICD-10-CM

## 2019-09-20 DIAGNOSIS — R79.89 ELEVATED TROPONIN: ICD-10-CM

## 2019-09-20 DIAGNOSIS — I10 ESSENTIAL HYPERTENSION: ICD-10-CM

## 2019-09-20 DIAGNOSIS — I35.0 NONRHEUMATIC AORTIC VALVE STENOSIS: ICD-10-CM

## 2019-09-20 DIAGNOSIS — I21.4 NSTEMI (NON-ST ELEVATION MYOCARDIAL INFARCTION): ICD-10-CM

## 2019-09-20 DIAGNOSIS — I34.0 NON-RHEUMATIC MITRAL REGURGITATION: ICD-10-CM

## 2019-09-20 DIAGNOSIS — Z71.89 GOALS OF CARE, COUNSELING/DISCUSSION: ICD-10-CM

## 2019-09-20 DIAGNOSIS — I21.4 NSTEMI (NON-ST ELEVATED MYOCARDIAL INFARCTION): Primary | ICD-10-CM

## 2019-09-20 DIAGNOSIS — I48.91 A-FIB: ICD-10-CM

## 2019-09-20 DIAGNOSIS — B96.20 E COLI BACTEREMIA: ICD-10-CM

## 2019-09-20 PROBLEM — I48.0 PAROXYSMAL ATRIAL FIBRILLATION: Status: ACTIVE | Noted: 2019-09-20

## 2019-09-20 PROBLEM — I50.23 ACUTE ON CHRONIC SYSTOLIC HEART FAILURE: Status: ACTIVE | Noted: 2019-09-20

## 2019-09-20 LAB
ALBUMIN SERPL BCP-MCNC: 3.7 G/DL (ref 3.5–5.2)
ALP SERPL-CCNC: 61 U/L (ref 55–135)
ALT SERPL W/O P-5'-P-CCNC: 7 U/L (ref 10–44)
ANION GAP SERPL CALC-SCNC: 14 MMOL/L (ref 8–16)
APTT BLDCRRT: 111.6 SEC (ref 21–32)
APTT BLDCRRT: 22.2 SEC (ref 21–32)
APTT BLDCRRT: 24.6 SEC (ref 21–32)
APTT BLDCRRT: 74.5 SEC (ref 21–32)
ASCENDING AORTA: 3.1 CM
AST SERPL-CCNC: 13 U/L (ref 10–40)
AV INDEX (PROSTH): 0.21
AV MEAN GRADIENT: 28 MMHG
AV PEAK GRADIENT: 44 MMHG
AV VALVE AREA: 0.65 CM2
AV VELOCITY RATIO: 0.21
BACTERIA #/AREA URNS AUTO: ABNORMAL /HPF
BASOPHILS # BLD AUTO: 0.05 K/UL (ref 0–0.2)
BASOPHILS NFR BLD: 0.7 % (ref 0–1.9)
BILIRUB SERPL-MCNC: 0.4 MG/DL (ref 0.1–1)
BILIRUB UR QL STRIP: NEGATIVE
BNP SERPL-MCNC: 2426 PG/ML (ref 0–99)
BUN SERPL-MCNC: 34 MG/DL (ref 10–30)
CALCIUM SERPL-MCNC: 9.8 MG/DL (ref 8.7–10.5)
CHLORIDE SERPL-SCNC: 111 MMOL/L (ref 95–110)
CHOLEST SERPL-MCNC: 163 MG/DL (ref 120–199)
CHOLEST/HDLC SERPL: 2.5 {RATIO} (ref 2–5)
CLARITY UR REFRACT.AUTO: ABNORMAL
CO2 SERPL-SCNC: 17 MMOL/L (ref 23–29)
COLOR UR AUTO: YELLOW
CREAT SERPL-MCNC: 1.4 MG/DL (ref 0.5–1.4)
CV ECHO LV RWT: 0.3 CM
DIFFERENTIAL METHOD: ABNORMAL
DOP CALC AO PEAK VEL: 3.3 M/S
DOP CALC AO VTI: 68 CM
DOP CALC LVOT AREA: 3.1 CM2
DOP CALC LVOT DIAMETER: 2 CM
DOP CALC LVOT PEAK VEL: 0.7 M/S
DOP CALC LVOT STROKE VOLUME: 43.93 CM3
DOP CALCLVOT PEAK VEL VTI: 13.99 CM
E WAVE DECELERATION TIME: 185.65 MSEC
E/A RATIO: 1.8
E/E' RATIO: 20.86 M/S
ECHO LV POSTERIOR WALL: 0.72 CM (ref 0.6–1.1)
EOSINOPHIL # BLD AUTO: 0.1 K/UL (ref 0–0.5)
EOSINOPHIL NFR BLD: 2 % (ref 0–8)
ERYTHROCYTE [DISTWIDTH] IN BLOOD BY AUTOMATED COUNT: 15.5 % (ref 11.5–14.5)
EST. GFR  (AFRICAN AMERICAN): 36.8 ML/MIN/1.73 M^2
EST. GFR  (NON AFRICAN AMERICAN): 32 ML/MIN/1.73 M^2
ESTIMATED AVG GLUCOSE: 100 MG/DL (ref 68–131)
ESTIMATED AVG GLUCOSE: 94 MG/DL (ref 68–131)
FRACTIONAL SHORTENING: 12 % (ref 28–44)
GLUCOSE SERPL-MCNC: 152 MG/DL (ref 70–110)
GLUCOSE UR QL STRIP: NEGATIVE
HBA1C MFR BLD HPLC: 4.9 % (ref 4–5.6)
HBA1C MFR BLD HPLC: 5.1 % (ref 4–5.6)
HCT VFR BLD AUTO: 33.1 % (ref 37–48.5)
HDLC SERPL-MCNC: 64 MG/DL (ref 40–75)
HDLC SERPL: 39.3 % (ref 20–50)
HGB BLD-MCNC: 10.4 G/DL (ref 12–16)
HGB UR QL STRIP: ABNORMAL
HYALINE CASTS UR QL AUTO: 3 /LPF
IMM GRANULOCYTES # BLD AUTO: 0.02 K/UL (ref 0–0.04)
IMM GRANULOCYTES NFR BLD AUTO: 0.3 % (ref 0–0.5)
INR PPP: 1 (ref 0.8–1.2)
INR PPP: 1 (ref 0.8–1.2)
INTERVENTRICULAR SEPTUM: 0.95 CM (ref 0.6–1.1)
KETONES UR QL STRIP: NEGATIVE
LA MAJOR: 6.7 CM
LA MINOR: 6.7 CM
LA WIDTH: 6 CM
LDLC SERPL CALC-MCNC: 77.6 MG/DL (ref 63–159)
LEFT ATRIUM SIZE: 6 CM
LEFT ATRIUM VOLUME: 205.02 CM3
LEFT INTERNAL DIMENSION IN SYSTOLE: 4.27 CM (ref 2.1–4)
LEFT VENTRICLE DIASTOLIC VOLUME: 109.81 ML
LEFT VENTRICLE SYSTOLIC VOLUME: 81.84 ML
LEFT VENTRICULAR INTERNAL DIMENSION IN DIASTOLE: 4.84 CM (ref 3.5–6)
LEFT VENTRICULAR MASS: 135.81 G
LEUKOCYTE ESTERASE UR QL STRIP: NEGATIVE
LV LATERAL E/E' RATIO: 20.86 M/S
LV SEPTAL E/E' RATIO: 20.86 M/S
LYMPHOCYTES # BLD AUTO: 2.6 K/UL (ref 1–4.8)
LYMPHOCYTES NFR BLD: 36.2 % (ref 18–48)
MCH RBC QN AUTO: 33.4 PG (ref 27–31)
MCHC RBC AUTO-ENTMCNC: 31.4 G/DL (ref 32–36)
MCV RBC AUTO: 106 FL (ref 82–98)
MICROSCOPIC COMMENT: ABNORMAL
MONOCYTES # BLD AUTO: 0.7 K/UL (ref 0.3–1)
MONOCYTES NFR BLD: 9.5 % (ref 4–15)
MV PEAK A VEL: 0.81 M/S
MV PEAK E VEL: 1.46 M/S
NEUTROPHILS # BLD AUTO: 3.6 K/UL (ref 1.8–7.7)
NEUTROPHILS NFR BLD: 51.3 % (ref 38–73)
NITRITE UR QL STRIP: NEGATIVE
NONHDLC SERPL-MCNC: 99 MG/DL
NRBC BLD-RTO: 0 /100 WBC
PH UR STRIP: 5 [PH] (ref 5–8)
PHOSPHATE SERPL-MCNC: 3.4 MG/DL (ref 2.7–4.5)
PISA TR MAX VEL: 4.15 M/S
PLATELET # BLD AUTO: 164 K/UL (ref 150–350)
PMV BLD AUTO: 10.4 FL (ref 9.2–12.9)
POTASSIUM SERPL-SCNC: 4.1 MMOL/L (ref 3.5–5.1)
PROT SERPL-MCNC: 6.6 G/DL (ref 6–8.4)
PROT UR QL STRIP: ABNORMAL
PROTHROMBIN TIME: 10.3 SEC (ref 9–12.5)
PROTHROMBIN TIME: 10.7 SEC (ref 9–12.5)
RA MAJOR: 3.99 CM
RA PRESSURE: 8 MMHG
RA WIDTH: 3.78 CM
RBC # BLD AUTO: 3.11 M/UL (ref 4–5.4)
RBC #/AREA URNS AUTO: 1 /HPF (ref 0–4)
RIGHT VENTRICULAR END-DIASTOLIC DIMENSION: 2.65 CM
SINUS: 2.97 CM
SODIUM SERPL-SCNC: 142 MMOL/L (ref 136–145)
SP GR UR STRIP: 1.01 (ref 1–1.03)
SQUAMOUS #/AREA URNS AUTO: 0 /HPF
STJ: 2.93 CM
T4 FREE SERPL-MCNC: 1.36 NG/DL (ref 0.71–1.51)
TDI LATERAL: 0.07 M/S
TDI SEPTAL: 0.07 M/S
TDI: 0.07 M/S
TR MAX PG: 69 MMHG
TRIGL SERPL-MCNC: 107 MG/DL (ref 30–150)
TROPONIN I SERPL DL<=0.01 NG/ML-MCNC: 1.3 NG/ML (ref 0–0.03)
TROPONIN I SERPL DL<=0.01 NG/ML-MCNC: 12.47 NG/ML (ref 0–0.03)
TROPONIN I SERPL DL<=0.01 NG/ML-MCNC: 46.67 NG/ML (ref 0–0.03)
TSH SERPL DL<=0.005 MIU/L-ACNC: 2.58 UIU/ML (ref 0.4–4)
TV REST PULMONARY ARTERY PRESSURE: 77 MMHG
URN SPEC COLLECT METH UR: ABNORMAL
WBC # BLD AUTO: 7.08 K/UL (ref 3.9–12.7)
WBC #/AREA URNS AUTO: 3 /HPF (ref 0–5)

## 2019-09-20 PROCEDURE — 99223 PR INITIAL HOSPITAL CARE,LEVL III: ICD-10-PCS | Mod: GC,,, | Performed by: INTERNAL MEDICINE

## 2019-09-20 PROCEDURE — 85610 PROTHROMBIN TIME: CPT | Mod: 91

## 2019-09-20 PROCEDURE — 83036 HEMOGLOBIN GLYCOSYLATED A1C: CPT | Mod: 91

## 2019-09-20 PROCEDURE — 63600175 PHARM REV CODE 636 W HCPCS: Performed by: EMERGENCY MEDICINE

## 2019-09-20 PROCEDURE — 63600175 PHARM REV CODE 636 W HCPCS: Performed by: HOSPITALIST

## 2019-09-20 PROCEDURE — 96374 THER/PROPH/DIAG INJ IV PUSH: CPT

## 2019-09-20 PROCEDURE — 99223 1ST HOSP IP/OBS HIGH 75: CPT | Mod: GC,,, | Performed by: INTERNAL MEDICINE

## 2019-09-20 PROCEDURE — 83036 HEMOGLOBIN GLYCOSYLATED A1C: CPT

## 2019-09-20 PROCEDURE — 87077 CULTURE AEROBIC IDENTIFY: CPT

## 2019-09-20 PROCEDURE — 84443 ASSAY THYROID STIM HORMONE: CPT

## 2019-09-20 PROCEDURE — 99291 PR CRITICAL CARE, E/M 30-74 MINUTES: ICD-10-PCS | Mod: ,,, | Performed by: EMERGENCY MEDICINE

## 2019-09-20 PROCEDURE — 25000003 PHARM REV CODE 250: Performed by: NURSE PRACTITIONER

## 2019-09-20 PROCEDURE — 84484 ASSAY OF TROPONIN QUANT: CPT

## 2019-09-20 PROCEDURE — 93010 ELECTROCARDIOGRAM REPORT: CPT | Mod: ,,, | Performed by: INTERNAL MEDICINE

## 2019-09-20 PROCEDURE — 99223 1ST HOSP IP/OBS HIGH 75: CPT | Mod: AI,,, | Performed by: NURSE PRACTITIONER

## 2019-09-20 PROCEDURE — 83880 ASSAY OF NATRIURETIC PEPTIDE: CPT

## 2019-09-20 PROCEDURE — 63600175 PHARM REV CODE 636 W HCPCS: Performed by: NURSE PRACTITIONER

## 2019-09-20 PROCEDURE — 93005 ELECTROCARDIOGRAM TRACING: CPT

## 2019-09-20 PROCEDURE — 84100 ASSAY OF PHOSPHORUS: CPT

## 2019-09-20 PROCEDURE — 25000003 PHARM REV CODE 250: Performed by: EMERGENCY MEDICINE

## 2019-09-20 PROCEDURE — 85025 COMPLETE CBC W/AUTO DIFF WBC: CPT

## 2019-09-20 PROCEDURE — 80053 COMPREHEN METABOLIC PANEL: CPT

## 2019-09-20 PROCEDURE — 99291 CRITICAL CARE FIRST HOUR: CPT | Mod: 25

## 2019-09-20 PROCEDURE — 25000003 PHARM REV CODE 250: Performed by: STUDENT IN AN ORGANIZED HEALTH CARE EDUCATION/TRAINING PROGRAM

## 2019-09-20 PROCEDURE — 99291 CRITICAL CARE FIRST HOUR: CPT | Mod: ,,, | Performed by: EMERGENCY MEDICINE

## 2019-09-20 PROCEDURE — 85730 THROMBOPLASTIN TIME PARTIAL: CPT

## 2019-09-20 PROCEDURE — 85730 THROMBOPLASTIN TIME PARTIAL: CPT | Mod: 91

## 2019-09-20 PROCEDURE — 99223 PR INITIAL HOSPITAL CARE,LEVL III: ICD-10-PCS | Mod: ,,, | Performed by: EMERGENCY MEDICINE

## 2019-09-20 PROCEDURE — 87040 BLOOD CULTURE FOR BACTERIA: CPT | Mod: 59

## 2019-09-20 PROCEDURE — 87186 SC STD MICRODIL/AGAR DIL: CPT

## 2019-09-20 PROCEDURE — 81001 URINALYSIS AUTO W/SCOPE: CPT

## 2019-09-20 PROCEDURE — 36415 COLL VENOUS BLD VENIPUNCTURE: CPT

## 2019-09-20 PROCEDURE — 84439 ASSAY OF FREE THYROXINE: CPT

## 2019-09-20 PROCEDURE — 99223 1ST HOSP IP/OBS HIGH 75: CPT | Mod: ,,, | Performed by: EMERGENCY MEDICINE

## 2019-09-20 PROCEDURE — 20600001 HC STEP DOWN PRIVATE ROOM

## 2019-09-20 PROCEDURE — 80061 LIPID PANEL: CPT

## 2019-09-20 PROCEDURE — 93010 EKG 12-LEAD: ICD-10-PCS | Mod: ,,, | Performed by: INTERNAL MEDICINE

## 2019-09-20 PROCEDURE — 84484 ASSAY OF TROPONIN QUANT: CPT | Mod: 91

## 2019-09-20 PROCEDURE — 99223 PR INITIAL HOSPITAL CARE,LEVL III: ICD-10-PCS | Mod: AI,,, | Performed by: NURSE PRACTITIONER

## 2019-09-20 PROCEDURE — 96375 TX/PRO/DX INJ NEW DRUG ADDON: CPT

## 2019-09-20 RX ORDER — RAMELTEON 8 MG/1
8 TABLET ORAL NIGHTLY PRN
Status: DISCONTINUED | OUTPATIENT
Start: 2019-09-20 | End: 2019-09-23 | Stop reason: HOSPADM

## 2019-09-20 RX ORDER — DOCUSATE SODIUM 100 MG/1
100 CAPSULE, LIQUID FILLED ORAL 2 TIMES DAILY
Status: DISCONTINUED | OUTPATIENT
Start: 2019-09-20 | End: 2019-09-23 | Stop reason: HOSPADM

## 2019-09-20 RX ORDER — SODIUM CHLORIDE 0.9 % (FLUSH) 0.9 %
10 SYRINGE (ML) INJECTION
Status: DISCONTINUED | OUTPATIENT
Start: 2019-09-20 | End: 2019-09-23 | Stop reason: HOSPADM

## 2019-09-20 RX ORDER — CLOPIDOGREL BISULFATE 75 MG/1
75 TABLET ORAL DAILY
Status: DISCONTINUED | OUTPATIENT
Start: 2019-09-21 | End: 2019-09-23 | Stop reason: HOSPADM

## 2019-09-20 RX ORDER — LEVOTHYROXINE SODIUM 88 UG/1
88 TABLET ORAL DAILY
Status: DISCONTINUED | OUTPATIENT
Start: 2019-09-20 | End: 2019-09-20

## 2019-09-20 RX ORDER — GLUCAGON 1 MG
1 KIT INJECTION
Status: DISCONTINUED | OUTPATIENT
Start: 2019-09-20 | End: 2019-09-23 | Stop reason: HOSPADM

## 2019-09-20 RX ORDER — IBUPROFEN 200 MG
24 TABLET ORAL
Status: DISCONTINUED | OUTPATIENT
Start: 2019-09-20 | End: 2019-09-23 | Stop reason: HOSPADM

## 2019-09-20 RX ORDER — FUROSEMIDE 10 MG/ML
40 INJECTION INTRAMUSCULAR; INTRAVENOUS
Status: COMPLETED | OUTPATIENT
Start: 2019-09-20 | End: 2019-09-20

## 2019-09-20 RX ORDER — CHOLECALCIFEROL (VITAMIN D3) 25 MCG
1000 TABLET ORAL DAILY
Status: DISCONTINUED | OUTPATIENT
Start: 2019-09-20 | End: 2019-09-23 | Stop reason: HOSPADM

## 2019-09-20 RX ORDER — ACETAMINOPHEN 325 MG/1
650 TABLET ORAL EVERY 12 HOURS
Status: DISCONTINUED | OUTPATIENT
Start: 2019-09-20 | End: 2019-09-23 | Stop reason: HOSPADM

## 2019-09-20 RX ORDER — ASPIRIN 81 MG/1
81 TABLET ORAL DAILY
Status: DISCONTINUED | OUTPATIENT
Start: 2019-09-21 | End: 2019-09-23 | Stop reason: HOSPADM

## 2019-09-20 RX ORDER — MEMANTINE HYDROCHLORIDE 5 MG/1
5 TABLET ORAL 2 TIMES DAILY
Status: DISCONTINUED | OUTPATIENT
Start: 2019-09-20 | End: 2019-09-23 | Stop reason: HOSPADM

## 2019-09-20 RX ORDER — HEPARIN SODIUM,PORCINE/D5W 25000/250
12 INTRAVENOUS SOLUTION INTRAVENOUS CONTINUOUS
Status: DISCONTINUED | OUTPATIENT
Start: 2019-09-20 | End: 2019-09-22

## 2019-09-20 RX ORDER — ASPIRIN 325 MG
325 TABLET ORAL
Status: COMPLETED | OUTPATIENT
Start: 2019-09-20 | End: 2019-09-20

## 2019-09-20 RX ORDER — IBUPROFEN 200 MG
16 TABLET ORAL
Status: DISCONTINUED | OUTPATIENT
Start: 2019-09-20 | End: 2019-09-23 | Stop reason: HOSPADM

## 2019-09-20 RX ORDER — LEVOTHYROXINE SODIUM 88 UG/1
88 TABLET ORAL DAILY
Status: DISCONTINUED | OUTPATIENT
Start: 2019-09-21 | End: 2019-09-20

## 2019-09-20 RX ORDER — CLOPIDOGREL 300 MG/1
300 TABLET, FILM COATED ORAL ONCE
Status: COMPLETED | OUTPATIENT
Start: 2019-09-20 | End: 2019-09-20

## 2019-09-20 RX ORDER — LOVASTATIN 20 MG/1
20 TABLET ORAL NIGHTLY
Status: DISCONTINUED | OUTPATIENT
Start: 2019-09-20 | End: 2019-09-23 | Stop reason: HOSPADM

## 2019-09-20 RX ORDER — CLOPIDOGREL BISULFATE 75 MG/1
75 TABLET ORAL DAILY
Qty: 30 TABLET | Refills: 2 | Status: SHIPPED | OUTPATIENT
Start: 2019-09-20 | End: 2020-09-19

## 2019-09-20 RX ORDER — POLYETHYLENE GLYCOL 3350 17 G/17G
17 POWDER, FOR SOLUTION ORAL DAILY PRN
Status: DISCONTINUED | OUTPATIENT
Start: 2019-09-20 | End: 2019-09-23 | Stop reason: HOSPADM

## 2019-09-20 RX ORDER — DILTIAZEM HYDROCHLORIDE 5 MG/ML
10 INJECTION INTRAVENOUS
Status: COMPLETED | OUTPATIENT
Start: 2019-09-20 | End: 2019-09-20

## 2019-09-20 RX ORDER — CILOSTAZOL 50 MG/1
100 TABLET ORAL 2 TIMES DAILY
Status: DISCONTINUED | OUTPATIENT
Start: 2019-09-20 | End: 2019-09-23

## 2019-09-20 RX ORDER — NITROGLYCERIN 0.3 MG/1
0.3 TABLET SUBLINGUAL EVERY 5 MIN PRN
Status: DISCONTINUED | OUTPATIENT
Start: 2019-09-20 | End: 2019-09-23 | Stop reason: HOSPADM

## 2019-09-20 RX ORDER — FERROUS SULFATE 325(65) MG
325 TABLET, DELAYED RELEASE (ENTERIC COATED) ORAL EVERY OTHER DAY
Status: DISCONTINUED | OUTPATIENT
Start: 2019-09-20 | End: 2019-09-23 | Stop reason: HOSPADM

## 2019-09-20 RX ORDER — LEVOTHYROXINE SODIUM 88 UG/1
88 TABLET ORAL
Status: DISCONTINUED | OUTPATIENT
Start: 2019-09-21 | End: 2019-09-23 | Stop reason: HOSPADM

## 2019-09-20 RX ADMIN — CLOPIDOGREL BISULFATE 300 MG: 300 TABLET, FILM COATED ORAL at 01:09

## 2019-09-20 RX ADMIN — VITAMIN D, TAB 1000IU (100/BT) 1000 UNITS: 25 TAB at 01:09

## 2019-09-20 RX ADMIN — DOCUSATE SODIUM 100 MG: 100 CAPSULE, LIQUID FILLED ORAL at 09:09

## 2019-09-20 RX ADMIN — FERROUS SULFATE TAB EC 325 MG (65 MG FE EQUIVALENT) 325 MG: 325 (65 FE) TABLET DELAYED RESPONSE at 01:09

## 2019-09-20 RX ADMIN — ACETAMINOPHEN 650 MG: 325 TABLET ORAL at 01:09

## 2019-09-20 RX ADMIN — MEMANTINE HYDROCHLORIDE 5 MG: 5 TABLET ORAL at 01:09

## 2019-09-20 RX ADMIN — MEMANTINE HYDROCHLORIDE 5 MG: 5 TABLET ORAL at 09:09

## 2019-09-20 RX ADMIN — CILOSTAZOL 100 MG: 50 TABLET ORAL at 09:09

## 2019-09-20 RX ADMIN — HEPARIN SODIUM 12 UNITS/KG/HR: 10000 INJECTION, SOLUTION INTRAVENOUS at 01:09

## 2019-09-20 RX ADMIN — DOCUSATE SODIUM 100 MG: 100 CAPSULE, LIQUID FILLED ORAL at 01:09

## 2019-09-20 RX ADMIN — SODIUM CHLORIDE 1000 ML: 0.9 INJECTION, SOLUTION INTRAVENOUS at 06:09

## 2019-09-20 RX ADMIN — LOVASTATIN 20 MG: 20 TABLET ORAL at 09:09

## 2019-09-20 RX ADMIN — FUROSEMIDE 40 MG: 10 INJECTION, SOLUTION INTRAMUSCULAR; INTRAVENOUS at 05:09

## 2019-09-20 RX ADMIN — HUMAN ALBUMIN MICROSPHERES AND PERFLUTREN 0.66 MG: 10; .22 INJECTION, SOLUTION INTRAVENOUS at 01:09

## 2019-09-20 RX ADMIN — RAMELTEON 8 MG: 8 TABLET ORAL at 11:09

## 2019-09-20 RX ADMIN — ACETAMINOPHEN 650 MG: 325 TABLET ORAL at 09:09

## 2019-09-20 RX ADMIN — LEVOTHYROXINE SODIUM 88 MCG: 88 TABLET ORAL at 01:09

## 2019-09-20 RX ADMIN — SODIUM CHLORIDE 1000 ML: 0.9 INJECTION, SOLUTION INTRAVENOUS at 07:09

## 2019-09-20 RX ADMIN — FUROSEMIDE 40 MG: 10 INJECTION, SOLUTION INTRAMUSCULAR; INTRAVENOUS at 08:09

## 2019-09-20 RX ADMIN — CILOSTAZOL 100 MG: 50 TABLET ORAL at 01:09

## 2019-09-20 RX ADMIN — HEPARIN SODIUM 15 UNITS/KG/HR: 10000 INJECTION, SOLUTION INTRAVENOUS at 03:09

## 2019-09-20 RX ADMIN — DILTIAZEM HYDROCHLORIDE 10 MG: 5 INJECTION INTRAVENOUS at 07:09

## 2019-09-20 RX ADMIN — ASPIRIN 325 MG ORAL TABLET 325 MG: 325 PILL ORAL at 07:09

## 2019-09-20 NOTE — HPI
89 y/o female with PMH of severe AS, non-rheumatic MR, rEF, HTN, hypothyroidism, malignant melanoma, CAD and ICM who presents with CP. The patient lives with her 96 y/o sister and two nephews. The family noticed that she was taking longer in the bathroom this morning. They went inside the bathroom and noticed that she was pale and diaphoretic. She reported CP, so she was given a SL NTG with an effect that is unknown. The patient has baseline dementia according to family. They called EMS who noted a SBP in the 60s. She was brought to [facility] ER and given 2L NS,  mg, and IV diltiazem 10 mg. The IV diltiazem was given because she was in AF with RVR. In the ED, Hgb 10.4, BNP 2426, troponin 1.3, and CXR c/w pulmonary edema. Her EKG is c/w inferior non-specific ST changes. In August, her ECHO demonstrated an EF of 30%, LAD territory WMA c/w prior large infarction, VINEET 0.8, peak velocity 3.9, mean gradient 32, and moderate to severe MR. Her nephew is POA and states that she is DNR/DNI. Her nephews also state that they are weary of interventional procedures given her advanced directive.

## 2019-09-20 NOTE — PLAN OF CARE
Problem: Adult Inpatient Plan of Care  Goal: Plan of Care Review  Outcome: Ongoing (interventions implemented as appropriate)  Plan of care discussed with patient and extended family. Patient is free of fall or injury. Denies CP, SOB, or pain. Heparin drip initiated and adjusted. VS WNL.  Plan to continue hospice discussion. All questions addressed; will continue to monitor.

## 2019-09-20 NOTE — ED NOTES
Patient resting in stretcher and is in NAD at this time. VSS, respirations even and unlabored. Pt and nephew updated on plan of care. Bed low and locked with side rails up x2, call bell in pt reach.  Will continue to monitor.

## 2019-09-20 NOTE — ASSESSMENT & PLAN NOTE
- secondary to ischemic CM  - supportive care as tolerated  - diurese Echo with CVP 8 and CXR with pulm edema; gently  - will resume GDMT as soon as HDS

## 2019-09-20 NOTE — PLAN OF CARE
Mwt with pt. To complete assessment. Pt. has history of dementia. Nephew and numerous family members at bedside that provided information for assessment. Pt. lives with 97 year old sister. Family assist in care for both. Ambulates with RW in home. Able to perform some ADL'S. Cardiac work up in progress for a-fib and chest pain. SW/CM will follow and determine d/c needs.     Extended Emergency Contact Information  Primary Emergency Contact: Patricia Rodriguez  Address: [address] [place] [address] of [facility]  Mobile Phone: [phone]  Relation: Daughter  Secondary Emergency Contact: Raimundo amaral  Mobile Phone: [phone]  Relation: Relative  Preferred language: English   needed? No       [employer] [facility][id number] [address] On license of [facility] [address] [address]  Phone: [phone] Fax: [phone]      Dolly Grijalva MD        09/20/19 1412   Discharge Assessment   Assessment Type Discharge Planning Assessment   Confirmed/corrected address and phone number on facesheet? Yes   Assessment information obtained from? Caregiver  (nephew provided information for assessment. )   Expected Length of Stay (days) 3   Communicated expected length of stay with patient/caregiver yes   Prior to hospitilization cognitive status:   (ha history of dementia)   Prior to hospitalization functional status: Assistive Equipment   Current cognitive status:   (hx. of dementia)   Current Functional Status: Assistive Equipment;Needs Assistance   Facility Arrived From: home   Lives With sibling(s)   Able to Return to Prior Arrangements yes   Is patient able to care for self after discharge? Yes  (with assistance from family)   Who are your caregiver(s) and their phone number(s)? see emergency contact information. Lives with 97 yesr old sister   Patient's perception of discharge disposition home health   Readmission Within the Last 30 Days  no previous admission in last 30 days   Patient currently being followed by outpatient case management? No   Patient currently receives any other outside agency services? No   Equipment Currently Used at Home bedside commode;walker, rolling;wheelchair   Do you have any problems affording any of your prescribed medications? No   Is the patient taking medications as prescribed? yes   Does the patient have transportation home? Yes   Transportation Anticipated family or friend will provide   Does the patient receive services at the [facility]? No   Discharge Plan A Home;Home Health   Discharge Plan B Home;Home with family   DME Needed Upon Discharge  none   Patient/Family in Agreement with Plan yes

## 2019-09-20 NOTE — NURSING TRANSFER
/  Nursing Tr/ansfer Note      9/20/2019     Transfer From: ed    Transfer via stretcher    Transfer with cardiac monitoring    Transported by TRANSPORT     Medicines sent: no    Chart send with patient: Yes    Notified: family    Patient reassessed at:9/20/19 1215    Upon arrival to floor: cardiac monitor applied, patient oriented to room, call bell in reach and bed in lowest position

## 2019-09-20 NOTE — ASSESSMENT & PLAN NOTE
89-year-old female with frailty/debility, severe aortic stenosis, CHF, presenting in shock likely related to acute MI    Decision-making:   -Pt does not have decision-making capacity  -Pt has appointed a HCPOA: Patricia Rodriguez  (daughter) [phone]    Advance care planning:  -The patient has previously engaged in advance care planning  -Living will and HCPOA reviewed, both scanned into Epic  -living will:  Patient would want life-sustaining treatment if doctors think that would help but she would want it.  It would not want to have it of doctors do not think it would help    Estimated prognosis:   -Time and potential for recovery:  Family describes the patient has had a significant decline in her overall health and function in the past month.  They say she has lost 7 lb in last month, has been sleeping all of the time, eating much less.  Given her age and multiple comorbidities it seems that she was showing multiple signs that she was approaching the end of her life and now with this new acute and severe illness, I suspect that she has a prognosis of weeks to months.    Goals of care:  Disease understanding:  -Patient/NOK demonstrate good understanding of the patient's current medical condition    Discussion:  -please see above but family does report that patient has shown multiple signs of being at the end of life in the last month.  Weight loss, sleeping all the time, anorexia.  -they are still hopeful that she will pull through this acute illness described the interventions that she will be receiving including intravenous heparin while hospitalized.  -that being said, they are realistic that given her age and her multiple comorbidities she could take a turn for the worse at any time.  -they are clear about not wanting any escalation to an invasive strategy should she worsen.  -she was already made DNR DNI prior to my assessment and the family confirm this  -we talked about what next steps might look like depending  on how the patient does.  I provided education about hospice care, including the services that would be included in the places where the patient could receive hospice care.  -we all agreed that the patient should be given time limited trial of her current medical therapy and then revisit whether an enrollment in hospice makes the most sense on Monday    Summary/Recommendation:  -Most important goals at this time:  improvement in condition but with limits to invasive therapies  -Most appropriate disposition:  Continue current level of care but no escalation plan  -Code status: DNR

## 2019-09-20 NOTE — ED PROVIDER NOTES
Encounter Date: 9/20/2019       History     Chief Complaint   Patient presents with    Chest Pain     Patient woke up with chest pain and coldness. Family gave patient 1 nitro prior to EMS arrival. Patient has history of dementia.      Time of initial exam: 06:49     The patient is a 89-year-old female who has a past medical history of arthritis, cancer, hyperlipidemia, hyperparathyroidism, hypertension, joint pain, peripheral vascular disease, and thyroid disease presents with chest pain. She lives with her 97-year-old sister and nephew.  Her nephew noticed her go into the bathroom at 5:00 a.m. When she did not come out of the bathroom after prolonged period of time, he became concerned.  He went to check on her and found her sitting on the commode and distressed.  She was diaphoretic and pointing to her chest.  He gave her a sublingual nitroglycerin but is unsure of whether it remained in her mouth or not.  EMS reports that the patient was distressed upon arrival.  She was found to be hypotensive.  IV fluids were initiated during transport.    The history is provided by a relative and the EMS personnel. No  was used.     Review of patient's allergies indicates:  No Known Allergies  Past Medical History:   Diagnosis Date    Arthritis     Cancer     melanoma    Hyperlipidemia     Hyperparathyroidism     Hypertension     Joint pain     Peripheral vascular disease     worse on the right than the left    Thyroid disease     hypothyroidism     Past Surgical History:   Procedure Laterality Date    APPENDECTOMY      Exam under anesthesia N/A 8/17/2018    [facility] at [facility] 2ND FLR    EXCISION-LESION-FACE Right 6/9/2014    Performed by Roberto Carlos Mcdowell [facility] at [facility] OR 2ND FLR    FISTULOTOMY  8/17/2018    [facility] at [facility] OR 2ND FLR    HEMORRHOID SURGERY      PEDICLE FLAP Right 6/9/2014    melolabial flap    WLE right cheek melanoma  Right 6/9/2014     Family History   Problem Relation Age of Onset    Hypertension Mother     Cancer Mother     Hypertension Father     Cancer Father     No Known Problems Maternal Grandmother     Cancer Sister         lung    No Known Problems Maternal Aunt     No Known Problems Maternal Uncle     No Known Problems Paternal Aunt     No Known Problems Paternal Uncle     No Known Problems Maternal Grandfather     No Known Problems Paternal Grandmother     No Known Problems Paternal Grandfather     No Known Problems Sister     Melanoma Neg Hx     Heart attack Neg Hx     Heart disease Neg Hx      Social History     Tobacco Use    Smoking status: Never Smoker    Smokeless tobacco: Never Used    Tobacco comment:  the patient walksabout her house with a walker.she is limited byinstabilityin her left knee. She is Renée's  grandmother.   Substance Use Topics    Alcohol use: No    Drug use: No     Review of Systems   Unable to perform ROS: Acuity of condition       Physical Exam     Initial Vitals [09/20/19 0650]   BP Pulse Resp Temp SpO2   (!) 75/40 93 20 -- 95 %      MAP       --         Physical Exam    Nursing note and vitals reviewed.  Constitutional: She is not diaphoretic. She appears distressed.   HENT:   Head: Normocephalic and atraumatic.   Mouth/Throat: Mucous membranes are dry.   Eyes: Conjunctivae are normal. Pupils are equal, round, and reactive to light. No scleral icterus.   Neck: JVD present.   Cardiovascular: Normal rate. An irregular rhythm present.  Exam reveals no gallop and no friction rub.    Murmur heard.   Systolic murmur is present with a grade of 4/6.  Pulses:       Radial pulses are 2+ on the right side, and 2+ on the left side.        Dorsalis pedis pulses are 1+ on the right side, and 1+ on the left side.   Pulmonary/Chest: Breath sounds normal. No stridor. Tachypnea noted. She has no decreased breath sounds. She has no wheezes. She has no rhonchi. She has no rales.    Abdominal: Soft. She exhibits no distension. There is no tenderness.   Musculoskeletal:   No lower extremity edema. No calf swelling or tenderness bilaterally.  Negative bilateral Homans sign   Skin: Skin is warm and dry. No pallor.         ED Course   Procedures  Labs Reviewed   CBC W/ AUTO DIFFERENTIAL - Abnormal; Notable for the following components:       Result Value    RBC 3.11 (*)     Hemoglobin 10.4 (*)     Hematocrit 33.1 (*)     Mean Corpuscular Volume 106 (*)     Mean Corpuscular Hemoglobin 33.4 (*)     Mean Corpuscular Hemoglobin Conc 31.4 (*)     RDW 15.5 (*)     All other components within normal limits   TROPONIN I - Abnormal; Notable for the following components:    Troponin I 1.301 (*)     All other components within normal limits   B-TYPE NATRIURETIC PEPTIDE - Abnormal; Notable for the following components:    BNP 2,426 (*)     All other components within normal limits   COMPREHENSIVE METABOLIC PANEL   TROPONIN I     EKG Readings: (Independently Interpreted)   9/20/19 06:52  Atrial fibrillation.  Ventricular rate 99 beats per minute. Normal axis.  Normal QRS interval.  Prolonged QT interval.  No ST segment elevation.  No ST segment depression.  Nonspecific inferior and anterolateral T-wave changes.  Atrial fibrillation has replaced sinus bradycardia with arrhythmia that was present on most recent previous EKG performed in August of this year.    9/20/19 07:13  Unchanged from 06:52 study.         Imaging Results          X-Ray Chest AP Portable (Final result)  Result time 09/20/19 07:26:07    Final result by Tree Moore Jr., MD (09/20/19 07:26:07)                 Impression:      Findings most consistent with congestive heart failure.      Electronically signed by: Tree Moore MD  Date:    09/20/2019  Time:    07:26             Narrative:    EXAMINATION:  XR CHEST AP PORTABLE    CLINICAL HISTORY:  Chest Pain;    TECHNIQUE:  Single frontal view of the chest was performed.    COMPARISON:  May  10, 2019.    FINDINGS:  Monitoring leads are in place.  Heart is enlarged.  Mild diffuse increase in the pulmonary vascular and interstitial markings.  No confluent consolidation.  Advanced degenerative changes at the shoulders with remodeling.  Scoliosis.                                 Medical Decision Making:   History:   I obtained history from: someone other than patient.       <> Summary of History: All history obtained from patient's nephew who is at the bedside.  Reports cardiac history and that patient is followed by Dr. Sanderson.   Old Medical Records: I decided to obtain old medical records.  Old Records Summarized: records from clinic visits.       <> Summary of Records: History of aortic stenosis, mitral regurg, dilated cardiomyopathy, coronary artery disease.  Recent echo with EF 30%.  Initial Assessment:   Patient in obvious distress. Patient hypotensive.  Irregular heart rhythm. No tachypnea.  Clear breath sounds.  Differential Diagnosis:   Acute MI, heart failure exacerbation, spontaneous pneumothorax, aortic dissection, arrhythmia.  Independently Interpreted Test(s):   I have ordered and independently interpreted EKG Reading(s) - see prior notes  Clinical Tests:   Lab Tests: Ordered and Reviewed  Radiological Study: Ordered and Reviewed  Medical Tests: Ordered and Reviewed  ED Management:  08:00  Discussed patient's presentation, exam, EKG, chest radiograph, lab results, and ED management with the cardiology fellow.  He will come and evaluate the patient and made recommendations for treatment and disposition.    08:28  The cardiology fellow just evaluated the patient.  Recommends admission to the CCU.  Recommends Lasix 40 mg IV.    09:10  The patient was just evaluated by the CCU team.  They had a discussion with the patient's family.  She is DNR status.  They are recommending admission to hospital medicine for comfort care.              Attending Attestation:         Attending Critical Care:    Critical Care Times:   Direct Patient Care (initial evaluation, reassessments, and time considering the case)................................................................20 minutes.   Additional History from reviewing old medical records or taking additional history from the family, EMS, PCP, etc.......................5 minutes.   Ordering, Reviewing, and Interpreting Diagnostic Studies...............................................................................................................5 minutes.   Documentation..................................................................................................................................................................................5 minutes.   Consultation with other Physicians. .................................................................................................................................................5 minutes.   ==============================================================  · Total Critical Care Time - exclusive of procedural time: 40 minutes.  ==============================================================  Critical care was necessary to treat or prevent imminent or life-threatening deterioration of the following conditions: cardiac arrhythmia, hypotension, nontraumatic shock and congestive heart failure.   Critical care was time spent personally by me on the following activities: obtaining history from patient or relative, examination of patient, review of old charts, ordering lab, x-rays, and/or EKG, development of treatment plan with patient or relative, ordering and performing treatments and interventions, evaluation of patient's response to treatment, discussion with consultants, interpretation of cardiac measurements and re-evaluation of patient's conition.   Critical Care Condition: critical               ED Course as of Sep 20 0804   Fri Sep 20, 2019   0719 Persistent hypotension after initial liter of IV fluids.   Patient is still in distress. Heart rate varies from .  Irregular rhythm on monitor. Her normal heart rhythm is sinus rosalba. Will give IV Cardizem bolus.    [LP]   0756 Chest pain resolved. Patient looks comfortable. . HR . Will start Cardizem infusion.    [LP]      ED Course User Index  [LP] Zhao Singh III, MD     Clinical Impression:       ICD-10-CM ICD-9-CM   1. Acute decompensated heart failure I50.9 428.0   2. Chest pain R07.9 786.50   3. Elevated troponin R74.8 790.6         Disposition:   Disposition: Admitted  Condition: Critical                        Zhao Singh III, MD  09/20/19 0834       Zhao Singh III, MD  09/20/19 0989

## 2019-09-20 NOTE — HPI
89 y/o female with PMH of severe AS, non-rheumatic MR, rEF, HTN, hypothyroidism, malignant melanoma, CAD and ICM who presents with CP. The patient lives with her 96 y/o sister and two nephews. The family noticed that she was taking longer in the bathroom this morning. They went inside the bathroom and noticed that she was pale and diaphoretic. She reported CP, so she was given a SL NTG with an effect that is unknown. The patient has baseline dementia according to family. They called EMS who noted a SBP in the 60s. She was brought to [facility] ER and given 2L NS,  mg, and IV diltiazem 10 mg. The IV diltiazem was given because she was in AF with RVR. In the ED, Hgb 10.4, BNP 2426, troponin 1.3, and CXR c/w pulmonary edema. Her EKG is c/w inferior non-specific ST changes. In August, her ECHO demonstrated an EF of 30%, LAD territory WMA c/w prior large infarction, VINEET 0.8, peak velocity 3.9, mean gradient 32, and moderate to severe MR.

## 2019-09-20 NOTE — ED NOTES
LOC: The patient is awake, alert and aware of environment with an appropriate affect.  Pt with dementia.  APPEARANCE: Patient resting comfortably and in no acute distress, patient is clean and well groomed, patient's clothing is properly fastened.  SKIN: The skin is warm and dry, color consistent with ethnicity, patient has delayed skin turgor and moist mucus membranes, no breakdown or bruising noted.  MUSCULOSKELETAL: Patient moving all extremities spontaneously, no obvious swelling or deformities noted.  RESPIRATORY: Airway is open and patent, respirations are spontaneous, patient has a normal effort and rate, no accessory muscle use noted.  CARDIAC: Patient has a-fib, no periphreal edema noted  ABDOMEN: Soft and non tender to palpation, no distention noted.  NEUROLOGIC:  facial expression is symmetrical, patient moving all extremities spontaneously, normal sensation in all extremities when touched with a finger.

## 2019-09-20 NOTE — HPI
89 y/o F with PMH of severe AS 0.8 MG 32, non-rheumatic Mod/ Severe MR, Phtn 49mmHg, HFrEF 30%, CVP 8 (8/19), HTN, hypothyroidism, malignant melanoma, CAD and ICM who presents with CP, diaphoresis and acute weakness.  She was found to have Afib with RVR upon presentation to the ED, at which time they gave her a dose of IV diltiazem and IV furosemide which converted her into a junctional rhythm of 30 bpm and dropped her pressure into the 60s'.  She was found mentating at her normal but confused / dementia baseline.  She has three providers (two nephews and a niece) who are her decision makers. She has an extensive extended family.  She lives with her 96 y/o sister and two nephews.   The family noticed that she was taking longer in the bathroom this morning. They went inside the bathroom and noticed that she was pale and diaphoretic. She reported CP, so she was given a SL NTG 0.3 in the ambulance. EMS noted SBP in the 60s. She was brought to [facility] ER and given 2L NS,  mg, and IV diltiazem 10 mg for AF with RVR.   In the ED, Hgb 10.4, BNP 2426, troponin 1.3, and CXR c/w pulmonary edema. Her EKG is c/w inferior non-specific ST changes. In August, her ECHO demonstrated an EF of 30%, LAD territory WMA c/w prior large infarction, VINEET 0.8, peak velocity 3.9, mean gradient 32, and moderate to severe MR.     She is a known DNR/ DNI.  She has been steadily declining over the past month, with a 7 lbs weight loss d/t poor appetite/ early satiety.  She has been sleeping more though she is still able to get around using her walker.  She does not want aggressive measures or procedures and the family is agreeble, however they would like her admitted for management of her acute MI.

## 2019-09-20 NOTE — SUBJECTIVE & OBJECTIVE
Past Medical History:   Diagnosis Date    Arthritis     Cancer     melanoma    Hyperlipidemia     Hyperparathyroidism     Hypertension     Joint pain     Peripheral vascular disease     worse on the right than the left    Thyroid disease     hypothyroidism       Past Surgical History:   Procedure Laterality Date    APPENDECTOMY      Exam under anesthesia N/A 8/17/2018    Performed by Jacobo Camilo [facility] at [facility] OR 2ND FLR    EXCISION-LESION-FACE Right 6/9/2014    Performed by Roberto Carlos Mcdowell [facility] at [facility] OR 2ND FLR    FISTULOTOMY  8/17/2018    Performed by [facility] at [facility] OR 2ND FLR    HEMORRHOID SURGERY      PEDICLE FLAP Right 6/9/2014    melolabial flap    WLE right cheek melanoma Right 6/9/2014       Review of patient's allergies indicates:  No Known Allergies    No current facility-administered medications on file prior to encounter.      Current Outpatient Medications on File Prior to Encounter   Medication Sig    ACETAMINOPHEN (TYLENOL ARTHRITIS ORAL) Take 2 tablets by mouth daily as needed.  2 in the morning 2 in the afternoon    aspirin (ECOTRIN) 81 MG EC tablet Take 81 mg by mouth once daily.     cholecalciferol, vitamin D3, 1,000 unit capsule Take 1,000 Units by mouth once daily.     cilostazol (PLETAL) 100 MG Tab Take 1 tablet (100 mg total) by mouth 2 (two) times daily.    docusate sodium (COLACE) 100 MG capsule Take 100 mg by mouth once daily.    levothyroxine (SYNTHROID) 88 MCG tablet Take 1 tablet (88 mcg total) by mouth once daily.    losartan (COZAAR) 25 MG tablet Take 1 tablet (25 mg total) by mouth once daily. (Patient taking differently: Take 25 mg by mouth once daily. Takes half tab 12.5 mg)    lovastatin (MEVACOR) 20 MG tablet TAKE 1 TABLET(20 MG) BY MOUTH EVERY EVENING    memantine (NAMENDA) 5 MG Tab Take 1 tablet (5 mg total) by mouth 2 (two) times daily.    multivitamin-minerals-lutein (CENTRUM SILVER) Tab Take 1 tablet by mouth once daily.      nitroGLYCERIN (NITROSTAT) 0.3 MG SL tablet Place 1 tablet (0.3 mg total) under the tongue every 5 (five) minutes as needed for Chest pain.    olmesartan (BENICAR) 40 MG tablet Take 1 tablet (40 mg total) by mouth once daily.    triamterene-hydrochlorothiazide 37.5-25 mg (MAXZIDE-25) 37.5-25 mg per tablet Take 1/2 tab 5 days a week. (Patient taking differently: Take 1/2 tab 5 days a week. mon -> fri)    alendronate (FOSAMAX) 70 MG tablet TAKE 1 TABLET BY MOUTH ONCE A WEEK    ferrous sulfate 325 (65 FE) MG EC tablet Take 1 tablet (325 mg total) by mouth every other day.    polyethylene glycol 3350 (MIRALAX ORAL) Take by mouth daily as needed. Takes half dose daily, may increase to full dose if needed    [DISCONTINUED] QUEtiapine (SEROQUEL) 25 MG Tab TAKE 1 TABLET(25 MG) BY MOUTH EVERY NIGHT AS NEEDED     Family History     Problem Relation (Age of Onset)    Cancer Mother, Father, Sister    Hypertension Mother, Father    No Known Problems Maternal Grandmother, Maternal Aunt, Maternal Uncle, Paternal Aunt, Paternal Uncle, Maternal Grandfather, Paternal Grandmother, Paternal Grandfather, Sister        Tobacco Use    Smoking status: Never Smoker    Smokeless tobacco: Never Used    Tobacco comment:  the patient walksabout her house with a walker.she is limited byinstabilityin her left knee. She is Renée's  grandmother.   Substance and Sexual Activity    Alcohol use: No    Drug use: No    Sexual activity: Never     Review of Systems   Constitutional: Positive for activity change, appetite change and fatigue. Negative for chills and fever.   HENT: Negative for congestion, rhinorrhea, sinus pressure, sore throat and trouble swallowing.    Eyes: Negative for pain, redness and visual disturbance.   Respiratory: Negative for cough, chest tightness, shortness of breath, wheezing and stridor.    Cardiovascular: Positive for chest pain and leg swelling. Negative for palpitations.   Gastrointestinal: Positive for  constipation. Negative for abdominal distention, abdominal pain, blood in stool, diarrhea, nausea and vomiting.   Endocrine: Negative for cold intolerance and heat intolerance.   Genitourinary: Negative for dysuria, frequency, hematuria and urgency.   Musculoskeletal: Negative for arthralgias, back pain, myalgias and neck pain.   Skin: Negative for color change, pallor and rash.   Allergic/Immunologic: Negative for immunocompromised state.   Neurological: Positive for weakness. Negative for dizziness, tremors, syncope, light-headedness, numbness and headaches.   Hematological: Does not bruise/bleed easily.   Psychiatric/Behavioral: Positive for behavioral problems and confusion. Negative for agitation. The patient is not nervous/anxious.         Dementia       Objective:     Vital Signs (Most Recent):  Temp: 96.5 °F (35.8 °C) (09/20/19 1605)  Pulse: 73 (09/20/19 1605)  Resp: 18 (09/20/19 1605)  BP: 118/78 (09/20/19 1605)  SpO2: 95 % (09/20/19 1605) Vital Signs (24h Range):  Temp:  [96.3 °F (35.7 °C)-98.1 °F (36.7 °C)] 96.5 °F (35.8 °C)  Pulse:  [] 73  Resp:  [18-35] 18  SpO2:  [93 %-100 %] 95 %  BP: ()/(40-95) 118/78     Weight: 47.2 kg (104 lb)  Body mass index is 20.31 kg/m².    Physical Exam   Constitutional: She is oriented to person, place, and time. She appears well-developed and well-nourished. No distress.   HENT:   Head: Normocephalic and atraumatic.   Right Ear: External ear normal.   Left Ear: External ear normal.   Nose: Nose normal.   Mouth/Throat: Oropharynx is clear and moist.   Eyes: Conjunctivae and EOM are normal. No scleral icterus.   Neck: Normal range of motion. Neck supple. Hepatojugular reflux and JVD present. No tracheal deviation present. No thyromegaly present.   Cardiovascular: Normal rate, regular rhythm, normal heart sounds and intact distal pulses. Exam reveals no gallop and no friction rub.   No murmur heard.  Pulmonary/Chest: Effort normal and breath sounds normal. No  respiratory distress. She has no wheezes. She has no rales.   Abdominal: Soft. Bowel sounds are normal. She exhibits no distension and no mass. There is no tenderness. There is no rebound and no guarding.   Musculoskeletal: Normal range of motion. She exhibits edema (bilateral ankle). She exhibits no tenderness.   Neurological: She is alert and oriented to person, place, and time. No cranial nerve deficit or sensory deficit. She exhibits normal muscle tone. Coordination normal.   Skin: Skin is warm and dry. No rash noted. No erythema.   Psychiatric: She has a normal mood and affect. Her behavior is normal. Her speech is tangential. Cognition and memory are impaired. She expresses inappropriate judgment.   [place], dementia/ confused, pleasant       Nursing note and vitals reviewed.        CRANIAL NERVES     CN III, IV, VI   Extraocular motions are normal.        Significant Labs:   A1C:   Recent Labs   Lab 09/20/19  0656 09/20/19  1147   HGBA1C 4.9 5.1     Blood Culture: No results for input(s): LABBLOO in the last 48 hours.  CBC:   Recent Labs   Lab 09/20/19  0656   WBC 7.08   HGB 10.4*   HCT 33.1*        CMP:   Recent Labs   Lab 09/20/19  0656      K 4.1   *   CO2 17*   *   BUN 34*   CREATININE 1.4   CALCIUM 9.8   PROT 6.6   ALBUMIN 3.7   BILITOT 0.4   ALKPHOS 61   AST 13   ALT 7*   ANIONGAP 14   EGFRNONAA 32.0*     Cardiac Markers:   Recent Labs   Lab 09/20/19  0656   BNP 2,426*     Lipid Panel:   Recent Labs   Lab 09/20/19  0656   CHOL 163   HDL 64   LDLCALC 77.6   TRIG 107   CHOLHDL 39.3     Troponin:   Recent Labs   Lab 09/20/19  0656 09/20/19  1319   TROPONINI 1.301* 12.472*     TSH:   Recent Labs   Lab 09/20/19  0656   TSH 2.581     Urine Studies: pending    · Significant Imaging: Echo: I have reviewed all pertinent results/findings within the past 24 hours and my personal findings are:  Moderately decreased left ventricular systolic function. The estimated ejection fraction is 30%.  Local segmental wall motion abnormalities.  · Grade II (moderate) left ventricular diastolic dysfunction consistent with pseudonormalization. Elevated left atrial pressure.  · Eccentric left ventricular hypertrophy.  · Moderate-to-severe aortic valve stenosis. Aortic valve area is 0.65 cm2; peak velocity is 3.3 m/s; mean gradient is 28 mmHg. Low stroke volume of 30cc/m2.  · Severe eccentric, anteriorly directed mitral regurgitation.  · Normal right ventricular systolic function.  · The estimated PA systolic pressure is 77 mm Hg  · Intermediate central venous pressure (8 mm Hg).  · Pulmonary hypertension present.  · Severe left atrial enlargement.  Elevated left atrial pressure.

## 2019-09-20 NOTE — ASSESSMENT & PLAN NOTE
- PMH includes CAD and severe AS/MR  - TAMERA?DS?-VASc = 6 for newly-diagnosed AF; AC if no contrainidication  - trend troponin and ECG  - optimize GDMT as tolerated  - initiate diuresis  - admit to CCU  - discussed with Dr. Milton

## 2019-09-20 NOTE — ED NOTES
Cardiology, Dr. Ruiz remains at bedside, aware of V/S, states no further interventions.   called as per family request.

## 2019-09-20 NOTE — ED NOTES
Cardiology at bedside, discussing options with nephews.  Family reports pt is DNR, comfort measures only.  Pt remains awake and alert.  Will continue to monitor.

## 2019-09-20 NOTE — ED NOTES
Jayda Rendon, an 89 y.o. female presents to the ED for chest pain, diaphoresis.  Per nephew, pt went to the restroom around 5 am. When he checked the pt because she was taking long, he found the patient diaphoretic and very pale. Nephew gave 1 nitro SL. Upon EMS arrival, BP was on 70's and was given fluid. Upon Ed arrival, BP still 70's.    No chief complaint on file.    Review of patient's allergies indicates:  No Known Allergies  Past Medical History:   Diagnosis Date    Arthritis     Cancer     melanoma    Hyperlipidemia     Hyperparathyroidism     Hypertension     Joint pain     Peripheral vascular disease     worse on the right than the left    Thyroid disease     hypothyroidism

## 2019-09-20 NOTE — ED NOTES
CAR Ramon aware pt with bed on 306A, transport present to bring pt to floor.  CAR Fay states pt can transfer to floor and orders initiated there.  Updated report called to CORY Rangel.

## 2019-09-20 NOTE — ED NOTES
Family remains at bedside, updated on pt room status.  Pt awake, talking to family.  Cardiology PA at bedside discussing plan of care.

## 2019-09-20 NOTE — CONSULTS
Palliative Care Acknowledgement of Consult - .date    Consult received. Palliative Care Provider:_ Dr. Baker will touch base with team prior to seeing patient. Full consult to follow.    Thank you for allowing us to be a part of the care of this patient.          Brandee Dockery, NATALY, ACHP-SW

## 2019-09-20 NOTE — HPI
89 y/o female with PMH of severe AS, non-rheumatic MR, rEF, HTN, hypothyroidism, malignant melanoma, CAD and ICM who presents with CP. The patient lives with her 96 y/o sister and two nephews. The family noticed that she was taking longer in the bathroom this morning. They went inside the bathroom and noticed that she was pale and diaphoretic. She reported CP, so she was given a SL NTG with an effect that is unknown. The patient has baseline dementia according to family. They called EMS who noted a SBP in the 60s. She was brought to [facility] ER and given 2L NS,  mg, and IV diltiazem 10 mg. The IV diltiazem was given because she was in AF with RVR. In the ED, Hgb 10.4, BNP 2426, troponin 1.3, and CXR c/w pulmonary edema. Her EKG is c/w inferior non-specific ST changes. In August, her ECHO demonstrated an EF of 30%, LAD territory WMA c/w prior large infarction, VINEET 0.8, peak velocity 3.9, mean gradient 32, and moderate to severe MR. Her nephew is POA and states that she is DNR/DNI. Her nephews also state that they are weary of interventional procedures given her advanced directive.

## 2019-09-20 NOTE — H&P
[facility] Medicine  History & Physical    Patient Name: Jayda eRndon  MRN: [id number]  Admission Date: 9/20/2019  Attending Physician: Ayleen Lucia MD   Primary Care Provider: Dolly Grijalva MD    [facility] Medicine Team: [facility] DANIEL Fay NP     Patient information was obtained from patient, relative(s), caregiver / friend, past medical records and ER records.     Subjective:     Principal Problem:NSTEMI (non-ST elevated myocardial infarction)    Chief Complaint:   Chief Complaint   Patient presents with    Chest Pain     Patient woke up with chest pain and coldness. Family gave patient 1 nitro prior to EMS arrival. Patient has history of dementia.         HPI: 89 y/o F with PMH of severe AS 0.8 MG 32, non-rheumatic Mod/ Severe MR, Phtn 49mmHg, HFrEF 30%, CVP 8 (8/19), HTN, hypothyroidism, malignant melanoma, CAD and ICM who presents with CP, diaphoresis and acute weakness.  She was found to have Afib with RVR upon presentation to the ED, at which time they gave her a dose of IV diltiazem and IV furosemide which converted her into a junctional rhythm of 30 bpm and dropped her pressure into the 60s'.  She was found mentating at her normal but confused / dementia baseline.  She has three providers (two nephews and a niece) who are her decision makers. She has an extensive extended family.  She lives with her 98 y/o sister and two nephews.   The family noticed that she was taking longer in the bathroom this morning. They went inside the bathroom and noticed that she was pale and diaphoretic. She reported CP, so she was given a SL NTG 0.3 in the ambulance. EMS noted SBP in the 60s. She was brought to [facility] ER and given 2L NS,  mg, and IV diltiazem 10 mg for AF with RVR.   In the ED, Hgb 10.4, BNP 2426, troponin 1.3, and CXR c/w pulmonary edema. Her EKG is c/w inferior non-specific ST changes. In August, her ECHO demonstrated an EF of 30%, LAD territory WMA c/w prior  large infarction, VINEET 0.8, peak velocity 3.9, mean gradient 32, and moderate to severe MR.     She is a known DNR/ DNI.  She has been steadily declining over the past month, with a 7 lbs weight loss d/t poor appetite/ early satiety.  She has been sleeping more though she is still able to get around using her walker.  She does not want aggressive measures or procedures and the family is agreeble, however they would like her admitted for management of her acute MI.     Past Medical History:   Diagnosis Date    Arthritis     Cancer     melanoma    Hyperlipidemia     Hyperparathyroidism     Hypertension     Joint pain     Peripheral vascular disease     worse on the right than the left    Thyroid disease     hypothyroidism       Past Surgical History:   Procedure Laterality Date    APPENDECTOMY      Exam under anesthesia N/A 8/17/2018    [facility]    EXCISION-LESION-FACE Right 6/9/2014    Performed by [facility]owell [facility] at [facility]    FISTULOTOMY  8/17/2018    [facility] by [facility] at [facility]    HEMORRHOID SURGERY      PEDICLE FLAP Right 6/9/2014    melolabial flap    WLE right cheek melanoma Right 6/9/2014       Review of patient's allergies indicates:  No Known Allergies    No current facility-administered medications on file prior to encounter.      Current Outpatient Medications on File Prior to Encounter   Medication Sig    ACETAMINOPHEN (TYLENOL ARTHRITIS ORAL) Take 2 tablets by mouth daily as needed.  2 in the morning 2 in the afternoon    aspirin (ECOTRIN) 81 MG EC tablet Take 81 mg by mouth once daily.     cholecalciferol, vitamin D3, 1,000 unit capsule Take 1,000 Units by mouth once daily.     cilostazol (PLETAL) 100 MG Tab Take 1 tablet (100 mg total) by mouth 2 (two) times daily.    docusate sodium (COLACE) 100 MG capsule Take 100 mg by mouth once daily.    levothyroxine (SYNTHROID) 88 MCG tablet Take 1 tablet (88  mcg total) by mouth once daily.    losartan (COZAAR) 25 MG tablet Take 1 tablet (25 mg total) by mouth once daily. (Patient taking differently: Take 25 mg by mouth once daily. Takes half tab 12.5 mg)    lovastatin (MEVACOR) 20 MG tablet TAKE 1 TABLET(20 MG) BY MOUTH EVERY EVENING    memantine (NAMENDA) 5 MG Tab Take 1 tablet (5 mg total) by mouth 2 (two) times daily.    multivitamin-minerals-lutein (CENTRUM SILVER) Tab Take 1 tablet by mouth once daily.     nitroGLYCERIN (NITROSTAT) 0.3 MG SL tablet Place 1 tablet (0.3 mg total) under the tongue every 5 (five) minutes as needed for Chest pain.    olmesartan (BENICAR) 40 MG tablet Take 1 tablet (40 mg total) by mouth once daily.    triamterene-hydrochlorothiazide 37.5-25 mg (MAXZIDE-25) 37.5-25 mg per tablet Take 1/2 tab 5 days a week. (Patient taking differently: Take 1/2 tab 5 days a week. mon -> fri)    alendronate (FOSAMAX) 70 MG tablet TAKE 1 TABLET BY MOUTH ONCE A WEEK    ferrous sulfate 325 (65 FE) MG EC tablet Take 1 tablet (325 mg total) by mouth every other day.    polyethylene glycol 3350 (MIRALAX ORAL) Take by mouth daily as needed. Takes half dose daily, may increase to full dose if needed    [DISCONTINUED] QUEtiapine (SEROQUEL) 25 MG Tab TAKE 1 TABLET(25 MG) BY MOUTH EVERY NIGHT AS NEEDED     Family History     Problem Relation (Age of Onset)    Cancer Mother, Father, Sister    Hypertension Mother, Father    No Known Problems Maternal Grandmother, Maternal Aunt, Maternal Uncle, Paternal Aunt, Paternal Uncle, Maternal Grandfather, Paternal Grandmother, Paternal Grandfather, Sister        Tobacco Use    Smoking status: Never Smoker    Smokeless tobacco: Never Used    Tobacco comment:  the patient walksabout her house with a walker.she is limited byinstabilityin her left knee. She is Renée's  grandmother.   Substance and Sexual Activity    Alcohol use: No    Drug use: No    Sexual activity: Never     Review of Systems   Constitutional:  Positive for activity change, appetite change and fatigue. Negative for chills and fever.   HENT: Negative for congestion, rhinorrhea, sinus pressure, sore throat and trouble swallowing.    Eyes: Negative for pain, redness and visual disturbance.   Respiratory: Negative for cough, chest tightness, shortness of breath, wheezing and stridor.    Cardiovascular: Positive for chest pain and leg swelling. Negative for palpitations.   Gastrointestinal: Positive for constipation. Negative for abdominal distention, abdominal pain, blood in stool, diarrhea, nausea and vomiting.   Endocrine: Negative for cold intolerance and heat intolerance.   Genitourinary: Negative for dysuria, frequency, hematuria and urgency.   Musculoskeletal: Negative for arthralgias, back pain, myalgias and neck pain.   Skin: Negative for color change, pallor and rash.   Allergic/Immunologic: Negative for immunocompromised state.   Neurological: Positive for weakness. Negative for dizziness, tremors, syncope, light-headedness, numbness and headaches.   Hematological: Does not bruise/bleed easily.   Psychiatric/Behavioral: Positive for behavioral problems and confusion. Negative for agitation. The patient is not nervous/anxious.         Dementia       Objective:     Vital Signs (Most Recent):  Temp: 96.5 °F (35.8 °C) (09/20/19 1605)  Pulse: 73 (09/20/19 1605)  Resp: 18 (09/20/19 1605)  BP: 118/78 (09/20/19 1605)  SpO2: 95 % (09/20/19 1605) Vital Signs (24h Range):  Temp:  [96.3 °F (35.7 °C)-98.1 °F (36.7 °C)] 96.5 °F (35.8 °C)  Pulse:  [] 73  Resp:  [18-35] 18  SpO2:  [93 %-100 %] 95 %  BP: ()/(40-95) 118/78     Weight: 47.2 kg (104 lb)  Body mass index is 20.31 kg/m².    Physical Exam   Constitutional: She is oriented to person, place, and time. She appears well-developed and well-nourished. No distress.   HENT:   Head: Normocephalic and atraumatic.   Right Ear: External ear normal.   Left Ear: External ear normal.   Nose: Nose normal.    Mouth/Throat: Oropharynx is clear and moist.   Eyes: Conjunctivae and EOM are normal. No scleral icterus.   Neck: Normal range of motion. Neck supple. Hepatojugular reflux and JVD present. No tracheal deviation present. No thyromegaly present.   Cardiovascular: Normal rate, regular rhythm, normal heart sounds and intact distal pulses. Exam reveals no gallop and no friction rub.   No murmur heard.  Pulmonary/Chest: Effort normal and breath sounds normal. No respiratory distress. She has no wheezes. She has no rales.   Abdominal: Soft. Bowel sounds are normal. She exhibits no distension and no mass. There is no tenderness. There is no rebound and no guarding.   Musculoskeletal: Normal range of motion. She exhibits edema (bilateral ankle). She exhibits no tenderness.   Neurological: She is alert and oriented to person, place, and time. No cranial nerve deficit or sensory deficit. She exhibits normal muscle tone. Coordination normal.   Skin: Skin is warm and dry. No rash noted. No erythema.   Psychiatric: She has a normal mood and affect. Her behavior is normal. Her speech is tangential. Cognition and memory are impaired. She expresses inappropriate judgment.   [place], dementia/ confused, pleasant       Nursing note and vitals reviewed.        CRANIAL NERVES     CN III, IV, VI   Extraocular motions are normal.        Significant Labs:   A1C:   Recent Labs   Lab 09/20/19  0656 09/20/19  1147   HGBA1C 4.9 5.1     Blood Culture: No results for input(s): LABBLOO in the last 48 hours.  CBC:   Recent Labs   Lab 09/20/19  0656   WBC 7.08   HGB 10.4*   HCT 33.1*        CMP:   Recent Labs   Lab 09/20/19  0656      K 4.1   *   CO2 17*   *   BUN 34*   CREATININE 1.4   CALCIUM 9.8   PROT 6.6   ALBUMIN 3.7   BILITOT 0.4   ALKPHOS 61   AST 13   ALT 7*   ANIONGAP 14   EGFRNONAA 32.0*     Cardiac Markers:   Recent Labs   Lab 09/20/19  0656   BNP 2,426*     Lipid Panel:   Recent Labs   Lab 09/20/19  0656   CHOL  163   HDL 64   LDLCALC 77.6   TRIG 107   CHOLHDL 39.3     Troponin:   Recent Labs   Lab 09/20/19  0656 09/20/19  1319   TROPONINI 1.301* 12.472*     TSH:   Recent Labs   Lab 09/20/19  0656   TSH 2.581     Urine Studies: pending    · Significant Imaging: Echo: I have reviewed all pertinent results/findings within the past 24 hours and my personal findings are:  Moderately decreased left ventricular systolic function. The estimated ejection fraction is 30%. Local segmental wall motion abnormalities.  · Grade II (moderate) left ventricular diastolic dysfunction consistent with pseudonormalization. Elevated left atrial pressure.  · Eccentric left ventricular hypertrophy.  · Moderate-to-severe aortic valve stenosis. Aortic valve area is 0.65 cm2; peak velocity is 3.3 m/s; mean gradient is 28 mmHg. Low stroke volume of 30cc/m2.  · Severe eccentric, anteriorly directed mitral regurgitation.  · Normal right ventricular systolic function.  · The estimated PA systolic pressure is 77 mm Hg  · Intermediate central venous pressure (8 mm Hg).  · Pulmonary hypertension present.  · Severe left atrial enlargement.  Elevated left atrial pressure.    Assessment/Plan:     * NSTEMI (non-ST elevated myocardial infarction)  - troponin has trended 1.3 to 12  - ACS protocol initiated for medical management, heparin gtt, ASA, plavix loaded, lipid panel normal so will continue with home mevacor.   - Supportive care, no aggressive measures per family  - DNR/ DNI, considering hospice upon discharge  - NO antihypertensives for now as her b/p just finally came up after being in the 60's with a junctional rhythm.  Will monitor o/n and will add low dose BB/ ARB as tolerated   - PAP higher and BNP elevated so will give an extra dose of lasix now that she has stabilized post cardizem dosing.       Paroxysmal atrial fibrillation  - given dose of diltiazem 10mg IVP with resultant conversion to junctional bradycardia 30's and hypotension in the 60's.  -  now back in SR to 60's   -no long term use of AC as she's high risk fall  - family considering DCCV if she goes back into afib with RVR with knowledge she would need sedation and 30 days of AC post procedure      Palliative care encounter  - discussing hospice   - DNR/ DNI paperwork on chart      Acute on chronic systolic heart failure  - see above       Acute decompensated heart failure  - secondary to ischemic CM  - supportive care as tolerated  - diurese Echo with CVP 8 and CXR with pulm edema; gently  - will resume GDMT as soon as HDS       Mitral regurgitation  - moderate/ severe, needs afterload reduction      Nonrheumatic aortic valve stenosis  - VINEET 0.6 and MG 28, EF 30%      Hypothyroidism  - TSH wnl        Hyperlipidemia  - lipid panel adequate on mevacor        VTE Risk Mitigation (From admission, onward)        Ordered     heparin 25,000 units in dextrose 5% 250 mL (100 units/mL) infusion LOW INTENSITY nomogram - [facility]  Continuous      09/20/19 1121     heparin 25,000 units in dextrose 5% (100 units/ml) IV bolus from bag - ADDITIONAL PRN BOLUS - 60 units/kg (max bolus 4000 units)  As needed (PRN)      09/20/19 1121     heparin 25,000 units in dextrose 5% (100 units/ml) IV bolus from bag - ADDITIONAL PRN BOLUS - 30 units/kg (max bolus 4000 units)  As needed (PRN)      09/20/19 1121     IP VTE HIGH RISK PATIENT  Once      09/20/19 1121     Reason for no Mechanical VTE Prophylaxis  Once      09/20/19 1121             Mckenzie Fay NP  Department of Hospital Medicine   [facility]

## 2019-09-20 NOTE — SUBJECTIVE & OBJECTIVE
Past Medical History:   Diagnosis Date    Arthritis     Cancer     melanoma    Hyperlipidemia     Hyperparathyroidism     Hypertension     Joint pain     Peripheral vascular disease     worse on the right than the left    Thyroid disease     hypothyroidism       Past Surgical History:   Procedure Laterality Date    APPENDECTOMY      Exam under anesthesia N/A 8/17/2018    [facility]    EXCISION-LESION-FACE Right 6/9/2014    [facility]    FISTULOTOMY  8/17/2018    Performed by [facility] at [facility] OR [address] [place] [address]    HEMORRHOID SURGERY      PEDICLE FLAP Right 6/9/2014    melolabial flap    WLE right cheek melanoma Right 6/9/2014       Review of patient's allergies indicates:  No Known Allergies    Medications:  Continuous Infusions:   heparin (porcine) in D5W 12 Units/kg/hr (09/20/19 1319)     Scheduled Meds:   acetaminophen  650 mg Oral Q12H    [START ON 9/21/2019] aspirin  81 mg Oral Daily    cilostazol  100 mg Oral BID    [START ON 9/21/2019] clopidogrel  75 mg Oral Daily    docusate sodium  100 mg Oral BID    ferrous sulfate  325 mg Oral Every other day    levothyroxine  88 mcg Oral Daily    lovastatin  20 mg Oral QHS    memantine  5 mg Oral BID    perflutren protein-a microsphr  3 mL Intravenous 1 time in Clinic/HOD    vitamin D  1,000 Units Oral Daily     PRN Meds:Dextrose 10% Bolus, Dextrose 10% Bolus, glucagon (human recombinant), glucose, glucose, heparin (PORCINE), heparin (PORCINE), nitroGLYCERIN, polyethylene glycol, sodium chloride 0.9%    Family History     Problem Relation (Age of Onset)    Cancer Mother, Father, Sister    Hypertension Mother, Father    No Known Problems Maternal Grandmother, Maternal Aunt, Maternal Uncle, Paternal Aunt, Paternal Uncle, Maternal Grandfather, Paternal Grandmother, Paternal Grandfather, Sister        Tobacco Use    Smoking status: Never Smoker     Smokeless tobacco: Never Used    Tobacco comment:  the patient walksabout her house with a walker.she is limited byinstabilityin her left knee. She is Renée's  grandmother.   Substance and Sexual Activity    Alcohol use: No    Drug use: No    Sexual activity: Never       Review of Systems   Unable to perform ROS: Dementia     Objective:     Vital Signs (Most Recent):  Temp: 98.1 °F (36.7 °C) (09/20/19 1316)  Pulse: (!) 59 (09/20/19 1316)  Resp: (!) 22 (09/20/19 1316)  BP: (!) 141/64 (09/20/19 1316)  SpO2: (!) 93 % (09/20/19 1316) Vital Signs (24h Range):  Temp:  [96.3 °F (35.7 °C)-98.1 °F (36.7 °C)] 98.1 °F (36.7 °C)  Pulse:  [] 59  Resp:  [18-35] 22  SpO2:  [93 %-100 %] 93 %  BP: ()/(40-95) 141/64     Weight: 47.3 kg (104 lb 4.4 oz)  Body mass index is 21.06 kg/m².    Review of Symptoms    Performance Status: 30    ECOG Performance Status Grade: 4 - Completely disabled    Physical Exam   Constitutional: She appears listless. She is cooperative.  Non-toxic appearance. She has a sickly appearance.   HENT:   Mouth/Throat: Oropharynx is clear and moist.   Eyes: Conjunctivae are normal.   Neck: Neck supple.   Pulmonary/Chest: No respiratory distress.   Abdominal: She exhibits no distension.   Musculoskeletal: She exhibits no edema.   Neurological: She appears listless.   Patient is somewhat drowsy but able to answer simple questions and follow commands   Skin: Skin is warm and dry.   Psychiatric: She has a normal mood and affect.   Nursing note and vitals reviewed.      CBC:   Recent Labs   Lab 09/20/19  0656   WBC 7.08   HGB 10.4*   HCT 33.1*   *        BMP:  Recent Labs   Lab 09/20/19  0656   *      K 4.1   *   CO2 17*   BUN 34*   CREATININE 1.4   CALCIUM 9.8     LFT:  Lab Results   Component Value Date    AST 13 09/20/2019    ALKPHOS 61 09/20/2019    BILITOT 0.4 09/20/2019     Albumin:   Albumin   Date Value Ref Range Status   09/20/2019 3.7 3.5 - 5.2 g/dL Final      Protein:   Total Protein   Date Value Ref Range Status   09/20/2019 6.6 6.0 - 8.4 g/dL Final     Lactic acid:   No results found for: LACTATE      Advance Care Planning   Advanced Directives::  Living Will: Yes. Copy on chart: Yes  [place]: No  Do Not Resuscitate Status: Yes  Medical Power of : Yes. Agent's Name: Patricia Rodriguez. Agent's Contact Number:     Decision-Making Capacity: Family answered questions, Patient unable to communicate due to disease severity/cognitive impairment       Living Arrangements: Lives with family

## 2019-09-20 NOTE — CONSULTS
[facility]  Palliative Medicine  Consult Note    Patient Name: Jayda Rendon  MRN: [id number]  Admission Date: 9/20/2019  Hospital Length of Stay: 0 days  Code Status: DNR   Attending Provider: Ayleen Lucia MD  Consulting Provider: Beronica Baker MD  Primary Care Physician: Dolly Grijalva MD  Principal Problem:NSTEMI (non-ST elevated myocardial infarction)    Patient information was obtained from relative(s).      Consults  Assessment/Plan:     Palliative care encounter  89-year-old female with frailty/debility, severe aortic stenosis, CHF, presenting in shock likely related to acute MI    Decision-making:   -Pt does not have decision-making capacity  -Pt has appointed a HCPOA: Patricia Rodriguez  (daughter) [phone]    Advance care planning:  -The patient has previously engaged in advance care planning  -Living will and HCPOA reviewed, both scanned into Epic  -living will:  Patient would want life-sustaining treatment if doctors think that would help but she would want it.  It would not want to have it of doctors do not think it would help    Estimated prognosis:   -Time and potential for recovery:  Family describes the patient has had a significant decline in her overall health and function in the past month.  They say she has lost 7 lb in last month, has been sleeping all of the time, eating much less.  Given her age and multiple comorbidities it seems that she was showing multiple signs that she was approaching the end of her life and now with this new acute and severe illness, I suspect that she has a prognosis of weeks to months.    Goals of care:  Disease understanding:  -Patient/NOK demonstrate good understanding of the patient's current medical condition    Discussion:  -please see above but family does report that patient has shown multiple signs of being at the end of life in the last month.  Weight loss, sleeping all the time, anorexia.  -they are still hopeful that she will pull through  this acute illness described the interventions that she will be receiving including intravenous heparin while hospitalized.  -that being said, they are realistic that given her age and her multiple comorbidities she could take a turn for the worse at any time.  -they are clear about not wanting any escalation to an invasive strategy should she worsen.  -she was already made DNR DNI prior to my assessment and the family confirm this  -we talked about what next steps might look like depending on how the patient does.  I provided education about hospice care, including the services that would be included in the places where the patient could receive hospice care.  -we all agreed that the patient should be given time limited trial of her current medical therapy and then revisit whether an enrollment in hospice makes the most sense on Monday    Summary/Recommendation:  -Most important goals at this time:  improvement in condition but with limits to invasive therapies  -Most appropriate disposition:  Continue current level of care but no escalation plan  -Code status: DNR          Thank you for your consult. I will follow-up with patient. Please contact us if you have any additional questions.    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 The patient lives with her 96 y/o sister and two nephews. The family noticed that she was taking longer in the bathroom this morning. They went inside the bathroom and noticed that she was pale and diaphoretic. She reported CP, so she was given a SL NTG with an effect that is unknown. The patient has baseline dementia according to family. They called EMS who noted a SBP in the 60s. She was brought to [facility] and given 2L NS,  mg, and IV diltiazem 10 mg. The IV diltiazem was given because she was in AF with RVR. In the ED, Hgb 10.4, BNP 2426, troponin 1.3, and CXR c/w  pulmonary edema. Her EKG is c/w inferior non-specific ST changes. In August, her ECHO demonstrated an EF of 30%, LAD territory WMA c/w prior large infarction, VINEET 0.8, peak velocity 3.9, mean gradient 32, and moderate to severe MR. Her nephew is POA and states that she is DNR/DNI. Her nephews also state that they are weary of interventional procedures given her advanced directive.      Hospital Course:  No notes on file        Past Medical History:   Diagnosis Date    Arthritis     Cancer     melanoma    Hyperlipidemia     Hyperparathyroidism     Hypertension     Joint pain     Peripheral vascular disease     worse on the right than the left    Thyroid disease     hypothyroidism       Past Surgical History:   Procedure Laterality Date    APPENDECTOMY      Exam under anesthesia N/A 8/17/2018    [facility]formed [facility] 2ND FLR    EXCISION-LESION-FACE Right 6/9/2014    Performed [facility] at [facility] 2ND FLR    FISTULOTOMY  8/17/2018    [facility] at [facility] OR 2ND FLR    HEMORRHOID SURGERY      PEDICLE FLAP Right 6/9/2014    melolabial flap    WLE right cheek melanoma Right 6/9/2014       Review of patient's allergies indicates:  No Known Allergies    Medications:  Continuous Infusions:   heparin (porcine) in D5W 12 Units/kg/hr (09/20/19 1319)     Scheduled Meds:   acetaminophen  650 mg Oral Q12H    [START ON 9/21/2019] aspirin  81 mg Oral Daily    cilostazol  100 mg Oral BID    [START ON 9/21/2019] clopidogrel  75 mg Oral Daily    docusate sodium  100 mg Oral BID    ferrous sulfate  325 mg Oral Every other day    levothyroxine  88 mcg Oral Daily    lovastatin  20 mg Oral QHS    memantine  5 mg Oral BID    perflutren protein-a microsphr  3 mL Intravenous 1 time in Clinic/HOD    vitamin D  1,000 Units Oral Daily     PRN Meds:Dextrose 10% Bolus, Dextrose 10% Bolus, glucagon (human recombinant), glucose, glucose, heparin (PORCINE), heparin  (PORCINE), nitroGLYCERIN, polyethylene glycol, sodium chloride 0.9%    Family History     Problem Relation (Age of Onset)    Cancer Mother, Father, Sister    Hypertension Mother, Father    No Known Problems Maternal Grandmother, Maternal Aunt, Maternal Uncle, Paternal Aunt, Paternal Uncle, Maternal Grandfather, Paternal Grandmother, Paternal Grandfather, Sister        Tobacco Use    Smoking status: Never Smoker    Smokeless tobacco: Never Used    Tobacco comment:  the patient walksabout her house with a walker.she is limited byinstabilityin her left knee. She is Renée's  grandmother.   Substance and Sexual Activity    Alcohol use: No    Drug use: No    Sexual activity: Never       Review of Systems   Unable to perform ROS: Dementia     Objective:     Vital Signs (Most Recent):  Temp: 98.1 °F (36.7 °C) (09/20/19 1316)  Pulse: (!) 59 (09/20/19 1316)  Resp: (!) 22 (09/20/19 1316)  BP: (!) 141/64 (09/20/19 1316)  SpO2: (!) 93 % (09/20/19 1316) Vital Signs (24h Range):  Temp:  [96.3 °F (35.7 °C)-98.1 °F (36.7 °C)] 98.1 °F (36.7 °C)  Pulse:  [] 59  Resp:  [18-35] 22  SpO2:  [93 %-100 %] 93 %  BP: ()/(40-95) 141/64     Weight: 47.3 kg (104 lb 4.4 oz)  Body mass index is 21.06 kg/m².    Review of Symptoms    Performance Status: 30    ECOG Performance Status Grade: 4 - Completely disabled    Physical Exam   Constitutional: She appears listless. She is cooperative.  Non-toxic appearance. She has a sickly appearance.   HENT:   Mouth/Throat: Oropharynx is clear and moist.   Eyes: Conjunctivae are normal.   Neck: Neck supple.   Pulmonary/Chest: No respiratory distress.   Abdominal: She exhibits no distension.   Musculoskeletal: She exhibits no edema.   Neurological: She appears listless.   Patient is somewhat drowsy but able to answer simple questions and follow commands   Skin: Skin is warm and dry.   Psychiatric: She has a normal mood and affect.   Nursing note and vitals reviewed.      CBC:   Recent Labs    Lab 09/20/19  0656   WBC 7.08   HGB 10.4*   HCT 33.1*   *        BMP:  Recent Labs   Lab 09/20/19  0656   *      K 4.1   *   CO2 17*   BUN 34*   CREATININE 1.4   CALCIUM 9.8     LFT:  Lab Results   Component Value Date    AST 13 09/20/2019    ALKPHOS 61 09/20/2019    BILITOT 0.4 09/20/2019     Albumin:   Albumin   Date Value Ref Range Status   09/20/2019 3.7 3.5 - 5.2 g/dL Final     Protein:   Total Protein   Date Value Ref Range Status   09/20/2019 6.6 6.0 - 8.4 g/dL Final     Lactic acid:   No results found for: LACTATE      Advance Care Planning   Advanced Directives::  Living Will: Yes. Copy on chart: Yes  LaPOST: No  Do Not Resuscitate Status: Yes  Medical Power of : Yes. Agent's Name: Patricia Rodriguez. Agent's Contact Number:     Decision-Making Capacity: Family answered questions, Patient unable to communicate due to disease severity/cognitive impairment       Living Arrangements: Lives with family        > 50% of 70 min visit spent in chart review, face to face discussion of goals of care,  symptom assessment, coordination of care and emotional support.    Beronica Baker MD  Palliative Medicine  [facility]  [phone]

## 2019-09-20 NOTE — ED NOTES
Family at bedside.  Pt awake, talking to family.  Pt remains rosalba to cardiac monitor.  Siderails up x 2.  Will continue to monitor.

## 2019-09-20 NOTE — ASSESSMENT & PLAN NOTE
- given dose of diltiazem 10mg IVP with resultant conversion to junctional bradycardia 30's and hypotension in the 60's.  - now back in SR to 60's   -no long term use of AC as she's high risk fall  - family considering DCCV if she goes back into afib with RVR with knowledge she would need sedation and 30 days of AC post procedure

## 2019-09-20 NOTE — CONSULTS
[facility]  Cardiology  Consult Note    Patient Name: Jayda Rendon  MRN: [id number]  Admission Date: 9/20/2019  Hospital Length of Stay: 0 days  Code Status: DNR   Attending Provider: Zhao Singh III, MD   Consulting Provider: Vinod Boykin MD  Primary Care Physician: Dolly Grijalva MD  Principal Problem:<principal problem not specified>    Patient information was obtained from relative(s), past medical records and ER records.     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 The patient lives with her 96 y/o sister and two nephews. The family noticed that she was taking longer in the bathroom this morning. They went inside the bathroom and noticed that she was pale and diaphoretic. She reported CP, so she was given a SL NTG with an effect that is unknown. The patient has baseline dementia according to family. They called EMS who noted a SBP in the 60s. She was brought to [facility] and given 2L NS,  mg, and IV diltiazem 10 mg. The IV diltiazem was given because she was in AF with RVR. In the ED, Hgb 10.4, BNP 2426, troponin 1.3, and CXR c/w pulmonary edema. Her EKG is c/w inferior non-specific ST changes. In August, her ECHO demonstrated an EF of 30%, LAD territory WMA c/w prior large infarction, VINEET 0.8, peak velocity 3.9, mean gradient 32, and moderate to severe MR. Her nephew is POA and states that she is DNR/DNI. Her nephews also state that they are weary of interventional procedures given her advanced directive.     Past Medical History:   Diagnosis Date    Arthritis     Cancer     melanoma    Hyperlipidemia     Hyperparathyroidism     Hypertension     Joint pain     Peripheral vascular disease     worse on the right than the  left    Thyroid disease     hypothyroidism       Past Surgical History:   Procedure Laterality Date    APPENDECTOMY      Exam under anesthesia N/A 8/17/2018    [facility] 2ND FLR    EXCISION-LESION-FACE Right 6/9/2014    Performed by [facility] at [facility] OR 2ND FLR    FISTULOTOMY  8/17/2018    Per[facility] by [facility] at [facility] OR 2ND FLR    HEMORRHOID SURGERY      PEDICLE FLAP Right 6/9/2014    melolabial flap    WLE right cheek melanoma Right 6/9/2014       Review of patient's allergies indicates:  No Known Allergies    No current facility-administered medications on file prior to encounter.      Current Outpatient Medications on File Prior to Encounter   Medication Sig    ACETAMINOPHEN (TYLENOL ARTHRITIS ORAL) Take 2 tablets by mouth daily as needed.  2 in the morning 2 in the afternoon    alendronate (FOSAMAX) 70 MG tablet TAKE 1 TABLET BY MOUTH ONCE A WEEK    aspirin (ECOTRIN) 81 MG EC tablet Take 81 mg by mouth once daily.     cholecalciferol, vitamin D3, 1,000 unit capsule Take 1,000 Units by mouth once daily.     cilostazol (PLETAL) 100 MG Tab Take 1 tablet (100 mg total) by mouth 2 (two) times daily.    docusate sodium (COLACE) 100 MG capsule Take 100 mg by mouth once daily.    ferrous sulfate 325 (65 FE) MG EC tablet Take 1 tablet (325 mg total) by mouth every other day.    levothyroxine (SYNTHROID) 88 MCG tablet Take 1 tablet (88 mcg total) by mouth once daily.    losartan (COZAAR) 25 MG tablet Take 1 tablet (25 mg total) by mouth once daily.    lovastatin (MEVACOR) 20 MG tablet TAKE 1 TABLET(20 MG) BY MOUTH EVERY EVENING    memantine (NAMENDA) 5 MG Tab Take 1 tablet (5 mg total) by mouth 2 (two) times daily.    multivitamin-minerals-lutein (CENTRUM SILVER) Tab Take 1 tablet by mouth once daily.     nitroGLYCERIN (NITROSTAT) 0.3 MG SL tablet Place 1 tablet (0.3 mg total) under the tongue every 5 (five) minutes as needed for Chest  pain.    olmesartan (BENICAR) 40 MG tablet Take 1 tablet (40 mg total) by mouth once daily.    polyethylene glycol 3350 (MIRALAX ORAL) Take by mouth. Takes half dose daily, may increase to full dose if needed    QUEtiapine (SEROQUEL) 25 MG Tab TAKE 1 TABLET(25 MG) BY MOUTH EVERY NIGHT AS NEEDED    triamterene-hydrochlorothiazide 37.5-25 mg (MAXZIDE-25) 37.5-25 mg per tablet Take 1/2 tab 5 days a week.     Family History     Problem Relation (Age of Onset)    Cancer Mother, Father, Sister    Hypertension Mother, Father    No Known Problems Maternal Grandmother, Maternal Aunt, Maternal Uncle, Paternal Aunt, Paternal Uncle, Maternal Grandfather, Paternal Grandmother, Paternal Grandfather, Sister        Tobacco Use    Smoking status: Never Smoker    Smokeless tobacco: Never Used    Tobacco comment:  the patient walksabout her house with a walker.she is limited byinstabilityin her left knee. She is Renée's  grandmother.   Substance and Sexual Activity    Alcohol use: No    Drug use: No    Sexual activity: Never     Review of Systems   Unable to perform ROS: dementia     Objective:     Vital Signs (Most Recent):  Temp: 96.3 °F (35.7 °C) (09/20/19 0655)  Pulse: 96 (09/20/19 0836)  Resp: 20 (09/20/19 0836)  BP: (!) 102/59 (09/20/19 0836)  SpO2: 99 % (09/20/19 0836) Vital Signs (24h Range):  Temp:  [96.3 °F (35.7 °C)] 96.3 °F (35.7 °C)  Pulse:  [] 96  Resp:  [20-35] 20  SpO2:  [95 %-100 %] 99 %  BP: ()/(40-70) 102/59     Weight: 50.8 kg (111 lb 15.9 oz)  Body mass index is 22.62 kg/m².    SpO2: 99 %  O2 Device (Oxygen Therapy): room air      Intake/Output Summary (Last 24 hours) at 9/20/2019 0821  Last data filed at 9/20/2019 0722  Gross per 24 hour   Intake 3000 ml   Output --   Net 3000 ml       Lines/Drains/Airways     Peripheral Intravenous Line                 Peripheral IV - Single Lumen 09/20/19 0700 18 G Right Antecubital less than 1 day         Peripheral IV - Single Lumen 09/20/19 20 G Left  Antecubital less than 1 day                Physical Exam   Constitutional: She appears well-developed and well-nourished. She appears listless.   HENT:   Head: Normocephalic and atraumatic.   Eyes: EOM are normal.   Cardiovascular: Normal rate. An irregularly irregular rhythm present. Exam reveals no gallop and no friction rub.   Pulmonary/Chest: Effort normal and breath sounds normal. No stridor. She has no wheezes. She has no rales.   Abdominal: Soft. Bowel sounds are normal. There is no rebound and no guarding.   Musculoskeletal: She exhibits no edema.   Neurological: She appears listless. No cranial nerve deficit.   Skin: Skin is warm and dry.   Psychiatric: She has a normal mood and affect. Her behavior is normal.       Significant Labs: All pertinent lab results from the last 24 hours have been reviewed.      Assessment and Plan:     Acute on chronic systolic heart failure  - PMH includes CAD and severe AS/MR  - [id number] = 6 for newly-diagnosed AF; AC if no contrainidication  - work-up for infection  - trend troponin and ECG  - optimize GDMT as tolerated  - initiate diuresis  - admit to CCU  - discussed with Dr. Milton        VTE Risk Mitigation (From admission, onward)    None          Thank you for your consult. I will follow-up with patient. Please contact us if you have any additional questions.    Vinod Boykin MD  Cardiology   [facility]

## 2019-09-20 NOTE — SUBJECTIVE & OBJECTIVE
Past Medical History:   Diagnosis Date    Arthritis     Cancer     melanoma    Hyperlipidemia     Hyperparathyroidism     Hypertension     Joint pain     Peripheral vascular disease     worse on the right than the left    Thyroid disease     hypothyroidism       Past Surgical History:   Procedure Laterality Date    APPENDECTOMY      Exam under anesthesia N/A 8/17/2018    [facility] 2ND FLR    EXCISION-LESION-FACE Right 6/9/2014    [facility] at [facility] OR 2ND FLR    FISTULOTOMY  8/17/2018    Per[facility] at [facility] OR 2ND FLR    HEMORRHOID SURGERY      PEDICLE FLAP Right 6/9/2014    melolabial flap    WLE right cheek melanoma Right 6/9/2014       Review of patient's allergies indicates:  No Known Allergies    No current facility-administered medications on file prior to encounter.      Current Outpatient Medications on File Prior to Encounter   Medication Sig    ACETAMINOPHEN (TYLENOL ARTHRITIS ORAL) Take 2 tablets by mouth daily as needed.  2 in the morning 2 in the afternoon    alendronate (FOSAMAX) 70 MG tablet TAKE 1 TABLET BY MOUTH ONCE A WEEK    aspirin (ECOTRIN) 81 MG EC tablet Take 81 mg by mouth once daily.     cholecalciferol, vitamin D3, 1,000 unit capsule Take 1,000 Units by mouth once daily.     cilostazol (PLETAL) 100 MG Tab Take 1 tablet (100 mg total) by mouth 2 (two) times daily.    docusate sodium (COLACE) 100 MG capsule Take 100 mg by mouth once daily.    ferrous sulfate 325 (65 FE) MG EC tablet Take 1 tablet (325 mg total) by mouth every other day.    levothyroxine (SYNTHROID) 88 MCG tablet Take 1 tablet (88 mcg total) by mouth once daily.    losartan (COZAAR) 25 MG tablet Take 1 tablet (25 mg total) by mouth once daily.    lovastatin (MEVACOR) 20 MG tablet TAKE 1 TABLET(20 MG) BY MOUTH EVERY EVENING    memantine (NAMENDA) 5 MG Tab Take 1 tablet (5 mg total) by mouth 2 (two) times daily.     multivitamin-minerals-lutein (CENTRUM SILVER) Tab Take 1 tablet by mouth once daily.     nitroGLYCERIN (NITROSTAT) 0.3 MG SL tablet Place 1 tablet (0.3 mg total) under the tongue every 5 (five) minutes as needed for Chest pain.    olmesartan (BENICAR) 40 MG tablet Take 1 tablet (40 mg total) by mouth once daily.    polyethylene glycol 3350 (MIRALAX ORAL) Take by mouth. Takes half dose daily, may increase to full dose if needed    QUEtiapine (SEROQUEL) 25 MG Tab TAKE 1 TABLET(25 MG) BY MOUTH EVERY NIGHT AS NEEDED    triamterene-hydrochlorothiazide 37.5-25 mg (MAXZIDE-25) 37.5-25 mg per tablet Take 1/2 tab 5 days a week.     Family History     Problem Relation (Age of Onset)    Cancer Mother, Father, Sister    Hypertension Mother, Father    No Known Problems Maternal Grandmother, Maternal Aunt, Maternal Uncle, Paternal Aunt, Paternal Uncle, Maternal Grandfather, Paternal Grandmother, Paternal Grandfather, Sister        Tobacco Use    Smoking status: Never Smoker    Smokeless tobacco: Never Used    Tobacco comment:  the patient walksabout her house with a walker.she is limited byinstabilityin her left knee. She is Renée's  grandmother.   Substance and Sexual Activity    Alcohol use: No    Drug use: No    Sexual activity: Never     Review of Systems   Unable to perform ROS: dementia     Objective:     Vital Signs (Most Recent):  Temp: 96.3 °F (35.7 °C) (09/20/19 0655)  Pulse: 96 (09/20/19 0836)  Resp: 20 (09/20/19 0836)  BP: (!) 102/59 (09/20/19 0836)  SpO2: 99 % (09/20/19 0836) Vital Signs (24h Range):  Temp:  [96.3 °F (35.7 °C)] 96.3 °F (35.7 °C)  Pulse:  [] 96  Resp:  [20-35] 20  SpO2:  [95 %-100 %] 99 %  BP: ()/(40-70) 102/59       Weight: 50.8 kg (111 lb 15.9 oz)  Body mass index is 22.62 kg/m².    SpO2: 99 %  O2 Device (Oxygen Therapy): room air    Physical Exam   Constitutional: She is oriented to person, place, and time. She appears well-developed and well-nourished. She appears listless.    HENT:   Head: Normocephalic and atraumatic.   Eyes: EOM are normal.   Cardiovascular: Normal rate. An irregularly irregular rhythm present. Exam reveals no gallop and no friction rub.   Pulmonary/Chest: Effort normal and breath sounds normal. No stridor. She has no wheezes. She has no rales.   Abdominal: Soft. Bowel sounds are normal. There is no rebound and no guarding.   Musculoskeletal: She exhibits no edema.   Neurological: She is oriented to person, place, and time. She appears listless. No cranial nerve deficit.   Skin: Skin is warm and dry.   Psychiatric: She has a normal mood and affect. Her behavior is normal.       Significant Labs: All pertinent lab results from the last 24 hours have been reviewed.

## 2019-09-20 NOTE — SUBJECTIVE & OBJECTIVE
Past Medical History:   Diagnosis Date    Arthritis     Cancer     melanoma    Hyperlipidemia     Hyperparathyroidism     Hypertension     Joint pain     Peripheral vascular disease     worse on the right than the left    Thyroid disease     hypothyroidism       Past Surgical History:   Procedure Laterality Date    APPENDECTOMY      Exam under anesthesia N/A 8/17/2018    Performed by [facility] at [facility] OR [facility]    EXCISION-LESION-FACE Right 6/9/2014    Performed by Roberto Carlos Mcdowell [facility] at [facility] OR [facility]    FISTULOTOMY  8/17/2018    Performed by [facility]ilo [facility] at [facility] OR [facility]    HEMORRHOID SURGERY      PEDICLE FLAP Right 6/9/2014    melolabial flap    WLE right cheek melanoma Right 6/9/2014       Review of patient's allergies indicates:  No Known Allergies    No current facility-administered medications on file prior to encounter.      Current Outpatient Medications on File Prior to Encounter   Medication Sig    ACETAMINOPHEN (TYLENOL ARTHRITIS ORAL) Take 2 tablets by mouth daily as needed.  2 in the morning 2 in the afternoon    alendronate (FOSAMAX) 70 MG tablet TAKE 1 TABLET BY MOUTH ONCE A WEEK    aspirin (ECOTRIN) 81 MG EC tablet Take 81 mg by mouth once daily.     cholecalciferol, vitamin D3, 1,000 unit capsule Take 1,000 Units by mouth once daily.     cilostazol (PLETAL) 100 MG Tab Take 1 tablet (100 mg total) by mouth 2 (two) times daily.    docusate sodium (COLACE) 100 MG capsule Take 100 mg by mouth once daily.    ferrous sulfate 325 (65 FE) MG EC tablet Take 1 tablet (325 mg total) by mouth every other day.    levothyroxine (SYNTHROID) 88 MCG tablet Take 1 tablet (88 mcg total) by mouth once daily.    losartan (COZAAR) 25 MG tablet Take 1 tablet (25 mg total) by mouth once daily.    lovastatin (MEVACOR) 20 MG tablet TAKE 1 TABLET(20 MG) BY MOUTH EVERY EVENING    memantine (NAMENDA) 5 MG Tab Take 1 tablet (5 mg total) by mouth 2 (two) times daily.     multivitamin-minerals-lutein (CENTRUM SILVER) Tab Take 1 tablet by mouth once daily.     nitroGLYCERIN (NITROSTAT) 0.3 MG SL tablet Place 1 tablet (0.3 mg total) under the tongue every 5 (five) minutes as needed for Chest pain.    olmesartan (BENICAR) 40 MG tablet Take 1 tablet (40 mg total) by mouth once daily.    polyethylene glycol 3350 (MIRALAX ORAL) Take by mouth. Takes half dose daily, may increase to full dose if needed    QUEtiapine (SEROQUEL) 25 MG Tab TAKE 1 TABLET(25 MG) BY MOUTH EVERY NIGHT AS NEEDED    triamterene-hydrochlorothiazide 37.5-25 mg (MAXZIDE-25) 37.5-25 mg per tablet Take 1/2 tab 5 days a week.     Family History     Problem Relation (Age of Onset)    Cancer Mother, Father, Sister    Hypertension Mother, Father    No Known Problems Maternal Grandmother, Maternal Aunt, Maternal Uncle, Paternal Aunt, Paternal Uncle, Maternal Grandfather, Paternal Grandmother, Paternal Grandfather, Sister        Tobacco Use    Smoking status: Never Smoker    Smokeless tobacco: Never Used    Tobacco comment:  the patient walksabout her house with a walker.she is limited byinstabilityin her left knee. She is Renée's  grandmother.   Substance and Sexual Activity    Alcohol use: No    Drug use: No    Sexual activity: Never     Review of Systems   Unable to perform ROS: dementia     Objective:     Vital Signs (Most Recent):  Temp: 96.3 °F (35.7 °C) (09/20/19 0655)  Pulse: 96 (09/20/19 0836)  Resp: 20 (09/20/19 0836)  BP: (!) 102/59 (09/20/19 0836)  SpO2: 99 % (09/20/19 0836) Vital Signs (24h Range):  Temp:  [96.3 °F (35.7 °C)] 96.3 °F (35.7 °C)  Pulse:  [] 96  Resp:  [20-35] 20  SpO2:  [95 %-100 %] 99 %  BP: ()/(40-70) 102/59     Weight: 50.8 kg (111 lb 15.9 oz)  Body mass index is 22.62 kg/m².    SpO2: 99 %  O2 Device (Oxygen Therapy): room air      Intake/Output Summary (Last 24 hours) at 9/20/2019 0853  Last data filed at 9/20/2019 0722  Gross per 24 hour   Intake 3000 ml   Output --   Net  3000 ml       Lines/Drains/Airways     Peripheral Intravenous Line                 Peripheral IV - Single Lumen 09/20/19 0700 18 G Right Antecubital less than 1 day         Peripheral IV - Single Lumen 09/20/19 20 G Left Antecubital less than 1 day                Physical Exam   Constitutional: She appears well-developed and well-nourished. She appears listless.   HENT:   Head: Normocephalic and atraumatic.   Eyes: EOM are normal.   Cardiovascular: Normal rate. An irregularly irregular rhythm present. Exam reveals no gallop and no friction rub.   Pulmonary/Chest: Effort normal and breath sounds normal. No stridor. She has no wheezes. She has no rales.   Abdominal: Soft. Bowel sounds are normal. There is no rebound and no guarding.   Musculoskeletal: She exhibits no edema.   Neurological: She appears listless. No cranial nerve deficit.   Skin: Skin is warm and dry.   Psychiatric: She has a normal mood and affect. Her behavior is normal.       Significant Labs: All pertinent lab results from the last 24 hours have been reviewed.

## 2019-09-20 NOTE — NURSING
Patient identified by 2 identifiers. Denies previous reactions to blood transfusions, allergies reviewed & procedure explained to patients family.  IV in place to Rt AC, flushed w/ 10cc NS pre & post contrast administration.  3cc Optison administered, echo images obtained.  Pt tolerated procedure well.

## 2019-09-20 NOTE — ASSESSMENT & PLAN NOTE
- troponin has trended 1.3 to 12  - ACS protocol initiated for medical management, heparin gtt, ASA, plavix loaded, lipid panel normal so will continue with home mevacor.   - Supportive care, no aggressive measures per family  - DNR/ DNI, considering hospice upon discharge  - NO antihypertensives for now as her b/p just finally came up after being in the 60's with a junctional rhythm.  Will monitor o/n and will add low dose BB/ ARB as tolerated   - PAP higher and BNP elevated so will give an extra dose of lasix now that she has stabilized post cardizem dosing.

## 2019-09-20 NOTE — ED NOTES
Per Dr. Singh, administer another 1L NS bolus if pt's BP does not increase after 1st NS 1L bolus.

## 2019-09-21 LAB
ALBUMIN SERPL BCP-MCNC: 3.5 G/DL (ref 3.5–5.2)
ALP SERPL-CCNC: 68 U/L (ref 55–135)
ALT SERPL W/O P-5'-P-CCNC: 36 U/L (ref 10–44)
ANION GAP SERPL CALC-SCNC: 9 MMOL/L (ref 8–16)
APTT BLDCRRT: 43.1 SEC (ref 21–32)
APTT BLDCRRT: 49.3 SEC (ref 21–32)
AST SERPL-CCNC: 173 U/L (ref 10–40)
BASOPHILS # BLD AUTO: 0.03 K/UL (ref 0–0.2)
BASOPHILS NFR BLD: 0.5 % (ref 0–1.9)
BILIRUB SERPL-MCNC: 0.3 MG/DL (ref 0.1–1)
BUN SERPL-MCNC: 40 MG/DL (ref 10–30)
CALCIUM SERPL-MCNC: 9.3 MG/DL (ref 8.7–10.5)
CHLORIDE SERPL-SCNC: 111 MMOL/L (ref 95–110)
CO2 SERPL-SCNC: 20 MMOL/L (ref 23–29)
CREAT SERPL-MCNC: 1.5 MG/DL (ref 0.5–1.4)
DIFFERENTIAL METHOD: ABNORMAL
EOSINOPHIL # BLD AUTO: 0 K/UL (ref 0–0.5)
EOSINOPHIL NFR BLD: 0.5 % (ref 0–8)
ERYTHROCYTE [DISTWIDTH] IN BLOOD BY AUTOMATED COUNT: 15.6 % (ref 11.5–14.5)
EST. GFR  (AFRICAN AMERICAN): 33.9 ML/MIN/1.73 M^2
EST. GFR  (NON AFRICAN AMERICAN): 29.4 ML/MIN/1.73 M^2
GLUCOSE SERPL-MCNC: 77 MG/DL (ref 70–110)
HCT VFR BLD AUTO: 29.2 % (ref 37–48.5)
HGB BLD-MCNC: 9.3 G/DL (ref 12–16)
IMM GRANULOCYTES # BLD AUTO: 0.02 K/UL (ref 0–0.04)
IMM GRANULOCYTES NFR BLD AUTO: 0.3 % (ref 0–0.5)
LYMPHOCYTES # BLD AUTO: 1.2 K/UL (ref 1–4.8)
LYMPHOCYTES NFR BLD: 20.7 % (ref 18–48)
MAGNESIUM SERPL-MCNC: 2 MG/DL (ref 1.6–2.6)
MCH RBC QN AUTO: 32.5 PG (ref 27–31)
MCHC RBC AUTO-ENTMCNC: 31.8 G/DL (ref 32–36)
MCV RBC AUTO: 102 FL (ref 82–98)
MONOCYTES # BLD AUTO: 0.6 K/UL (ref 0.3–1)
MONOCYTES NFR BLD: 10.2 % (ref 4–15)
NEUTROPHILS # BLD AUTO: 4 K/UL (ref 1.8–7.7)
NEUTROPHILS NFR BLD: 67.8 % (ref 38–73)
NRBC BLD-RTO: 0 /100 WBC
PLATELET # BLD AUTO: 143 K/UL (ref 150–350)
PMV BLD AUTO: 10.8 FL (ref 9.2–12.9)
POTASSIUM SERPL-SCNC: 3.9 MMOL/L (ref 3.5–5.1)
PROT SERPL-MCNC: 6.1 G/DL (ref 6–8.4)
RBC # BLD AUTO: 2.86 M/UL (ref 4–5.4)
SODIUM SERPL-SCNC: 140 MMOL/L (ref 136–145)
TROPONIN I SERPL DL<=0.01 NG/ML-MCNC: >50 NG/ML (ref 0–0.03)
WBC # BLD AUTO: 5.88 K/UL (ref 3.9–12.7)

## 2019-09-21 PROCEDURE — 97161 PT EVAL LOW COMPLEX 20 MIN: CPT

## 2019-09-21 PROCEDURE — 83735 ASSAY OF MAGNESIUM: CPT

## 2019-09-21 PROCEDURE — 36415 COLL VENOUS BLD VENIPUNCTURE: CPT

## 2019-09-21 PROCEDURE — 99232 SBSQ HOSP IP/OBS MODERATE 35: CPT | Mod: ,,, | Performed by: NURSE PRACTITIONER

## 2019-09-21 PROCEDURE — 25000003 PHARM REV CODE 250: Performed by: HOSPITALIST

## 2019-09-21 PROCEDURE — 20600001 HC STEP DOWN PRIVATE ROOM

## 2019-09-21 PROCEDURE — 97165 OT EVAL LOW COMPLEX 30 MIN: CPT

## 2019-09-21 PROCEDURE — 99232 PR SUBSEQUENT HOSPITAL CARE,LEVL II: ICD-10-PCS | Mod: ,,, | Performed by: NURSE PRACTITIONER

## 2019-09-21 PROCEDURE — 84484 ASSAY OF TROPONIN QUANT: CPT

## 2019-09-21 PROCEDURE — 85730 THROMBOPLASTIN TIME PARTIAL: CPT | Mod: 91

## 2019-09-21 PROCEDURE — 97535 SELF CARE MNGMENT TRAINING: CPT

## 2019-09-21 PROCEDURE — 85025 COMPLETE CBC W/AUTO DIFF WBC: CPT

## 2019-09-21 PROCEDURE — 63600175 PHARM REV CODE 636 W HCPCS: Performed by: NURSE PRACTITIONER

## 2019-09-21 PROCEDURE — 85730 THROMBOPLASTIN TIME PARTIAL: CPT

## 2019-09-21 PROCEDURE — 97530 THERAPEUTIC ACTIVITIES: CPT

## 2019-09-21 PROCEDURE — 80053 COMPREHEN METABOLIC PANEL: CPT

## 2019-09-21 PROCEDURE — 93005 ELECTROCARDIOGRAM TRACING: CPT

## 2019-09-21 PROCEDURE — 25000003 PHARM REV CODE 250: Performed by: NURSE PRACTITIONER

## 2019-09-21 PROCEDURE — 63600175 PHARM REV CODE 636 W HCPCS: Performed by: HOSPITALIST

## 2019-09-21 PROCEDURE — 93010 ELECTROCARDIOGRAM REPORT: CPT | Mod: ,,, | Performed by: INTERNAL MEDICINE

## 2019-09-21 PROCEDURE — 87040 BLOOD CULTURE FOR BACTERIA: CPT | Mod: 59

## 2019-09-21 PROCEDURE — 25000003 PHARM REV CODE 250: Performed by: STUDENT IN AN ORGANIZED HEALTH CARE EDUCATION/TRAINING PROGRAM

## 2019-09-21 PROCEDURE — 93010 EKG 12-LEAD: ICD-10-PCS | Mod: ,,, | Performed by: INTERNAL MEDICINE

## 2019-09-21 RX ORDER — POLYETHYLENE GLYCOL 3350 17 G/17G
17 POWDER, FOR SOLUTION ORAL 2 TIMES DAILY
Status: DISCONTINUED | OUTPATIENT
Start: 2019-09-21 | End: 2019-09-23 | Stop reason: HOSPADM

## 2019-09-21 RX ADMIN — HEPARIN SODIUM 13 UNITS/KG/HR: 10000 INJECTION, SOLUTION INTRAVENOUS at 01:09

## 2019-09-21 RX ADMIN — MEMANTINE HYDROCHLORIDE 5 MG: 5 TABLET ORAL at 09:09

## 2019-09-21 RX ADMIN — LOVASTATIN 20 MG: 20 TABLET ORAL at 09:09

## 2019-09-21 RX ADMIN — RAMELTEON 8 MG: 8 TABLET ORAL at 09:09

## 2019-09-21 RX ADMIN — CLOPIDOGREL BISULFATE 75 MG: 75 TABLET ORAL at 09:09

## 2019-09-21 RX ADMIN — DOCUSATE SODIUM 100 MG: 100 CAPSULE, LIQUID FILLED ORAL at 09:09

## 2019-09-21 RX ADMIN — POLYETHYLENE GLYCOL 3350 17 G: 17 POWDER, FOR SOLUTION ORAL at 12:09

## 2019-09-21 RX ADMIN — ACETAMINOPHEN 650 MG: 325 TABLET ORAL at 09:09

## 2019-09-21 RX ADMIN — ASPIRIN 81 MG: 81 TABLET, COATED ORAL at 09:09

## 2019-09-21 RX ADMIN — PIPERACILLIN AND TAZOBACTAM 4.5 G: 4; .5 INJECTION, POWDER, LYOPHILIZED, FOR SOLUTION INTRAVENOUS; PARENTERAL at 09:09

## 2019-09-21 RX ADMIN — VITAMIN D, TAB 1000IU (100/BT) 1000 UNITS: 25 TAB at 09:09

## 2019-09-21 RX ADMIN — CILOSTAZOL 100 MG: 50 TABLET ORAL at 09:09

## 2019-09-21 RX ADMIN — LEVOTHYROXINE SODIUM 88 MCG: 88 TABLET ORAL at 06:09

## 2019-09-21 NOTE — PT/OT/SLP EVAL
Physical Therapy Evaluation    Patient Name:  Jayda Rendon   MRN:  [id number]    Recommendations:     Discharge Recommendations:  home   Discharge Equipment Recommendations: none   Barriers to discharge: None    Assessment:     Jayda Rendon is a 89 y.o. female admitted with a medical diagnosis of NSTEMI (non-ST elevated myocardial infarction).  She presents with the following impairments/functional limitations:  weakness, impaired functional mobilty, impaired endurance, gait instability, impaired balance, impaired self care skills. Pt very close to functional baseline performing all mobility on this date with CGA/Min A. Pt lives with 96 y/o sister but has good family support and a family member present 24/7. Pt has no therapy needs following d/c as family reports they have had HHPT previously and do not feel it is warranted at this time. Pt will be followed x2 day/wk during this admission for continued therapy.     Rehab Prognosis: Fair; patient would benefit from acute skilled PT services to address these deficits and reach maximum level of function.    Recent Surgery: * No surgery found *      Plan:     During this hospitalization, patient to be seen 2 x/week to address the identified rehab impairments via gait training, therapeutic activities, therapeutic exercises, neuromuscular re-education and progress toward the following goals:    · Plan of Care Expires:  10/21/19    Subjective     Chief Complaint: none noted   Patient/Family Comments/goals: Pt pleasant and willing to participate with therapy on this date.    Pain/Comfort:  · Pain Rating 1: 0/10    Patients cultural, spiritual, Jew conflicts given the current situation: no    Living Environment:  Pt lives with 96 y/o sister in [facility] with ramped entrance and has good family support and a family member present 24/7  Prior to admission, patients level of function was amb with RW requiring assist for bathing, dressing, and meal prep.  Equipment used  at home: walker, rolling, bedside commode, wheelchair.  DME owned (not currently used): none.  Upon discharge, patient will have assistance from family.    Objective:     Communicated with RN prior to session.  Patient found supine with peripheral IV, telemetry, oxygen  upon PT entry to room.    General Precautions: Standard, fall, hearing impaired   Orthopedic Precautions:N/A   Braces: N/A     Exams:  · Gross Motor Coordination:  WFL  · RLE ROM: WFL  · RLE Strength: WFL  · LLE ROM: WFL  · LLE Strength: WFL    Functional Mobility:  · Bed Mobility:     · Rolling Right: contact guard assistance  · Supine to Sit: independence and contact guard assistance  · Transfers:     · Sit to Stand:  minimum assistance with rolling walker  · Gait: 45ft with RW CGA and Mod RW management. pt demonstrating decreased step length, height, and candence. pt with forward flex noted but no LOB  · Balance: Sitting: SBA    Standing: CGA      Therapeutic Activities and Exercises:   - Pt educated on:   -PT roles, expectations, and POC    -Safety with mobility   -Benefits of OOB activities to increase strength and functional mobility    -Performing ther ex for increasing LE ROM and strength   -Discharge recommendations     AM-PAC 6 CLICK MOBILITY  Total Score:17     Patient left up in chair with all lines intact, call button in reach and family present.    GOALS:   Multidisciplinary Problems     Physical Therapy Goals        Problem: Physical Therapy Goal    Goal Priority Disciplines Outcome Goal Variances Interventions   Physical Therapy Goal     PT, PT/OT Ongoing (interventions implemented as appropriate)     Description:  Goals to be met by: 10/21/2019    Patient will increase functional independence with mobility by performin. Supine to sit with Stand-by Assistance  2. Sit to supine with Stand-by Assistance  3. Sit to stand transfer with Contact Guard Assistance  4. Gait  x 75 feet with Contact Guard Assistance using LRAD  5. Lower  extremity exercise program x15 reps per handout, with independence                     History:     Past Medical History:   Diagnosis Date    Arthritis     Cancer     melanoma    Hyperlipidemia     Hyperparathyroidism     Hypertension     Joint pain     Peripheral vascular disease     worse on the right than the left    Thyroid disease     hypothyroidism       Past Surgical History:   Procedure Laterality Date    APPENDECTOMY      Exam under anesthesia N/A 8/17/2018    Performed by [facility] at [facility] OR [address] [place] [address]    EXCISION-LESION-FACE Right 6/9/2014    [facility]    FISTULOTOMY  8/17/2018    [facility]    HEMORRHOID SURGERY      PEDICLE FLAP Right 6/9/2014    melolabial flap    WLE right cheek melanoma Right 6/9/2014       Time Tracking:     PT Received On: 09/21/19  PT Start Time: 0937     PT Stop Time: 1000  PT Total Time (min): 23 min     Billable Minutes: Evaluation 10 and Therapeutic Activity 13      Nick Luu, PT  09/21/2019

## 2019-09-21 NOTE — PLAN OF CARE
Problem: Physical Therapy Goal  Goal: Physical Therapy Goal  Goals to be met by: 10/21/2019    Patient will increase functional independence with mobility by performin. Supine to sit with Stand-by Assistance  2. Sit to supine with Stand-by Assistance  3. Sit to stand transfer with Contact Guard Assistance  4. Gait  x 75 feet with Contact Guard Assistance using LRAD  5. Lower extremity exercise program x15 reps per handout, with independence   Outcome: Ongoing (interventions implemented as appropriate)  Eval completed and POC established     Parveen Luu, PT, DPT  2019

## 2019-09-21 NOTE — PT/OT/SLP EVAL
"Occupational Therapy   Evaluation    Name: Jayda Rendon  MRN: [id number]  Admitting Diagnosis:  NSTEMI (non-ST elevated myocardial infarction)      Recommendations:     Discharge Recommendations: home  Discharge Equipment Recommendations:  none  Barriers to discharge:  None    Assessment:     Jayda Rendon is a 89 y.o. female with a medical diagnosis of NSTEMI (non-ST elevated myocardial infarction).  Pt presents with decreased endurance and impaired mobility performance as limited by cardiovascular status and generalized weakness. Pt with baseline dementia living with her 97 yr old sister in [facility]. PTA, pt receiving caregiver services for bathing and has family 24-hrs for all other BADLs/IADLs. Pt's family present in room during OT/PT evaluation and very supportive throughout session. Pt demo CGA with bed mobility and min (A) with sit>stand t/f using RW. Pt required vc's and pt hard of hearing. Performance deficits affecting function: weakness, impaired endurance, impaired self care skills, impaired functional mobilty, gait instability, impaired cognition, decreased coordination, decreased lower extremity function, decreased safety awareness, impaired cardiopulmonary response to activity.  Pt would benefit from continued OT skilled services 2x/wk to improve daily living skills to optimize QOL. Pt is recommended to discharge to home at this time.      Rehab Prognosis: Good; patient would benefit from acute skilled OT services to address these deficits and reach maximum level of function.       Plan:     Patient to be seen 2 x/week to address the above listed problems via therapeutic activities, therapeutic exercises, self-care/home management  · Plan of Care Expires: 10/21/19  · Plan of Care Reviewed with: patient, family    Subjective     Chief Complaint: None stated   Patient/Family Comments/goals: "Her R knee seems to buckle but she has a brace for it"- per family report     Occupational Profile:  Living " Environment: Pt lives with elderly sister in [facility] with ramp entry. Pt has bath bench for bathing.   Previous level of function: Caregiver assist with bathing 1x/wk. Family assist 24-hrs for all ADLs for both pt and pt's elderly sister. Pt's baseline mobility includes RW and w/c for community distances.  Roles and Routines: Pt enjoys spending time with family.  Equipment Used at Home:  bedside commode, walker, rolling, wheelchair, bath bench  Assistance upon Discharge: Family and caregiver assistance    Pain/Comfort:  · Pain Rating 1: 0/10  · Pain Rating Post-Intervention 1: 0/10  · Pain Rating Post-Intervention 2: 0/10    Patients cultural, spiritual, Episcopalian conflicts given the current situation: no    Objective:     Communicated with: RN prior to session.  Patient found HOB elevated with peripheral IV, oxygen, telemetry upon OT entry to room.    General Precautions: Standard, fall, hearing impaired   Orthopedic Precautions:N/A   Braces: N/A     Occupational Performance:    Bed Mobility:    · Patient completed Rolling/Turning to Left with  contact guard assistance  · Patient completed Scooting/Bridging with contact guard assistance  · Patient completed Supine to Sit with contact guard assistance    Functional Mobility/Transfers:  · Patient completed Sit <> Stand Transfer with minimum assistance  with  rolling walker   · Patient completed Bed <> Chair Transfer using Step Transfer technique with minimum assistance with rolling walker  · Functional Mobility: Pt completed bedroom and short distance hallway ambulation with RW and vc's. Pt [place] requiring additional physical guidance of RW.     Activities of Daily Living:  · Upper Body Dressing: minimum assistance donning gown at EOB   · Lower Body Dressing: total assistance donning socks with HOB elevated     Cognitive/Visual Perceptual:  Cognitive/Psychosocial Skills:     -       Oriented to: Person, Place, Time and Situation   -       Follows Commands/attention:Easily  distracted  -       Communication: clear/fluent  -       Memory: Impaired STM, Impaired LTM and Poor immediate recall  -       Safety awareness/insight to disability: impaired   -       Mood/Affect/Coping skills/emotional control: Pleasant    Physical Exam:  Balance:    -       Demo SBA for EOB balance, fair standing balance using RW  Upper Extremity Range of Motion:     -       Right Upper Extremity: WFL  -       Left Upper Extremity: WFL  Upper Extremity Strength:    -       Right Upper Extremity: WFL  -       Left Upper Extremity: WFL   Strength:    -       Right Upper Extremity: WFL  -       Left Upper Extremity: WFL    AMPAC 6 Click ADL:  AMPAC Total Score: 19    Treatment & Education:  Pt educated on role of occupational therapy, POC, and safety during ADLs and functional mobility. Pt and OT discussed importance of safe, continued mobility to optimize daily living skills. Pt verbalized understanding. White board updated during session. Pt given instruction to call for medical staff/nurse for assistance.      Education:    Patient left UIC with all lines intact, call button in reach and family present    GOALS:   Multidisciplinary Problems     Occupational Therapy Goals        Problem: Occupational Therapy Goal    Goal Priority Disciplines Outcome Interventions   Occupational Therapy Goal     OT, PT/OT Ongoing (interventions implemented as appropriate)    Description:  Goals to be met by: 9/29/19     Patient will increase functional independence with ADLs by performing:    UE Dressing with Supervision.  LE Dressing with Supervision.  Grooming while standing with Supervision.  Toileting from toilet with Contact Guard Assistance for hygiene and clothing management.   Rolling to Bilateral with Supervision.   Supine to sit with Supervision.  Step transfer with Contact Guard Assistance using RW.  Toilet transfer to toilet with Contact Guard Assistance using RW.                      History:     Past Medical  History:   Diagnosis Date    Arthritis     Cancer     melanoma    Hyperlipidemia     Hyperparathyroidism     Hypertension     Joint pain     Peripheral vascular disease     worse on the right than the left    Thyroid disease     hypothyroidism       Past Surgical History:   Procedure Laterality Date    APPENDECTOMY      Exam under anesthesia N/A 8/17/2018    Performed by [facility] at [facility] OR [address] [place] [address]    EXCISION-LESION-FACE Right 6/9/2014    Performed by Roberto Carlos Mcdowell MD at [facility] OR [address] [place] [address]    FISTULOTOMY  8/17/2018    Performed by [facility]o Camilo MD at [facility] OR [address] [place] [address]    HEMORRHOID SURGERY      PEDICLE FLAP Right 6/9/2014    melolabial flap    WLE right cheek melanoma Right 6/9/2014       Time Tracking:     OT Date of Treatment: 09/21/19  OT Start Time: 0936  OT Stop Time: 1000  OT Total Time (min): 24 min    Billable Minutes:Evaluation 16 min  Self Care/Home Management 8 min    Connie Barillas OT  9/21/2019

## 2019-09-21 NOTE — SUBJECTIVE & OBJECTIVE
Interval History: Her appetite has returned, she is conversant without dyspnea, she is HDS and without CP, LH or dizzy.     Review of Systems   Constitutional: Negative for activity change (resolved), appetite change (resolved) and fatigue (resolved).   HENT: Negative for congestion and trouble swallowing.    Eyes: Negative.    Respiratory: Negative for cough, chest tightness and shortness of breath.    Cardiovascular: Positive for leg swelling. Negative for chest pain and palpitations.   Gastrointestinal: Positive for constipation. Negative for abdominal distention, abdominal pain, blood in stool, diarrhea, nausea and vomiting.   Endocrine: Negative for cold intolerance and heat intolerance.   Genitourinary: Negative for dysuria, frequency, hematuria and urgency.   Musculoskeletal: Negative for arthralgias, back pain, myalgias and neck pain.   Skin: Negative for color change, pallor and rash.   Allergic/Immunologic: Negative for immunocompromised state.   Neurological: Positive for weakness. Negative for dizziness, tremors, syncope, light-headedness, numbness and headaches.   Hematological: Does not bruise/bleed easily.   Psychiatric/Behavioral: Positive for behavioral problems and confusion. Negative for agitation. The patient is not nervous/anxious.         Dementia       Objective:     Vital Signs (Most Recent):  Temp: 98.2 °F (36.8 °C) (09/21/19 0812)  Pulse: 60 (09/21/19 0812)  Resp: 16 (09/21/19 0812)  BP: (!) 99/56 (09/21/19 0812)  SpO2: 97 % (09/21/19 0812) Vital Signs (24h Range):  Temp:  [96.5 °F (35.8 °C)-98.2 °F (36.8 °C)] 98.2 °F (36.8 °C)  Pulse:  [50-80] 60  Resp:  [16-22] 16  SpO2:  [93 %-100 %] 97 %  BP: ()/(53-78) 99/56     Weight: 47.2 kg (104 lb)  Body mass index is 20.31 kg/m².    Intake/Output Summary (Last 24 hours) at 9/21/2019 1043  Last data filed at 9/21/2019 0500  Gross per 24 hour   Intake 172.13 ml   Output 1075 ml   Net -902.87 ml      Physical Exam   Constitutional: She appears  well-developed and well-nourished. No distress.   HENT:   Head: Normocephalic and atraumatic.   Right Ear: External ear normal.   Left Ear: External ear normal.   Nose: Nose normal.   Mouth/Throat: Oropharynx is clear and moist.   Eyes: Conjunctivae and EOM are normal. Right eye exhibits no discharge. Left eye exhibits no discharge. No scleral icterus.   Neck: Normal range of motion. Neck supple. Hepatojugular reflux and JVD present. No tracheal deviation present. No thyromegaly present.   Cardiovascular: Normal rate, regular rhythm and intact distal pulses. Exam reveals no gallop and no friction rub.   Murmur (AS tight harsh high pitched, Mitral loud blowing systolic) heard.  Pulmonary/Chest: Effort normal. No respiratory distress. She has no wheezes. She has rales (bibasilar).   Abdominal: Soft. Bowel sounds are normal. She exhibits no distension. There is no tenderness. There is no guarding.   Musculoskeletal: Normal range of motion. She exhibits edema (bilateral ankle +2-3). She exhibits no tenderness.   Neurological: She is alert. No cranial nerve deficit or sensory deficit. She exhibits normal muscle tone. Coordination normal.   Skin: Skin is warm and dry. No rash noted. No erythema.   Psychiatric: She has a normal mood and affect. Her behavior is normal. Her speech is tangential. Cognition and memory are impaired. She expresses inappropriate judgment.   [place], dementia/ confused, pleasant       Nursing note and vitals reviewed.      Significant Labs:   A1C:   Recent Labs   Lab 09/20/19  0656 09/20/19  1147   HGBA1C 4.9 5.1     Blood Culture:   Recent Labs   Lab 09/20/19  1319 09/20/19  1320   LABBLOO No Growth to date Gram stain jarad bottle: Gram negative rods   Results called to and read back by:Marlene Greenfield LPN 09/21/2019  06:16     CMP:   Recent Labs   Lab 09/20/19  0656 09/21/19  0340    140   K 4.1 3.9   * 111*   CO2 17* 20*   * 77   BUN 34* 40*   CREATININE 1.4 1.5*   CALCIUM 9.8 9.3    PROT 6.6 6.1   ALBUMIN 3.7 3.5   BILITOT 0.4 0.3   ALKPHOS 61 68   AST 13 173*   ALT 7* 36   ANIONGAP 14 9   EGFRNONAA 32.0* 29.4*     Cardiac Markers:   Recent Labs   Lab 09/20/19  0656   BNP 2,426*     Lipid Panel:   Recent Labs   Lab 09/20/19  0656   CHOL 163   HDL 64   LDLCALC 77.6   TRIG 107   CHOLHDL 39.3     Magnesium:   Recent Labs   Lab 09/21/19  0340   MG 2.0     Troponin:   Recent Labs   Lab 09/20/19  1319 09/20/19  1722 09/21/19  0819   TROPONINI 12.472* 46.670* >50.000*     TSH:   Recent Labs   Lab 09/20/19  0656   TSH 2.581       Significant Imaging: Echo: I have reviewed all pertinent results/findings within the past 24 hours and my personal findings are:  as noted above

## 2019-09-21 NOTE — ASSESSMENT & PLAN NOTE
- troponin has trended 1.3 to > 50 K  - ACS protocol initiated for medical management, heparin gtt, ASA and plavix loaded, lipid panel normal so will continue with home mevacor.   - Supportive care, no aggressive measures per family  - DNR/ DNI, considering hospice upon discharge  - NO antihypertensives for now as her b/p just finally came up after being in the 60's with a junctional rhythm.   - HDS, sbp still low 100's and HR 50's now back in Afib   - monitor and will add low dose BB/ ARB as tolerated   - PAP higher and BNP elevated given extra dose of lasix now that she has stabilized post cardizem dosing. Cr creeping up, will hold on lasix today, consider tomorrow

## 2019-09-21 NOTE — ASSESSMENT & PLAN NOTE
- secondary to ischemic CM  - supportive care as tolerated  - attempted diuresis as Echo showed CVP 8 and CXR with pulm edema; CR now rising, most likely d/t acute event and not lasix.  She is stable right now, will not push diuresis today as to get her b/p and CR stabilized  - will resume GDMT as soon as HDS

## 2019-09-21 NOTE — PLAN OF CARE
Problem: Adult Inpatient Plan of Care  Goal: Plan of Care Review  Outcome: Revised  Pt free of falls/trauma/injuries.  Denies c/o SOB, CP, or discomfort.  Generalized skin remains CDI; No edema noted.  Pt remains on Heparin gtts.   Pt tolerating plan of care.

## 2019-09-21 NOTE — ASSESSMENT & PLAN NOTE
- moderate/ severe, needs afterload reduction once she's able to tolerate, right now b/p's too low to add

## 2019-09-21 NOTE — PLAN OF CARE
Problem: Adult Inpatient Plan of Care  Goal: Patient-Specific Goal (Individualization)  Outcome: Ongoing (interventions implemented as appropriate)  Plan of care discussed with patient. Patient is free of fall/trauma/injury. Denies CP, SOB, or pain/discomfort.heparin gtt maintained per nomogram. IV Zosyn Q12 administered per order. All questions addressed. Will continue to monitor.

## 2019-09-21 NOTE — PROGRESS NOTES
[facility] Hospital Medicine  Progress Note    Patient Name: Jayda Rendon  MRN: [id number]  Patient Class: IP- Inpatient   Admission Date: 9/20/2019  Length of Stay: 1 days  Attending Physician: Ayleen Lucia MD  Primary Care Provider: Dolly Grijalva MD    [facility] Medicine Team: [facility] KINDRA MED DANIEL Fay NP    Subjective:     Principal Problem:NSTEMI (non-ST elevated myocardial infarction)        HPI:  89 y/o F with PMH of severe AS 0.8 MG 32, non-rheumatic Mod/ Severe MR, Phtn 49mmHg, HFrEF 30%, CVP 8 (8/19), HTN, hypothyroidism, malignant melanoma, CAD and ICM who presents with CP, diaphoresis and acute weakness.  She was found to have Afib with RVR upon presentation to the ED, at which time they gave her a dose of IV diltiazem and IV furosemide which converted her into a junctional rhythm of 30 bpm and dropped her pressure into the 60s'.  She was found mentating at her normal but confused / dementia baseline.  She has three providers (two nephews and a niece) who are her decision makers. She has an extensive extended family.  She lives with her 96 y/o sister and two nephews.   The family noticed that she was taking longer in the bathroom this morning. They went inside the bathroom and noticed that she was pale and diaphoretic. She reported CP, so she was given a SL NTG 0.3 in the ambulance. EMS noted SBP in the 60s. She was brought to [facility] ER and given 2L NS,  mg, and IV diltiazem 10 mg for AF with RVR.   In the ED, Hgb 10.4, BNP 2426, troponin 1.3, and CXR c/w pulmonary edema. Her EKG is c/w inferior non-specific ST changes. In August, her ECHO demonstrated an EF of 30%, LAD territory WMA c/w prior large infarction, VINEET 0.8, peak velocity 3.9, mean gradient 32, and moderate to severe MR.     She is a known DNR/ DNI.  She has been steadily declining over the past month, with a 7 lbs weight loss d/t poor appetite/ early satiety.  She has been sleeping more though she is still able  to get around using her walker.  She does not want aggressive measures or procedures and the family is agreeble, however they would like her admitted for management of her acute MI.     Overview/Hospital Course:  Admitted to hospital medicine with acute NSTEMI and acute decompensated systolic heart failure.  She was started on ACS protocol with heparin gtt, plavix and ASA load, now daily.  She her junctional bradycardia has transitioned to a slow Afib 50's.  Her troponins have peaked > 50K.  Echo EF unchanged 30%, Local WMA, Grade II LV DDx, LVH, Mod severe AS 0.65/ MG 28, low stroke volume 30 cc/m2, Anteriorly directed MR, Normal RV fxn, PAP 77mmHg, CVP 8, severe LAE.  She is chest pain free and hemodynamically stable.  PT/OT planning to eval today.  She uses a walker at home.  Will hold off on anymore diuresis for today since Cr is creeping up, most likely d/t acute event yesterday.  She is breathing well with no conversational dyspnea.  Will eval in am.    Dispo; home tomorrow or Monday pending eval    Interval History: Her appetite has returned, she is conversant without dyspnea, she is HDS and without CP, LH or dizzy.     Review of Systems   Constitutional: Negative for activity change (resolved), appetite change (resolved) and fatigue (resolved).   HENT: Negative for congestion and trouble swallowing.    Eyes: Negative.    Respiratory: Negative for cough, chest tightness and shortness of breath.    Cardiovascular: Positive for leg swelling. Negative for chest pain and palpitations.   Gastrointestinal: Positive for constipation. Negative for abdominal distention, abdominal pain, blood in stool, diarrhea, nausea and vomiting.   Endocrine: Negative for cold intolerance and heat intolerance.   Genitourinary: Negative for dysuria, frequency, hematuria and urgency.   Musculoskeletal: Negative for arthralgias, back pain, myalgias and neck pain.   Skin: Negative for color change, pallor and rash.    Allergic/Immunologic: Negative for immunocompromised state.   Neurological: Positive for weakness. Negative for dizziness, tremors, syncope, light-headedness, numbness and headaches.   Hematological: Does not bruise/bleed easily.   Psychiatric/Behavioral: Positive for behavioral problems and confusion. Negative for agitation. The patient is not nervous/anxious.         Dementia       Objective:     Vital Signs (Most Recent):  Temp: 98.2 °F (36.8 °C) (09/21/19 0812)  Pulse: 60 (09/21/19 0812)  Resp: 16 (09/21/19 0812)  BP: (!) 99/56 (09/21/19 0812)  SpO2: 97 % (09/21/19 0812) Vital Signs (24h Range):  Temp:  [96.5 °F (35.8 °C)-98.2 °F (36.8 °C)] 98.2 °F (36.8 °C)  Pulse:  [50-80] 60  Resp:  [16-22] 16  SpO2:  [93 %-100 %] 97 %  BP: ()/(53-78) 99/56     Weight: 47.2 kg (104 lb)  Body mass index is 20.31 kg/m².    Intake/Output Summary (Last 24 hours) at 9/21/2019 1043  Last data filed at 9/21/2019 0500  Gross per 24 hour   Intake 172.13 ml   Output 1075 ml   Net -902.87 ml      Physical Exam   Constitutional: She appears well-developed and well-nourished. No distress.   HENT:   Head: Normocephalic and atraumatic.   Right Ear: External ear normal.   Left Ear: External ear normal.   Nose: Nose normal.   Mouth/Throat: Oropharynx is clear and moist.   Eyes: Conjunctivae and EOM are normal. Right eye exhibits no discharge. Left eye exhibits no discharge. No scleral icterus.   Neck: Normal range of motion. Neck supple. Hepatojugular reflux and JVD present. No tracheal deviation present. No thyromegaly present.   Cardiovascular: Normal rate, regular rhythm and intact distal pulses. Exam reveals no gallop and no friction rub.   Murmur (AS tight harsh high pitched, Mitral loud blowing systolic) heard.  Pulmonary/Chest: Effort normal. No respiratory distress. She has no wheezes. She has rales (bibasilar).   Abdominal: Soft. Bowel sounds are normal. She exhibits no distension. There is no tenderness. There is no  guarding.   Musculoskeletal: Normal range of motion. She exhibits edema (bilateral ankle +2-3). She exhibits no tenderness.   Neurological: She is alert. No cranial nerve deficit or sensory deficit. She exhibits normal muscle tone. Coordination normal.   Skin: Skin is warm and dry. No rash noted. No erythema.   Psychiatric: She has a normal mood and affect. Her behavior is normal. Her speech is tangential. Cognition and memory are impaired. She expresses inappropriate judgment.   [place], dementia/ confused, pleasant       Nursing note and vitals reviewed.      Significant Labs:   A1C:   Recent Labs   Lab 09/20/19  0656 09/20/19  1147   HGBA1C 4.9 5.1     Blood Culture:   Recent Labs   Lab 09/20/19  1319 09/20/19  1320   LABBLOO No Growth to date Gram stain jarad bottle: Gram negative rods   Results called to and read back by:Marlene Greenfield LPN 09/21/2019  06:16     CMP:   Recent Labs   Lab 09/20/19  0656 09/21/19  0340    140   K 4.1 3.9   * 111*   CO2 17* 20*   * 77   BUN 34* 40*   CREATININE 1.4 1.5*   CALCIUM 9.8 9.3   PROT 6.6 6.1   ALBUMIN 3.7 3.5   BILITOT 0.4 0.3   ALKPHOS 61 68   AST 13 173*   ALT 7* 36   ANIONGAP 14 9   EGFRNONAA 32.0* 29.4*     Cardiac Markers:   Recent Labs   Lab 09/20/19  0656   BNP 2,426*     Lipid Panel:   Recent Labs   Lab 09/20/19  0656   CHOL 163   HDL 64   LDLCALC 77.6   TRIG 107   CHOLHDL 39.3     Magnesium:   Recent Labs   Lab 09/21/19  0340   MG 2.0     Troponin:   Recent Labs   Lab 09/20/19  1319 09/20/19  1722 09/21/19  0819   TROPONINI 12.472* 46.670* >50.000*     TSH:   Recent Labs   Lab 09/20/19  0656   TSH 2.581       Significant Imaging: Echo: I have reviewed all pertinent results/findings within the past 24 hours and my personal findings are:  as noted above      Assessment/Plan:      * NSTEMI (non-ST elevated myocardial infarction)  - troponin has trended 1.3 to > 50 K  - ACS protocol initiated for medical management, heparin gtt, ASA and plavix loaded,  lipid panel normal so will continue with home mevacor.   - Supportive care, no aggressive measures per family  - DNR/ DNI, considering hospice upon discharge  - NO antihypertensives for now as her b/p just finally came up after being in the 60's with a junctional rhythm.   - HDS, sbp still low 100's and HR 50's now back in Afib   - monitor and will add low dose BB/ ARB as tolerated   - PAP higher and BNP elevated given extra dose of lasix now that she has stabilized post cardizem dosing. Cr creeping up, will hold on lasix today, consider tomorrow      Paroxysmal atrial fibrillation  - given dose of diltiazem 10mg IVP in ED with resultant conversion to junctional bradycardia 30's and hypotension in the 60's.  - converted to SR to 60's for short period now Afib rosalba 50's.   -not a long term candidate for AC as she's high risk fall  - family considering DCCV if she goes back into afib with RVR with knowledge she would need sedation and 30 days of AC post procedure, not recommending at this juncture, severe LAE she would not stay in SR long      Palliative care encounter  - discussing hospice   - DNR/ DNI paperwork on chart      Acute on chronic systolic heart failure  - see above       Acute decompensated heart failure  - secondary to ischemic CM  - supportive care as tolerated  - attempted diuresis as Echo showed CVP 8 and CXR with pulm edema; CR now rising, most likely d/t acute event and not lasix.  She is stable right now, will not push diuresis today as to get her b/p and CR stabilized  - will resume GDMT as soon as HDS       Mitral regurgitation  - moderate/ severe, needs afterload reduction once she's able to tolerate, right now b/p's too low to add      Nonrheumatic aortic valve stenosis  - VINEET 0.6 and MG 28, EF 30%      Hypothyroidism  - TSH wnl        Hyperlipidemia  - lipid panel adequate on mevacor        VTE Risk Mitigation (From admission, onward)        Ordered     heparin 25,000 units in dextrose 5%  250 mL (100 units/mL) infusion LOW INTENSITY nomogram - [facility]  Continuous      09/20/19 1121     heparin 25,000 units in dextrose 5% (100 units/ml) IV bolus from bag - ADDITIONAL PRN BOLUS - 60 units/kg (max bolus 4000 units)  As needed (PRN)      09/20/19 1121     heparin 25,000 units in dextrose 5% (100 units/ml) IV bolus from bag - ADDITIONAL PRN BOLUS - 30 units/kg (max bolus 4000 units)  As needed (PRN)      09/20/19 1121     IP VTE HIGH RISK PATIENT  Once      09/20/19 1121     Reason for no Mechanical VTE Prophylaxis  Once      09/20/19 1121                Mckenzie Fya NP  Department of Hospital Medicine   [facility]

## 2019-09-21 NOTE — ASSESSMENT & PLAN NOTE
- given dose of diltiazem 10mg IVP in ED with resultant conversion to junctional bradycardia 30's and hypotension in the 60's.  - converted to SR to 60's for short period now Afib rosalba 50's.   -not a long term candidate for AC as she's high risk fall  - family considering DCCV if she goes back into afib with RVR with knowledge she would need sedation and 30 days of AC post procedure, not recommending at this juncture, severe LAE she would not stay in SR long

## 2019-09-21 NOTE — HOSPITAL COURSE
Admitted to hospital medicine with acute NSTEMI and acute decompensated systolic heart failure.  She was found in the ED in a junctional bradycardia that has since transitioned to a slow Afib 30 - 50's, hypotensive systolic 60's with adequate mentation and converstation. She was started on ACS protocol with heparin gtt, plavix and ASA load, now daily. Her troponins have peaked > 50K.  Echo on admit, EF unchanged 30%, Local WMA, Grade II LV DDx, LVH, Mod severe AS 0.65/ MG 28, low stroke volume 30 cc/m2, Anteriorly directed MR, Normal RV fxn, PAP 77mmHg, CVP 8, severe LAE.  She is chest pain free and hemodynamically stable.  PT/OT feels she's at her baseline.  She uses a walker at home.  CR bennett to 1.6 from 1.3 and has stabilized, most likely d/t acute event on Friday when she became severely hypotensive and continues to be severely bradycardic.  Her lungs have cleared up, she is off oxygen and is conversant without dyspnea.  Her pressures have improved to the 130's coupled with severe MR, she was started on a small dose of hydralazine 10mg bid, after adding back her triamterene /hctz since she continued to have pitting peripheral edema. Her pressure seemed to be able to tolerate however dropped to the 90's.  Will send home with prn diuretic dose and continue the Hydralazine 10mg bid.      E coli 1/4 bottles.  She was given 3 days of IV zosyn and transitioned to to augmentin 500mg po bid for 10 days to complete at home.  Family has decided on [facility] home hospice.  If she declines in health d/t this event, they are prepared to not bring her back to the hospital.     Dispo: Home with home hospice

## 2019-09-21 NOTE — PLAN OF CARE
Problem: Occupational Therapy Goal  Goal: Occupational Therapy Goal  Goals to be met by: 9/29/19     Patient will increase functional independence with ADLs by performing:    UE Dressing with Supervision.  LE Dressing with Supervision.  Grooming while standing with Supervision.  Toileting from toilet with Contact Guard Assistance for hygiene and clothing management.   Rolling to Bilateral with Supervision.   Supine to sit with Supervision.  Step transfer with Contact Guard Assistance using RW.  Toilet transfer to toilet with Contact Guard Assistance using RW.    Outcome: Ongoing (interventions implemented as appropriate)  Evaluated pt and established OT POC.  Connie Barillas OT  9/21/2019

## 2019-09-22 PROBLEM — R78.81 BACTEREMIA DUE TO GRAM-NEGATIVE BACTERIA: Status: ACTIVE | Noted: 2019-09-22

## 2019-09-22 LAB
ALBUMIN SERPL BCP-MCNC: 3.4 G/DL (ref 3.5–5.2)
ALP SERPL-CCNC: 69 U/L (ref 55–135)
ALT SERPL W/O P-5'-P-CCNC: 27 U/L (ref 10–44)
ANION GAP SERPL CALC-SCNC: 19 MMOL/L (ref 8–16)
APTT BLDCRRT: 41.9 SEC (ref 21–32)
AST SERPL-CCNC: 78 U/L (ref 10–40)
BASOPHILS # BLD AUTO: 0.05 K/UL (ref 0–0.2)
BASOPHILS NFR BLD: 1 % (ref 0–1.9)
BILIRUB SERPL-MCNC: 0.3 MG/DL (ref 0.1–1)
BUN SERPL-MCNC: 47 MG/DL (ref 10–30)
CALCIUM SERPL-MCNC: 9.3 MG/DL (ref 8.7–10.5)
CHLORIDE SERPL-SCNC: 109 MMOL/L (ref 95–110)
CO2 SERPL-SCNC: 14 MMOL/L (ref 23–29)
CREAT SERPL-MCNC: 1.6 MG/DL (ref 0.5–1.4)
DIFFERENTIAL METHOD: ABNORMAL
EOSINOPHIL # BLD AUTO: 0.1 K/UL (ref 0–0.5)
EOSINOPHIL NFR BLD: 2.2 % (ref 0–8)
ERYTHROCYTE [DISTWIDTH] IN BLOOD BY AUTOMATED COUNT: 15.2 % (ref 11.5–14.5)
EST. GFR  (AFRICAN AMERICAN): 31.3 ML/MIN/1.73 M^2
EST. GFR  (NON AFRICAN AMERICAN): 27.2 ML/MIN/1.73 M^2
GLUCOSE SERPL-MCNC: 85 MG/DL (ref 70–110)
HCT VFR BLD AUTO: 35 % (ref 37–48.5)
HGB BLD-MCNC: 10.7 G/DL (ref 12–16)
IMM GRANULOCYTES # BLD AUTO: 0.01 K/UL (ref 0–0.04)
IMM GRANULOCYTES NFR BLD AUTO: 0.2 % (ref 0–0.5)
LYMPHOCYTES # BLD AUTO: 1.2 K/UL (ref 1–4.8)
LYMPHOCYTES NFR BLD: 23.5 % (ref 18–48)
MAGNESIUM SERPL-MCNC: 2.1 MG/DL (ref 1.6–2.6)
MCH RBC QN AUTO: 32.1 PG (ref 27–31)
MCHC RBC AUTO-ENTMCNC: 30.6 G/DL (ref 32–36)
MCV RBC AUTO: 105 FL (ref 82–98)
MONOCYTES # BLD AUTO: 0.4 K/UL (ref 0.3–1)
MONOCYTES NFR BLD: 9 % (ref 4–15)
NEUTROPHILS # BLD AUTO: 3.1 K/UL (ref 1.8–7.7)
NEUTROPHILS NFR BLD: 64.1 % (ref 38–73)
NRBC BLD-RTO: 0 /100 WBC
PLATELET # BLD AUTO: 160 K/UL (ref 150–350)
PMV BLD AUTO: 10.9 FL (ref 9.2–12.9)
POTASSIUM SERPL-SCNC: 3.9 MMOL/L (ref 3.5–5.1)
PROT SERPL-MCNC: 6.4 G/DL (ref 6–8.4)
RBC # BLD AUTO: 3.33 M/UL (ref 4–5.4)
SODIUM SERPL-SCNC: 142 MMOL/L (ref 136–145)
WBC # BLD AUTO: 4.9 K/UL (ref 3.9–12.7)

## 2019-09-22 PROCEDURE — 36415 COLL VENOUS BLD VENIPUNCTURE: CPT

## 2019-09-22 PROCEDURE — 83735 ASSAY OF MAGNESIUM: CPT

## 2019-09-22 PROCEDURE — 25000003 PHARM REV CODE 250: Performed by: STUDENT IN AN ORGANIZED HEALTH CARE EDUCATION/TRAINING PROGRAM

## 2019-09-22 PROCEDURE — 20600001 HC STEP DOWN PRIVATE ROOM

## 2019-09-22 PROCEDURE — 85730 THROMBOPLASTIN TIME PARTIAL: CPT

## 2019-09-22 PROCEDURE — 99232 PR SUBSEQUENT HOSPITAL CARE,LEVL II: ICD-10-PCS | Mod: ,,, | Performed by: NURSE PRACTITIONER

## 2019-09-22 PROCEDURE — 80053 COMPREHEN METABOLIC PANEL: CPT

## 2019-09-22 PROCEDURE — 99232 SBSQ HOSP IP/OBS MODERATE 35: CPT | Mod: ,,, | Performed by: NURSE PRACTITIONER

## 2019-09-22 PROCEDURE — 25000003 PHARM REV CODE 250: Performed by: HOSPITALIST

## 2019-09-22 PROCEDURE — 63600175 PHARM REV CODE 636 W HCPCS: Performed by: HOSPITALIST

## 2019-09-22 PROCEDURE — 25000003 PHARM REV CODE 250: Performed by: NURSE PRACTITIONER

## 2019-09-22 PROCEDURE — 85025 COMPLETE CBC W/AUTO DIFF WBC: CPT

## 2019-09-22 RX ORDER — POTASSIUM CHLORIDE 750 MG/1
10 CAPSULE, EXTENDED RELEASE ORAL ONCE
Status: COMPLETED | OUTPATIENT
Start: 2019-09-22 | End: 2019-09-22

## 2019-09-22 RX ORDER — HYDRALAZINE HYDROCHLORIDE 10 MG/1
10 TABLET, FILM COATED ORAL EVERY 12 HOURS
Status: DISCONTINUED | OUTPATIENT
Start: 2019-09-22 | End: 2019-09-23 | Stop reason: HOSPADM

## 2019-09-22 RX ADMIN — HYDRALAZINE HYDROCHLORIDE 10 MG: 10 TABLET, FILM COATED ORAL at 09:09

## 2019-09-22 RX ADMIN — DOCUSATE SODIUM 100 MG: 100 CAPSULE, LIQUID FILLED ORAL at 09:09

## 2019-09-22 RX ADMIN — LOVASTATIN 20 MG: 20 TABLET ORAL at 09:09

## 2019-09-22 RX ADMIN — VITAMIN D, TAB 1000IU (100/BT) 1000 UNITS: 25 TAB at 09:09

## 2019-09-22 RX ADMIN — CILOSTAZOL 100 MG: 50 TABLET ORAL at 09:09

## 2019-09-22 RX ADMIN — RAMELTEON 8 MG: 8 TABLET ORAL at 09:09

## 2019-09-22 RX ADMIN — CLOPIDOGREL BISULFATE 75 MG: 75 TABLET ORAL at 09:09

## 2019-09-22 RX ADMIN — POTASSIUM CHLORIDE 10 MEQ: 750 CAPSULE, EXTENDED RELEASE ORAL at 09:09

## 2019-09-22 RX ADMIN — MEMANTINE HYDROCHLORIDE 5 MG: 5 TABLET ORAL at 09:09

## 2019-09-22 RX ADMIN — PIPERACILLIN AND TAZOBACTAM 4.5 G: 4; .5 INJECTION, POWDER, LYOPHILIZED, FOR SOLUTION INTRAVENOUS; PARENTERAL at 09:09

## 2019-09-22 RX ADMIN — ACETAMINOPHEN 650 MG: 325 TABLET ORAL at 09:09

## 2019-09-22 RX ADMIN — POLYETHYLENE GLYCOL 3350 17 G: 17 POWDER, FOR SOLUTION ORAL at 09:09

## 2019-09-22 RX ADMIN — FERROUS SULFATE TAB EC 325 MG (65 MG FE EQUIVALENT) 325 MG: 325 (65 FE) TABLET DELAYED RESPONSE at 09:09

## 2019-09-22 RX ADMIN — ASPIRIN 81 MG: 81 TABLET, COATED ORAL at 09:09

## 2019-09-22 RX ADMIN — LEVOTHYROXINE SODIUM 88 MCG: 88 TABLET ORAL at 05:09

## 2019-09-22 NOTE — ASSESSMENT & PLAN NOTE
- troponin has trended 1.3 to > 50 K  - ACS protocol initiated for medical management, heparin gtt, ASA and plavix loaded, lipid panel normal so will continue with home mevacor.   - Supportive care, no aggressive measures per family  - DNR/ DNI, considering hospice upon discharge  - HDS, sbp trending upwards to 120's, HR still 40-50's Afib   - no acute events on the monitor   - will add low dose hydralazine as her bun and CR have been slowly trending up and unsure where she will peak.  Once she's stabilized less than 2.0 then can consider switching to ARB as tolerated   - PAP are much higher and BNP elevated.  She was given two doses of IV lasix with minimal response, she is now robustly urinating on her own, despite rising CR.  She has stabilized post cardizem dosing.  Lungs have also cleared.  Will consider sending home with PRN lasix as needed for worsening sob and peripheral edema.   - stop heparin gtt today

## 2019-09-22 NOTE — PLAN OF CARE
Plan of care discussed with patient. Patient is free of fall/trauma/injury. Denies CP, SOB, or pain/discomfort. Heparin gtt d/c per order. IV Zosyn administered. Plan for pt to D/C to hospice tomorrow. All questions addressed. Will continue to monitor.

## 2019-09-22 NOTE — PLAN OF CARE
Problem: Adult Inpatient Plan of Care  Goal: Plan of Care Review  Outcome: Revised  Pt free of falls/trauma/injuries.  Denies c/o SOB, CP, or discomfort.  Generalized skin remains CDI; No edema noted.  Pt remains on Heparin and ABX gtts.   Pt tolerating plan of care.

## 2019-09-22 NOTE — ASSESSMENT & PLAN NOTE
- moderate/ severe, adding low dose afterload reduction, hydralazine 10mg bid, will increase to tid as tolerating, now that b/p's are high enough to tolerate

## 2019-09-22 NOTE — SUBJECTIVE & OBJECTIVE
Interval History: She continues to eat all her meals plus boost. She is conversant without dyspnea, she is HDS and without CP, LH or dizzy.     Review of Systems   Constitutional: Negative for activity change (resolved), appetite change (resolved) and fatigue (resolved).   HENT: Negative for congestion and trouble swallowing.    Eyes: Negative.    Respiratory: Negative for cough, chest tightness and shortness of breath.    Cardiovascular: Positive for leg swelling. Negative for chest pain and palpitations.   Gastrointestinal: Negative for abdominal distention, abdominal pain, blood in stool, constipation, diarrhea, nausea and vomiting.   Endocrine: Negative for cold intolerance and heat intolerance.   Genitourinary: Negative for dysuria, frequency, hematuria and urgency.   Musculoskeletal: Negative for arthralgias, back pain, myalgias and neck pain.   Skin: Negative for color change, pallor and rash.   Allergic/Immunologic: Negative for immunocompromised state.   Neurological: Positive for weakness. Negative for dizziness, tremors, syncope, light-headedness, numbness and headaches.   Hematological: Does not bruise/bleed easily.   Psychiatric/Behavioral: Positive for confusion (Dementia). Negative for agitation and behavioral problems. The patient is not nervous/anxious.         Dementia       Objective:     Vital Signs (Most Recent):  Temp: 97.3 °F (36.3 °C) (09/22/19 1127)  Pulse: (!) 55 (09/22/19 1127)  Resp: 16 (09/22/19 1127)  BP: (!) 93/50 (09/22/19 1127)  SpO2: (!) 93 % (09/22/19 1127) Vital Signs (24h Range):  Temp:  [97.3 °F (36.3 °C)-98.1 °F (36.7 °C)] 97.3 °F (36.3 °C)  Pulse:  [46-99] 55  Resp:  [16-18] 16  SpO2:  [91 %-98 %] 93 %  BP: ()/(50-71) 93/50     Weight: 47.2 kg (104 lb)  Body mass index is 20.31 kg/m².    Intake/Output Summary (Last 24 hours) at 9/22/2019 1138  Last data filed at 9/22/2019 0800  Gross per 24 hour   Intake 1140 ml   Output 950 ml   Net 190 ml      Physical Exam    Constitutional: She appears well-developed and well-nourished. No distress.   HENT:   Head: Normocephalic and atraumatic.   Right Ear: External ear normal.   Left Ear: External ear normal.   Nose: Nose normal.   Mouth/Throat: Oropharynx is clear and moist.   Eyes: Conjunctivae and EOM are normal. Right eye exhibits no discharge. Left eye exhibits no discharge. No scleral icterus.   Neck: Normal range of motion. Neck supple. Hepatojugular reflux present. No JVD present. No tracheal deviation present. No thyromegaly present.   Cardiovascular: Normal rate, regular rhythm and intact distal pulses. Exam reveals no gallop and no friction rub.   Murmur (AS tight harsh high pitched, Mitral loud blowing systolic) heard.  Pulmonary/Chest: Effort normal. No respiratory distress. She has no wheezes. She has no rales (resolved).   Abdominal: Soft. Bowel sounds are normal. She exhibits no distension. There is no tenderness. There is no guarding.   Musculoskeletal: Normal range of motion. She exhibits edema (bilateral ankle +2). She exhibits no tenderness.   Neurological: She is alert. No cranial nerve deficit or sensory deficit. She exhibits normal muscle tone. Coordination normal.   Skin: Skin is warm and dry. No rash noted. No erythema.   Psychiatric: She has a normal mood and affect. Her behavior is normal. Her speech is tangential. Cognition and memory are impaired. She expresses inappropriate judgment.   [place], dementia/ confused, pleasant       Nursing note and vitals reviewed.      Significant Labs:   A1C:   Recent Labs   Lab 09/20/19  0656 09/20/19  1147   HGBA1C 4.9 5.1     Blood Culture:   Recent Labs   Lab 09/20/19  1319 09/20/19  1320 09/21/19  0711   LABBLOO No Growth to date  No Growth to date Gram stain jarad bottle: Gram negative rods   Results called to and read back by:Marlene Greenfield LPN 09/21/2019  06:16  GRAM NEGATIVE KYLAH  Identification and susceptibility pending  * No Growth to date  No Growth to date  No  Growth to date  No Growth to date     CMP:   Recent Labs   Lab 09/21/19  0340 09/22/19  0503    142   K 3.9 3.9   * 109   CO2 20* 14*   GLU 77 85   BUN 40* 47*   CREATININE 1.5* 1.6*   CALCIUM 9.3 9.3   PROT 6.1 6.4   ALBUMIN 3.5 3.4*   BILITOT 0.3 0.3   ALKPHOS 68 69   * 78*   ALT 36 27   ANIONGAP 9 19*   EGFRNONAA 29.4* 27.2*      Magnesium:   Recent Labs   Lab 09/21/19  0340 09/22/19  0503   MG 2.0 2.1     Troponin:   Recent Labs   Lab 09/20/19  1319 09/20/19  1722 09/21/19  0819   TROPONINI 12.472* 46.670* >50.000*     TSH:   Recent Labs   Lab 09/20/19  0656   TSH 2.581       Significant Imaging: Echo: I have reviewed all pertinent results/findings within the past 24 hours and my personal findings are:  as noted above

## 2019-09-22 NOTE — ASSESSMENT & PLAN NOTE
- given dose of diltiazem 10mg IVP in ED with resultant conversion to junctional bradycardia 30's and hypotension in the 60's.  - converted to SR to 60's for short period now Afib rosalba 40-50's.   -not a long term candidate for AC as she's high risk fall  - family considering DCCV if she goes back into afib with RVR with knowledge she would need sedation and 30 days of AC post procedure, not recommending at this juncture, severe LAE she would not stay in SR long, they understand

## 2019-09-22 NOTE — PROGRESS NOTES
[facility] Hospital Medicine  Progress Note    Patient Name: Jayda Rendon  MRN: [id number]  Patient Class: IP- Inpatient   Admission Date: 9/20/2019  Length of Stay: 2 days  Attending Physician: Ayleen Lucia MD  Primary Care Provider: Dolly Grijalva MD    [facility] Medicine Team: [facility] KINDRA MED DANIEL Fay NP    Subjective:     Principal Problem:NSTEMI (non-ST elevated myocardial infarction)        HPI:  89 y/o F with PMH of severe AS 0.8 MG 32, non-rheumatic Mod/ Severe MR, Phtn 49mmHg, HFrEF 30%, CVP 8 (8/19), HTN, hypothyroidism, malignant melanoma, CAD and ICM who presents with CP, diaphoresis and acute weakness.  She was found to have Afib with RVR upon presentation to the ED, at which time they gave her a dose of IV diltiazem and IV furosemide which converted her into a junctional rhythm of 30 bpm and dropped her pressure into the 60s'.  She was found mentating at her normal but confused / dementia baseline.  She has three providers (two nephews and a niece) who are her decision makers. She has an extensive extended family.  She lives with her 98 y/o sister and two nephews.   The family noticed that she was taking longer in the bathroom this morning. They went inside the bathroom and noticed that she was pale and diaphoretic. She reported CP, so she was given a SL NTG 0.3 in the ambulance. EMS noted SBP in the 60s. She was brought to [facility] ER and given 2L NS,  mg, and IV diltiazem 10 mg for AF with RVR.   In the ED, Hgb 10.4, BNP 2426, troponin 1.3, and CXR c/w pulmonary edema. Her EKG is c/w inferior non-specific ST changes. In August, her ECHO demonstrated an EF of 30%, LAD territory WMA c/w prior large infarction, VINEET 0.8, peak velocity 3.9, mean gradient 32, and moderate to severe MR.     She is a known DNR/ DNI.  She has been steadily declining over the past month, with a 7 lbs weight loss d/t poor appetite/ early satiety.  She has been sleeping more though she is still able  to get around using her walker.  She does not want aggressive measures or procedures and the family is agreeble, however they would like her admitted for management of her acute MI.     Overview/Hospital Course:  Admitted to hospital medicine with acute NSTEMI and acute decompensated systolic heart failure.  She was found in the ED in a junctional bradycardia that has since transitioned to a slow Afib 30 - 50's, hypotensive systolic 60's with adequate mentation and converstation. She was started on ACS protocol with heparin gtt, plavix and ASA load, now daily. Her troponins have peaked > 50K.  Echo on admit, EF unchanged 30%, Local WMA, Grade II LV DDx, LVH, Mod severe AS 0.65/ MG 28, low stroke volume 30 cc/m2, Anteriorly directed MR, Normal RV fxn, PAP 77mmHg, CVP 8, severe LAE.  She is chest pain free and hemodynamically stable.  PT/OT feels she's at her baseline.  She uses a walker at home.  CR continues to rise most likely d/t acute event on Friday which she became severely hypotensive and continues to be severely bradycardic.  Her lungs have cleared up, she is off oxygen and is conversant without dyspnea.  Her pressures have improved to the 120's and she has severe MR, so will trial a small dose of hydralazine 10mg bid and see how she tolerates.      Dispo: micro still pending, family is in discussion of Hospice, Palliative care saw Friday and TYLER Meléndez to speak with them today and possibly get one on board.      Interval History: She continues to eat all her meals plus boost. She is conversant without dyspnea, she is HDS and without CP, LH or dizzy.     Review of Systems   Constitutional: Negative for activity change (resolved), appetite change (resolved) and fatigue (resolved).   HENT: Negative for congestion and trouble swallowing.    Eyes: Negative.    Respiratory: Negative for cough, chest tightness and shortness of breath.    Cardiovascular: Positive for leg swelling. Negative for chest pain and  palpitations.   Gastrointestinal: Negative for abdominal distention, abdominal pain, blood in stool, constipation, diarrhea, nausea and vomiting.   Endocrine: Negative for cold intolerance and heat intolerance.   Genitourinary: Negative for dysuria, frequency, hematuria and urgency.   Musculoskeletal: Negative for arthralgias, back pain, myalgias and neck pain.   Skin: Negative for color change, pallor and rash.   Allergic/Immunologic: Negative for immunocompromised state.   Neurological: Positive for weakness. Negative for dizziness, tremors, syncope, light-headedness, numbness and headaches.   Hematological: Does not bruise/bleed easily.   Psychiatric/Behavioral: Positive for confusion (Dementia). Negative for agitation and behavioral problems. The patient is not nervous/anxious.         Dementia       Objective:     Vital Signs (Most Recent):  Temp: 97.3 °F (36.3 °C) (09/22/19 1127)  Pulse: (!) 55 (09/22/19 1127)  Resp: 16 (09/22/19 1127)  BP: (!) 93/50 (09/22/19 1127)  SpO2: (!) 93 % (09/22/19 1127) Vital Signs (24h Range):  Temp:  [97.3 °F (36.3 °C)-98.1 °F (36.7 °C)] 97.3 °F (36.3 °C)  Pulse:  [46-99] 55  Resp:  [16-18] 16  SpO2:  [91 %-98 %] 93 %  BP: ()/(50-71) 93/50     Weight: 47.2 kg (104 lb)  Body mass index is 20.31 kg/m².    Intake/Output Summary (Last 24 hours) at 9/22/2019 1138  Last data filed at 9/22/2019 0800  Gross per 24 hour   Intake 1140 ml   Output 950 ml   Net 190 ml      Physical Exam   Constitutional: She appears well-developed and well-nourished. No distress.   HENT:   Head: Normocephalic and atraumatic.   Right Ear: External ear normal.   Left Ear: External ear normal.   Nose: Nose normal.   Mouth/Throat: Oropharynx is clear and moist.   Eyes: Conjunctivae and EOM are normal. Right eye exhibits no discharge. Left eye exhibits no discharge. No scleral icterus.   Neck: Normal range of motion. Neck supple. Hepatojugular reflux present. No JVD present. No tracheal deviation present. No  thyromegaly present.   Cardiovascular: Normal rate, regular rhythm and intact distal pulses. Exam reveals no gallop and no friction rub.   Murmur (AS tight harsh high pitched, Mitral loud blowing systolic) heard.  Pulmonary/Chest: Effort normal. No respiratory distress. She has no wheezes. She has no rales (resolved).   Abdominal: Soft. Bowel sounds are normal. She exhibits no distension. There is no tenderness. There is no guarding.   Musculoskeletal: Normal range of motion. She exhibits edema (bilateral ankle +2). She exhibits no tenderness.   Neurological: She is alert. No cranial nerve deficit or sensory deficit. She exhibits normal muscle tone. Coordination normal.   Skin: Skin is warm and dry. No rash noted. No erythema.   Psychiatric: She has a normal mood and affect. Her behavior is normal. Her speech is tangential. Cognition and memory are impaired. She expresses inappropriate judgment.   [place], dementia/ confused, pleasant       Nursing note and vitals reviewed.      Significant Labs:   A1C:   Recent Labs   Lab 09/20/19  0656 09/20/19  1147   HGBA1C 4.9 5.1     Blood Culture:   Recent Labs   Lab 09/20/19  1319 09/20/19  1320 09/21/19  0711   LABBLOO No Growth to date  No Growth to date Gram stain jarad bottle: Gram negative rods   Results called to and read back by:Marlene Greenfield LPN 09/21/2019  06:16  GRAM NEGATIVE KYLAH  Identification and susceptibility pending  * No Growth to date  No Growth to date  No Growth to date  No Growth to date     CMP:   Recent Labs   Lab 09/21/19  0340 09/22/19  0503    142   K 3.9 3.9   * 109   CO2 20* 14*   GLU 77 85   BUN 40* 47*   CREATININE 1.5* 1.6*   CALCIUM 9.3 9.3   PROT 6.1 6.4   ALBUMIN 3.5 3.4*   BILITOT 0.3 0.3   ALKPHOS 68 69   * 78*   ALT 36 27   ANIONGAP 9 19*   EGFRNONAA 29.4* 27.2*      Magnesium:   Recent Labs   Lab 09/21/19  0340 09/22/19  0503   MG 2.0 2.1     Troponin:   Recent Labs   Lab 09/20/19  1319 09/20/19  1722 09/21/19  0819    TROPONINI 12.472* 46.670* >50.000*     TSH:   Recent Labs   Lab 09/20/19  0656   TSH 2.581       Significant Imaging: Echo: I have reviewed all pertinent results/findings within the past 24 hours and my personal findings are:  as noted above      Assessment/Plan:      * NSTEMI (non-ST elevated myocardial infarction)  - troponin has trended 1.3 to > 50 K  - ACS protocol initiated for medical management, heparin gtt, ASA and plavix loaded, lipid panel normal so will continue with home mevacor.   - Supportive care, no aggressive measures per family  - DNR/ DNI, considering hospice upon discharge  - HDS, sbp trending upwards to 120's, HR still 40-50's Afib   - no acute events on the monitor   - will add low dose hydralazine as her bun and CR have been slowly trending up and unsure where she will peak.  Once she's stabilized less than 2.0 then can consider switching to ARB as tolerated   - PAP are much higher and BNP elevated.  She was given two doses of IV lasix with minimal response, she is now robustly urinating on her own, despite rising CR.  She has stabilized post cardizem dosing.  Lungs have also cleared.  Will consider sending home with PRN lasix as needed for worsening sob and peripheral edema.   - stop heparin gtt today     Paroxysmal atrial fibrillation  - given dose of diltiazem 10mg IVP in ED with resultant conversion to junctional bradycardia 30's and hypotension in the 60's.  - converted to SR to 60's for short period now Afib rosalba 40-50's.   -not a long term candidate for AC as she's high risk fall  - family considering DCCV if she goes back into afib with RVR with knowledge she would need sedation and 30 days of AC post procedure, not recommending at this juncture, severe LAE she would not stay in SR long, they understand      Palliative care encounter  - discussing hospice   - DNR/ DNI paperwork on chart      Acute on chronic systolic heart failure  - see above       Acute decompensated heart  failure  - secondary to ischemic CM  - supportive care as tolerated  - attempted diuresis as Echo showed CVP 8 and CXR with pulm edema; CR now rising, most likely d/t acute event and not lasix.  She is stable right now, and is autodiuresing.  NO need to push diuresis today as her CR continues to rise from acute incident   - will resume GDMT as soon as HDS       Mitral regurgitation  - moderate/ severe, adding low dose afterload reduction, hydralazine 10mg bid, will increase to tid as tolerating, now that b/p's are high enough to tolerate      Nonrheumatic aortic valve stenosis  - VINEET 0.6 and MG 28, EF 30%      Hypothyroidism  - TSH wnl        Hyperlipidemia  - lipid panel adequate on mevacor        VTE Risk Mitigation (From admission, onward)         Ordered     IP VTE HIGH RISK PATIENT  Once      09/20/19 1121     Reason for no Mechanical VTE Prophylaxis  Once      09/20/19 1121                      Mckenzie Fay NP  Department of Hospital Medicine   [facility]

## 2019-09-22 NOTE — ASSESSMENT & PLAN NOTE
- secondary to ischemic CM  - supportive care as tolerated  - attempted diuresis as Echo showed CVP 8 and CXR with pulm edema; CR now rising, most likely d/t acute event and not lasix.  She is stable right now, and is autodiuresing.  NO need to push diuresis today as her CR continues to rise from acute incident   - will resume GDMT as soon as HDS

## 2019-09-22 NOTE — PLAN OF CARE
CM provided information and choices of different hospices.  CM explained what hospice provides, answered questions from several family members.  Family stated that they would speak with other members also, and make a choice.  CM let them know that she was here until 4:30 pm, gave timo link, in the event, they make a decision or have any more questions.  Provided emotional support to family.  CM explained that a  and a  would be there tomorrow also.

## 2019-09-23 VITALS
OXYGEN SATURATION: 96 % | WEIGHT: 106.69 LBS | HEART RATE: 60 BPM | DIASTOLIC BLOOD PRESSURE: 68 MMHG | TEMPERATURE: 98 F | BODY MASS INDEX: 20.94 KG/M2 | HEIGHT: 60 IN | RESPIRATION RATE: 18 BRPM | SYSTOLIC BLOOD PRESSURE: 119 MMHG

## 2019-09-23 PROBLEM — B96.20 E COLI BACTEREMIA: Status: ACTIVE | Noted: 2019-09-22

## 2019-09-23 PROBLEM — Z51.5 ENCOUNTER FOR PALLIATIVE CARE IN HOME HOSPICE: Status: ACTIVE | Noted: 2019-09-23

## 2019-09-23 LAB
ALBUMIN SERPL BCP-MCNC: 3.1 G/DL (ref 3.5–5.2)
ALP SERPL-CCNC: 61 U/L (ref 55–135)
ALT SERPL W/O P-5'-P-CCNC: 21 U/L (ref 10–44)
ANION GAP SERPL CALC-SCNC: 9 MMOL/L (ref 8–16)
AST SERPL-CCNC: 38 U/L (ref 10–40)
BACTERIA BLD CULT: ABNORMAL
BILIRUB SERPL-MCNC: 0.3 MG/DL (ref 0.1–1)
BUN SERPL-MCNC: 52 MG/DL (ref 10–30)
CALCIUM SERPL-MCNC: 9.3 MG/DL (ref 8.7–10.5)
CHLORIDE SERPL-SCNC: 108 MMOL/L (ref 95–110)
CO2 SERPL-SCNC: 21 MMOL/L (ref 23–29)
CREAT SERPL-MCNC: 1.6 MG/DL (ref 0.5–1.4)
EST. GFR  (AFRICAN AMERICAN): 31.3 ML/MIN/1.73 M^2
EST. GFR  (NON AFRICAN AMERICAN): 27.2 ML/MIN/1.73 M^2
GLUCOSE SERPL-MCNC: 89 MG/DL (ref 70–110)
MAGNESIUM SERPL-MCNC: 2 MG/DL (ref 1.6–2.6)
POTASSIUM SERPL-SCNC: 4.5 MMOL/L (ref 3.5–5.1)
PROT SERPL-MCNC: 5.9 G/DL (ref 6–8.4)
SODIUM SERPL-SCNC: 138 MMOL/L (ref 136–145)

## 2019-09-23 PROCEDURE — 94761 N-INVAS EAR/PLS OXIMETRY MLT: CPT

## 2019-09-23 PROCEDURE — 36415 COLL VENOUS BLD VENIPUNCTURE: CPT

## 2019-09-23 PROCEDURE — 63600175 PHARM REV CODE 636 W HCPCS: Performed by: HOSPITALIST

## 2019-09-23 PROCEDURE — 99239 PR HOSPITAL DISCHARGE DAY,>30 MIN: ICD-10-PCS | Mod: ,,, | Performed by: NURSE PRACTITIONER

## 2019-09-23 PROCEDURE — 99233 PR SUBSEQUENT HOSPITAL CARE,LEVL III: ICD-10-PCS | Mod: ,,, | Performed by: CLINICAL NURSE SPECIALIST

## 2019-09-23 PROCEDURE — 99239 HOSP IP/OBS DSCHRG MGMT >30: CPT | Mod: ,,, | Performed by: NURSE PRACTITIONER

## 2019-09-23 PROCEDURE — 25000003 PHARM REV CODE 250: Performed by: NURSE PRACTITIONER

## 2019-09-23 PROCEDURE — 63600175 PHARM REV CODE 636 W HCPCS: Performed by: NURSE PRACTITIONER

## 2019-09-23 PROCEDURE — 25000003 PHARM REV CODE 250: Performed by: HOSPITALIST

## 2019-09-23 PROCEDURE — 99233 SBSQ HOSP IP/OBS HIGH 50: CPT | Mod: ,,, | Performed by: CLINICAL NURSE SPECIALIST

## 2019-09-23 PROCEDURE — 80053 COMPREHEN METABOLIC PANEL: CPT

## 2019-09-23 PROCEDURE — 83735 ASSAY OF MAGNESIUM: CPT

## 2019-09-23 RX ORDER — AMOXICILLIN AND CLAVULANATE POTASSIUM 500; 125 MG/1; MG/1
1 TABLET, FILM COATED ORAL 2 TIMES DAILY
Qty: 20 TABLET | Refills: 0 | Status: SHIPPED | OUTPATIENT
Start: 2019-09-24 | End: 2019-09-23 | Stop reason: HOSPADM

## 2019-09-23 RX ORDER — HYDRALAZINE HYDROCHLORIDE 10 MG/1
5 TABLET, FILM COATED ORAL EVERY 12 HOURS
Qty: 90 TABLET | Refills: 0 | Status: SHIPPED | OUTPATIENT
Start: 2019-09-23 | End: 2019-09-23

## 2019-09-23 RX ORDER — HYDRALAZINE HYDROCHLORIDE 10 MG/1
10 TABLET, FILM COATED ORAL EVERY 12 HOURS
Qty: 90 TABLET | Refills: 0 | Status: SHIPPED | OUTPATIENT
Start: 2019-09-24 | End: 2020-09-23

## 2019-09-23 RX ORDER — TRIAMTERENE AND HYDROCHLOROTHIAZIDE 37.5; 25 MG/1; MG/1
1 CAPSULE ORAL DAILY
Status: DISCONTINUED | OUTPATIENT
Start: 2019-09-23 | End: 2019-09-23 | Stop reason: HOSPADM

## 2019-09-23 RX ORDER — CEFPODOXIME PROXETIL 200 MG/1
400 TABLET, FILM COATED ORAL DAILY
Qty: 11 TABLET | Refills: 0 | Status: SHIPPED | OUTPATIENT
Start: 2019-09-24

## 2019-09-23 RX ORDER — TRIAMTERENE/HYDROCHLOROTHIAZID 37.5-25 MG
TABLET ORAL
Qty: 30 TABLET | Refills: 11 | Status: SHIPPED | OUTPATIENT
Start: 2019-09-23

## 2019-09-23 RX ADMIN — POLYETHYLENE GLYCOL 3350 17 G: 17 POWDER, FOR SOLUTION ORAL at 08:09

## 2019-09-23 RX ADMIN — LEVOTHYROXINE SODIUM 88 MCG: 88 TABLET ORAL at 05:09

## 2019-09-23 RX ADMIN — ACETAMINOPHEN 650 MG: 325 TABLET ORAL at 08:09

## 2019-09-23 RX ADMIN — CILOSTAZOL 100 MG: 50 TABLET ORAL at 08:09

## 2019-09-23 RX ADMIN — HYDRALAZINE HYDROCHLORIDE 10 MG: 10 TABLET, FILM COATED ORAL at 08:09

## 2019-09-23 RX ADMIN — PIPERACILLIN AND TAZOBACTAM 4.5 G: 4; .5 INJECTION, POWDER, LYOPHILIZED, FOR SOLUTION INTRAVENOUS; PARENTERAL at 08:09

## 2019-09-23 RX ADMIN — DOCUSATE SODIUM 100 MG: 100 CAPSULE, LIQUID FILLED ORAL at 08:09

## 2019-09-23 RX ADMIN — VITAMIN D, TAB 1000IU (100/BT) 1000 UNITS: 25 TAB at 08:09

## 2019-09-23 RX ADMIN — CEFTRIAXONE SODIUM 2 G: 2 INJECTION, POWDER, FOR SOLUTION INTRAMUSCULAR; INTRAVENOUS at 04:09

## 2019-09-23 RX ADMIN — TRIAMTERENE AND HYDROCHLOROTHIAZIDE 1 CAPSULE: 25; 37.5 CAPSULE ORAL at 08:09

## 2019-09-23 RX ADMIN — ASPIRIN 81 MG: 81 TABLET, COATED ORAL at 08:09

## 2019-09-23 RX ADMIN — MEMANTINE HYDROCHLORIDE 5 MG: 5 TABLET ORAL at 08:09

## 2019-09-23 RX ADMIN — CLOPIDOGREL BISULFATE 75 MG: 75 TABLET ORAL at 08:09

## 2019-09-23 NOTE — PLAN OF CARE
Pt free of falls/trauma/injuries.  Denies c/o SOB, CP, or discomfort.  Generalized skin remains CDI; No edema noted.  Pt remains on  ABX gtts.   Poss d/c in AM with hospice. Pt tolerating plan of care.

## 2019-09-23 NOTE — PLAN OF CARE
Plan of care discussed with patient. Patient is free of fall/trauma/injury. Family discussing potential for hospice care. Seen by palliative care to answer all questions. Continues to be DNR, appropriate documentation in chart. FUENTESZ ordered and administered this AM. Purewick in place. ABX continued. Denies CP, SOB, or pain/discomfort. All questions addressed. Will continue to monitor

## 2019-09-23 NOTE — ASSESSMENT & PLAN NOTE
- s/p 3 days IV zosyn, for presumed bacteremia vs contaminate as she in the bathroom having a bm prior to admit  - will switch to augmenting for 10 more days upon discharge

## 2019-09-23 NOTE — PROGRESS NOTES
09/23/19 1110 09/23/19 1115   Vital Signs   BP (!) 82/48 (!) 93/50   MAP (mmHg) 61 67   BP Location Left arm Left arm   BP Method  --  Automatic   Patient Position Lying Lying     Notified NP Lanks that patient's BP dropped at afternoon checks. Patient had been napping prior to getting vitals. NP stated she will assess and make appropriate adjustments and to continue to monitor closely.

## 2019-09-23 NOTE — PLAN OF CARE
[facility]  Palliative Care   Psychosocial Assessment    Patient Name: Jayda Rendon  MRN: [id number]  Admission Date: 9/20/2019  Hospital Length of Stay: 3 days  Code Status: DNR   Attending Provider: Ayleen Lucia MD  Palliative Care Provider: NBA Sanchez, APRN, AGCNS  Primary Care Physician: Dolly Grijalva MD  Principal Problem:NSTEMI (non-ST elevated myocardial infarction)    Reason for Referral: hospice referral or discussion  Consult Order Date: 9/20/19  Primary CM/SW: Yanira Chacko LMSW    Present during Interview: patient and POA/Niece Patricia, 2 nephes and extended family.      Primary Language:English   Needed: no      Past Medical Situation:   PMH:   Past Medical History:   Diagnosis Date    Arthritis     Cancer     melanoma    Hyperlipidemia     Hyperparathyroidism     Hypertension     Joint pain     Peripheral vascular disease     worse on the right than the left    Thyroid disease     hypothyroidism     Mental Health/Substance Use History: n/a  Non-traditional Health practices:     Understanding of diagnosis and prognosis: Will benefit from continued compassionate direct information regarding prognosis.  Experience/Comfort level with health care system:    Patients Mental Status: Dementia- Pleasant    Socio-Economic Factors/Resources:  Address: [address] [place] [address]  Phone Number: [phone] (home)     Marital Status: Single  Household Composition: Lives with 97 year old sister Car  Children: none  Relationships with Family: Pt has never been  and has not children. Pt has 5 siblings but is closest with her oldest sister who is 97 years old Car, which whom she lives with.  Pt's niece Patricia and 2 nephews Raimundo and Ez have been caring for her and active in her life for many years.    Pt has support from large extended family as well.    Emergency Contacts:   Niece/POA: Patricia Michael: [phone]  Nephew/2POA: Raimundo Todd:  [phone]  Nephew/3POA: Ez Todd: [phone]    Activities of Daily Living: ambulates with Rolling walker. Able to perform some ADLs independently  Support Systems-Family & Community (Home Health, HME etc):  BSC, RW, Wheelchair    Transportation:  no    Work/Education History: Retired   History: no    Financial Resources:Medicare. BCBS      Advanced Care Planning & Legal Concerns:   Advanced Directives/Living Will: yes   Planning:  no    Power of : yes Niece Patricia as primary.  Surrogate Decision Maker:       Spirituality, Culture & Coping Mechanisms:  F- Jerilyn and Belief: Advent     I - Importance: Has strong jerilyn    C - Community/Culture Values:     A - Address in Care: Spiritual Care to follow. Anointed 19      Strengths/Coping Strategies: strong jerilyn. Supportive family  Self-Care Activities/Hobbies: enjoys family    Goals/Hopes/Expectations: wants to return home with family.  Fears/Anxiety/Concerns: Concerned about not being able to see pt's doctor.        Preferences about EOL Environment: (own bed, family nearby, pets, music, etc)  home    Complicated Bereavement Risk Assessment Tool (CBRAT)  Reference:  [facility] Clinical Practice Group (May 2016). Bereavement Risk Screening and Management Guidelines.  Retrieved from: http://www.grpcc.com.au/wp-content/uploads//LPTYP-Lfuqepejoto-Tbigzolst-and-Management-Guideline-2016.pdf      Bereaved Client Characteristics   ? Under 18      no  ? Was a Twin   no  ? Young Spouse   no  ? Elderly Spouse    no  ? Isolated    no  ? Lacks Meaningful Social Support   no  ? Dissatisfied with help available during illness   no  ? New to Financial [place] no  ? New to Decision-Making   no    Illness  ? Inherited Disorder   no  ? Stigmatized Disease in the family/community   no  ? Lengthy/Burdensome   no     Bereaved Client's History of Loss   ? Cumulative Multiple Losses   no  ? Previous  Mental Health Illnesses   no  ? Current Mental Health Illness   no  ? Other Significant Health Issues   no   ? Migrant/Refugee   no Death  ? Sudden or Unexpected   no  ? Traumatic Circumstances Associated with Death   no  ? Significant Cultural/Social Burdens as a result of Death   no   Relationship with   ? Profound Lifelong Partner   no  ? Highly Dependent    no  ? Antagonistic   no  ? Ambivalent   no  ? Deeply Connected   no  ? Culturally Defined   no   Risk Factors Scores  0-2  Low  3-5  Moderate  5+  High  All persons scoring moderate to high presume to be at risk**    (** It is acknowledged that protective factors and resilience may outweigh apparent risk factors.      Total Risk Factors Score:   Mild to Moderate    Increased. Pt has been living with her 97 yr old sister for extended period of time. Has no children. Very close with niece and nephews.     Will benefit from bereavement support.    Discharge Planning Needs/Plan of Care:     Visit to bedside with \[facility] [place] [facility]\"" Care APRN. Met with pt's POA/Niece Patricia, 2 nephews Raimundo and Ez, and extended family. Pt was awake and pleasantly confused.     Met with pt's family. Family verbalized understanding that pt has advanced heart failure and is hospice appropriate. Family had multiple questions regarding home hospice with were answered. Family hoping to speak with [facility] prior to discharge. Pt will be discharging back to sister and nephew's home.    Spoke with Primary  Yanira about sending referral to [facility]. Dr. Lucia and Jazlyn MONTEIRO aware.          Brandee Dockery, NATALY, [facility]

## 2019-09-23 NOTE — PROGRESS NOTES
"[facility]  Palliative Medicine  Progress Note    Patient Name: Jayda Rendon  MRN: [id number]  Admission Date: 9/20/2019  Hospital Length of Stay: 3 days  Code Status: DNR   Attending Provider: Ayleen Lucia MD  Consulting Provider: KRISH King  Primary Care Physician: Dolly Grijalva MD  Principal Problem:NSTEMI (non-ST elevated myocardial infarction)    Patient information was obtained from relative(s) and ER records.      Assessment/Plan:     Palliative care encounter  89-year-old female with frailty/debility, severe aortic stenosis, CHF, presenting in shock likely related to acute MI. Pt has hx of dementia. Pt is alert, oriented to person. Pt is [place]. No distress noted. Pt is a DNR.         Advance care planning:  -The patient has previously engaged in advance care planning  -Living will and HCPOA reviewed, both scanned into Epic  -living will:  Patient would want life-sustaining treatment if doctors think that would help but she would want it.  It would not want to have it of doctors do not think it would help  -Pt has appointed a HCPOA: Patricia Beardenpebetzaida  (daughter) [phone]    Estimated prognosis per Dr. Baker on 9-20-19  "-Time and potential for recovery:  Family describes the patient has had a significant decline in her overall health and function in the past month.  They say she has lost 7 lb in last month, has been sleeping all of the time, eating much less.  Given her age and multiple comorbidities it seems that she was showing multiple signs that she was approaching the end of her life and now with this new acute and severe illness, I suspect that she has a prognosis of weeks to months."    Goals of care:  Disease understanding:  Today: Met with pt's family. Patricia present. Family verbalized understanding that pt has  Advanced heart failure and is appropriate for hospice care.  Family had questions about home hospice care. Questions answered. Family verbalized understanding. " Family would like to meet with [facility] for home hospice. Pt will be discharging back home with her sister and two nephews.     Plan:   Family interested in meeting with [facility] for home hospice. Spoke to Dr. Lucia and CAYETANO Hobson with .  Will follow.         I will follow-up with patient. Please contact us if you have any additional questions.    Subjective:     Chief Complaint:   Chief Complaint   Patient presents with    Chest Pain     Patient woke up with chest pain and coldness. Family gave patient 1 nitro prior to EMS arrival. Patient has history of dementia.        HPI:   89 y/o female with PMH of severe AS, non-rheumatic MR, rEF, HTN, hypothyroidism, malignant melanoma, CAD and ICM who presents with CP. The patient lives with her 96 y/o sister and two nephews. The family noticed that she was taking longer in the bathroom this morning. They went inside the bathroom and noticed that she was pale and diaphoretic. She reported CP, so she was given a SL NTG with an effect that is unknown. The patient has baseline dementia according to family. They called EMS who noted a SBP in the 60s. She was brought to [facility] ER and given 2L NS,  mg, and IV diltiazem 10 mg. The IV diltiazem was given because she was in AF with RVR. In the ED, Hgb 10.4, BNP 2426, troponin 1.3, and CXR c/w pulmonary edema. Her EKG is c/w inferior non-specific ST changes. In August, her ECHO demonstrated an EF of 30%, LAD territory WMA c/w prior large infarction, VINEET 0.8, peak velocity 3.9, mean gradient 32, and moderate to severe MR. Her nephew is POA and states that she is DNR/DNI. Her nephews also state that they are weary of interventional procedures given her advanced directive.      Hospital Course:  No notes on file        Past Medical History:   Diagnosis Date    Arthritis     Cancer     melanoma    Hyperlipidemia     Hyperparathyroidism     Hypertension     Joint pain     Peripheral vascular disease      worse on the right than the left    Thyroid disease     hypothyroidism       Past Surgical History:   Procedure Laterality Date    APPENDECTOMY      Exam under anesthesia N/A 8/17/2018    Performed [facility] at [facility] 2ND F[facility]    EXCISION-LESION-FACE Right 6/9/2014    Performed by Roberto Carlos Mcdowell [facility] at [facility] OR 2ND FLR    FISTULOTOMY  8/17/2018    Performed by [facility] at [facility] OR 2ND FLR    HEMORRHOID SURGERY      PEDICLE FLAP Right 6/9/2014    melolabial flap    WLE right cheek melanoma Right 6/9/2014       Review of patient's allergies indicates:  No Known Allergies    Medications:  Continuous Infusions:    Scheduled Meds:   acetaminophen  650 mg Oral Q12H    aspirin  81 mg Oral Daily    cilostazol  100 mg Oral BID    clopidogrel  75 mg Oral Daily    docusate sodium  100 mg Oral BID    ferrous sulfate  325 mg Oral Every other day    hydrALAZINE  10 mg Oral Q12H    levothyroxine  88 mcg Oral Before breakfast    lovastatin  20 mg Oral QHS    memantine  5 mg Oral BID    piperacillin-tazobactam (ZOSYN) IVPB  4.5 g Intravenous Q12H    polyethylene glycol  17 g Oral BID    triamterene-hydrochlorothiazide 37.5-25 mg  1 capsule Oral Daily    vitamin D  1,000 Units Oral Daily     PRN Meds:Dextrose 10% Bolus, Dextrose 10% Bolus, glucagon (human recombinant), glucose, glucose, nitroGLYCERIN, polyethylene glycol, ramelteon, sodium chloride 0.9%    Family History     Problem Relation (Age of Onset)    Cancer Mother, Father, Sister    Hypertension Mother, Father    No Known Problems Maternal Grandmother, Maternal Aunt, Maternal Uncle, Paternal Aunt, Paternal Uncle, Maternal Grandfather, Paternal Grandmother, Paternal Grandfather, Sister        Tobacco Use    Smoking status: Never Smoker    Smokeless tobacco: Never Used    Tobacco comment:  the patient walksabout her house with a walker.she is limited byinstabilityin her left knee. She is Renée's  grandmother.   Substance and  Sexual Activity    Alcohol use: No    Drug use: No    Sexual activity: Never       Review of Systems   Unable to perform ROS: Dementia     Objective:     Vital Signs (Most Recent):  Temp: 97.9 °F (36.6 °C) (09/23/19 0739)  Pulse: (!) 58 (09/23/19 0739)  Resp: 16 (09/23/19 0739)  BP: 128/77 (09/23/19 0739)  SpO2: 96 % (09/23/19 0739) Vital Signs (24h Range):  Temp:  [97.3 °F (36.3 °C)-98.6 °F (37 °C)] 97.9 °F (36.6 °C)  Pulse:  [48-65] 58  Resp:  [16-17] 16  SpO2:  [93 %-99 %] 96 %  BP: ()/(50-77) 128/77     Weight: 48.4 kg (106 lb 11.2 oz)  Body mass index is 20.84 kg/m².    Review of Symptoms    Performance Status: 30    ECOG Performance Status Grade: 4 - Completely disabled    Physical Exam   Constitutional: She is cooperative. She has a sickly appearance.   HENT:   Head: Normocephalic and atraumatic.   [place] bilateral ears.    Eyes: Conjunctivae are normal.   Neck: Neck supple.   Pulmonary/Chest: No respiratory distress.   Abdominal: She exhibits no distension.   Musculoskeletal: She exhibits no edema.   Neurological: She is alert.   Oriented to person.    Skin: Skin is warm and dry.   Psychiatric: She has a normal mood and affect.   Nursing note and vitals reviewed.      CBC:   Recent Labs   Lab 09/22/19  0503   WBC 4.90   HGB 10.7*   HCT 35.0*   *        BMP:  Recent Labs   Lab 09/23/19  0425   GLU 89      K 4.5      CO2 21*   BUN 52*   CREATININE 1.6*   CALCIUM 9.3   MG 2.0     LFT:  Lab Results   Component Value Date    AST 38 09/23/2019    ALKPHOS 61 09/23/2019    BILITOT 0.3 09/23/2019     Albumin:   Albumin   Date Value Ref Range Status   09/23/2019 3.1 (L) 3.5 - 5.2 g/dL Final     Protein:   Total Protein   Date Value Ref Range Status   09/23/2019 5.9 (L) 6.0 - 8.4 g/dL Final     Lactic acid:   No results found for: LACTATE      Advance Care Planning   Advanced Directives::  Living Will: Yes. Copy on chart: Yes  [place]: No  Do Not Resuscitate Status: Yes  Medical Power of  : Yes. Agent's Name: Patricai Rodriguez. Agent's Contact Number:     Decision-Making Capacity: Family answered questions, Patient unable to communicate due to disease severity/cognitive impairment       Living Arrangements: Lives with family        > 50% of 35 min visit spent in chart review, face to face discussion of goals of care,  symptom assessment, coordination of care and emotional support.    Jenny Howard, CNS  Palliative Medicine  [facility]

## 2019-09-23 NOTE — ASSESSMENT & PLAN NOTE
- secondary to ischemic CM  - supportive care as tolerated  - attempted diuresis as Echo showed CVP 8 and CXR with pulm edema; CR now rising, most likely d/t acute event and not lasix.  She is stable right now, and is autodiuresing.  NO need to push diuresis today as her CR continues to rise from acute incident   - will resume GDMT as soon as HDS  - d/c on prn triamterene / hctz

## 2019-09-23 NOTE — PROGRESS NOTES
Patient is ready for discharge. Patient stable alert and oriented. IVs removed. No complaints of pain. Discussed discharge plan. Reviewed medications and side effects, appointments, and answered questions with patient and family.

## 2019-09-23 NOTE — DISCHARGE SUMMARY
[facility] Hospital Medicine  Discharge Summary      Patient Name: Jayda Rendon  MRN: [id number]  Admission Date: 9/20/2019  Hospital Length of Stay: 3 days  Discharge Date and Time:  09/23/2019 2:19 PM  Attending Physician: Ayleen Lucia MD   Discharging Provider: Mckenzie Fay NP  Primary Care Provider: Dolly Grijalva MD  [facility] Medicine Team: [facility] [place] Mckenzie Fay NP    HPI:   89 y/o F with PMH of severe AS 0.8 MG 32, non-rheumatic Mod/ Severe MR, Phtn 49mmHg, HFrEF 30%, CVP 8 (8/19), HTN, hypothyroidism, malignant melanoma, CAD and ICM who presents with CP, diaphoresis and acute weakness.  She was found to have Afib with RVR upon presentation to the ED, at which time they gave her a dose of IV diltiazem and IV furosemide which converted her into a junctional rhythm of 30 bpm and dropped her pressure into the 60s'.  She was found mentating at her normal but confused / dementia baseline.  She has three providers (two nephews and a niece) who are her decision makers. She has an extensive extended family.  She lives with her 98 y/o sister and two nephews.   The family noticed that she was taking longer in the bathroom this morning. They went inside the bathroom and noticed that she was pale and diaphoretic. She reported CP, so she was given a SL NTG 0.3 in the ambulance. EMS noted SBP in the 60s. She was [place] [facility] and given 2L NS,  mg, and IV diltiazem 10 mg for AF with RVR.   In the ED, Hgb 10.4, BNP 2426, troponin 1.3, and CXR c/w pulmonary edema. Her EKG is c/w inferior non-specific ST changes. In August, her ECHO demonstrated an EF of 30%, LAD territory WMA c/w prior large infarction, VINEET 0.8, peak velocity 3.9, mean gradient 32, and moderate to severe MR.     She is a known DNR/ DNI.  She has been steadily declining over the past month, with a 7 lbs weight loss d/t poor appetite/ early satiety.  She has been sleeping more though she is still able to get  around using her walker.  She does not want aggressive measures or procedures and the family is agreeble, however they would like her admitted for management of her acute MI.     * No surgery found *      Hospital Course:   Admitted to hospital medicine with acute NSTEMI and acute decompensated systolic heart failure.  She was found in the ED in a junctional bradycardia that has since transitioned to a slow Afib 30 - 50's, hypotensive systolic 60's with adequate mentation and converstation. She was started on ACS protocol with heparin gtt, plavix and ASA load, now daily. Her troponins have peaked > 50K.  Echo on admit, EF unchanged 30%, Local WMA, Grade II LV DDx, LVH, Mod severe AS 0.65/ MG 28, low stroke volume 30 cc/m2, Anteriorly directed MR, Normal RV fxn, PAP 77mmHg, CVP 8, severe LAE.  She is chest pain free and hemodynamically stable.  PT/OT feels she's at her baseline.  She uses a walker at home.  CR bennett to 1.6 from 1.3 and has stabilized, most likely d/t acute event on Friday when she became severely hypotensive and continues to be severely bradycardic.  Her lungs have cleared up, she is off oxygen and is conversant without dyspnea.  Her pressures have improved to the 130's coupled with severe MR, she was started on a small dose of hydralazine 10mg bid, after adding back her triamterene /hctz since she continued to have pitting peripheral edema. Her pressure seemed to be able to tolerate however dropped to the 90's.  Will send home with prn diuretic dose and continue the Hydralazine 10mg bid.      E coli 1/4 bottles.  She was given 3 days of IV zosyn and transitioned to to augmentin 500mg po bid for 10 days to complete at home.  Family has decided on [facility] home hosp[facility].  If she declines in health d/t this event, they are prepared to not bring her back to the hospital.     Dispo: Home with home hospice      Consults:   Consults (From admission, onward)        Status Ordering Provider     Inpatient  consult to Cardiology  Once     Provider:  (Not yet assigned)    Completed SARA TALAVERA III     Inpatient consult to Palliative Care  Once     Provider:  (Not yet assigned)    Completed IGLESIA WOOD     IP consult to case management  Once     Provider:  (Not yet assigned)    Acknowledged LORENA COMER      Review of Systems   Constitutional: Negative for activity change (resolved), appetite change (resolved) and fatigue (resolved).   HENT: Negative for congestion and trouble swallowing.    Eyes: Negative.    Respiratory: Negative for cough, chest tightness and shortness of breath.    Cardiovascular: Positive for leg swelling. Negative for chest pain and palpitations.   Gastrointestinal: Negative for abdominal distention, abdominal pain, blood in stool, constipation, diarrhea, nausea and vomiting.   Endocrine: Negative for cold intolerance and heat intolerance.   Genitourinary: Negative for dysuria, frequency, hematuria and urgency.   Musculoskeletal: Negative for arthralgias, back pain, myalgias and neck pain.   Skin: Negative for color change, pallor and rash.   Allergic/Immunologic: Negative for immunocompromised state.   Neurological: Positive for weakness. Negative for dizziness, tremors, syncope, light-headedness, numbness and headaches.   Hematological: Does not bruise/bleed easily.   Psychiatric/Behavioral: Positive for confusion (Dementia). Negative for agitation and behavioral problems. The patient is not nervous/anxious.         Dementia    Physical Exam   Constitutional: She appears well-developed and well-nourished. No distress.   HENT:   Head: Normocephalic and atraumatic.   Right Ear: External ear normal.   Left Ear: External ear normal.   Nose: Nose normal.   Mouth/Throat: Oropharynx is clear and moist.   Eyes: Conjunctivae and EOM are normal. Right eye exhibits no discharge. Left eye exhibits no discharge. No scleral icterus.   Neck: Normal range of motion. Neck supple. Hepatojugular  reflux present. No JVD present. No tracheal deviation present. No thyromegaly present.   Cardiovascular: Normal rate, regular rhythm and intact distal pulses. Exam reveals no gallop and no friction rub.   Murmur (AS tight harsh high pitched, Mitral loud blowing systolic) heard.  Pulmonary/Chest: Effort normal. No respiratory distress. She has no wheezes. She has no rales (resolved).   Abdominal: Soft. Bowel sounds are normal. She exhibits no distension. There is no tenderness. There is no guarding.   Musculoskeletal: Normal range of motion. She exhibits edema (bilateral ankle +2). She exhibits no tenderness.   Neurological: She is alert. No cranial nerve deficit or sensory deficit. She exhibits normal muscle tone. Coordination normal.   Skin: Skin is warm and dry. No rash noted. No erythema.   Psychiatric: She has a normal mood and affect. Her behavior is normal. Her speech is tangential. Cognition and memory are impaired. She expresses inappropriate judgment.   [place], dementia/ confused, pleasant    * NSTEMI (non-ST elevated myocardial infarction)  - troponin has trended 1.3 to > 50 K  - ACS protocol initiated for medical management, heparin gtt, ASA and plavix loaded, lipid panel normal so will continue with home mevacor.   - Supportive care, no aggressive measures per family  - DNR/ DNI, hospice upon discharge  - HDS, sbp trending upwards to 120's, HR still 40-50's Afib   - no acute events on the monitor   - tolerating low dose hydralazine 10 mg bid as her bun and CR had been slowly trending up so we have since d/c'ed her two home ARB's. She can f/u with her cardiologist to see if she can tolerate one of her two ARB's in the future.   - PAP are much higher and BNP elevated.  She was given two doses of IV lasix with minimal response, thought she started autodiruesing despite rising BUN/CR.  She has stabilized post cardizem dosing.  Lungs have also cleared.    - will send home with PRN triamterene/hctz half tablet as  before just as needed for worsening sob and peripheral edema.       Paroxysmal atrial fibrillation  - given dose of diltiazem 10mg IVP in ED with resultant conversion to junctional bradycardia 30's and hypotension in the 60's.  - converted to SR to 60's for short period now Afib rosalba 40-50's.   -not a long term candidate for AC as she's high risk fall  - family considered DCCV if she goes back into afib with RVR with knowledge she would need sedation and 30 days of AC post procedure, not recommended at this juncture, severe LAE she would not stay in SR long, they understand      Encounter for palliative care in home hospice  - palliative care team instrumental in caring for our mutual patient      Goals of care, counseling/discussion  - as noted above      E coli bacteremia 1/4 bottles  - s/p 3 days IV zosyn, for presumed bacteremia vs contaminate as she in the bathroom having a bm prior to admit  - will switch to augmenting for 10 more days upon discharge      Palliative care encounter  - [facility] hospice   - DNR/ DNI paperwork on chart      Acute on chronic systolic heart failure  - see above       Acute decompensated heart failure  - secondary to ischemic CM  - supportive care as tolerated  - attempted diuresis as Echo showed CVP 8 and CXR with pulm edema; CR now rising, most likely d/t acute event and not lasix.  She is stable right now, and is autodiuresing.  NO need to push diuresis today as her CR continues to rise from acute incident   - will resume GDMT as soon as HDS  - d/c on prn triamterene / hctz       Mitral regurgitation  - moderate/ severe, tolerated low dose afterload reduction, hydralazine 10mg bid now that b/p's are high enough to tolerate      Nonrheumatic aortic valve stenosis  - VINEET 0.6 and MG 28, EF 30%      Hypothyroidism  - TSH wnl        Hyperlipidemia  - lipid panel adequate on mevacor        Final Active Diagnoses:    Diagnosis Date Noted POA    PRINCIPAL PROBLEM:  NSTEMI  (non-ST elevated myocardial infarction) [I21.4] 09/20/2019 Yes    Paroxysmal atrial fibrillation [I48.0] 09/20/2019 Yes    Encounter for palliative care in home hospice [Z51.5] 09/23/2019 Not Applicable    Goals of care, counseling/discussion [Z71.89]  Not Applicable    E coli bacteremia 1/4 bottles [R78.81] 09/22/2019 Yes    Acute decompensated heart failure [I50.9] 09/20/2019 Yes    Acute on chronic systolic heart failure [I50.23] 09/20/2019 Yes    Palliative care encounter [Z51.5] 09/20/2019 Not Applicable    Nonrheumatic aortic valve stenosis [I35.0] 10/16/2016 Yes    Mitral regurgitation [I34.0] 10/16/2016 Yes    Hypothyroidism [E03.9] 01/17/2014 Yes    Hyperlipidemia [E78.5] 10/02/2012 Yes      Problems Resolved During this Admission:       Discharged Condition: good    Disposition: Hospice/Home    Follow Up:    Patient Instructions:      Ambulatory referral to Cardiology   Referral Priority: Routine Referral Type: Consultation   Referral Reason: Specialty Services Required   Requested Specialty: Cardiology   Number of Visits Requested: 1     Diet Cardiac     Notify your health care provider if you experience any of the following:  temperature >100.4     Notify your health care provider if you experience any of the following:  persistent nausea and vomiting or diarrhea     Notify your health care provider if you experience any of the following:  severe uncontrolled pain     Notify your health care provider if you experience any of the following:  redness, tenderness, or signs of infection (pain, swelling, redness, odor or green/yellow discharge around incision site)     Notify your health care provider if you experience any of the following:  difficulty breathing or increased cough     Notify your health care provider if you experience any of the following:  severe persistent headache     Notify your health care provider if you experience any of the following:  worsening rash     Notify your health  care provider if you experience any of the following:  persistent dizziness, light-headedness, or visual disturbances     Notify your health care provider if you experience any of the following:  increased confusion or weakness     Activity as tolerated       Significant Diagnostic Studies: Labs: All labs within the past 24 hours have been reviewed    Pending Diagnostic Studies:     None         Medications:  Reconciled Home Medications:      Medication List      START taking these medications    amoxicillin-clavulanate 500-125mg 500-125 mg Tab  Commonly known as:  AUGMENTIN  Take 1 tablet (500 mg total) by mouth 2 (two) times daily. for 10 days  Start taking on:  September 24, 2019     hydrALAZINE 10 MG tablet  Commonly known as:  APRESOLINE  Take 1 tablet (10 mg total) by mouth every 12 (twelve) hours.  Start taking on:  September 24, 2019        CHANGE how you take these medications    triamterene-hydrochlorothiazide 37.5-25 mg 37.5-25 mg per tablet  Commonly known as:  MAXZIDE-25  Take 1/2 tab as needed for shortness of breath and worsening fluid retention. If b/p's >110  What changed:  additional instructions        CONTINUE taking these medications    alendronate 70 MG tablet  Commonly known as:  FOSAMAX  TAKE 1 TABLET BY MOUTH ONCE A WEEK     aspirin 81 MG EC tablet  Commonly known as:  ECOTRIN  Take 81 mg by mouth once daily.     CENTRUM SILVER Tab  Generic drug:  multivitamin-minerals-lutein  Take 1 tablet by mouth once daily.     cholecalciferol (vitamin D3) 1,000 unit capsule  Commonly known as:  VITAMIN D3  Take 1,000 Units by mouth once daily.     cilostazol 100 MG Tab  Commonly known as:  PLETAL  Take 1 tablet (100 mg total) by mouth 2 (two) times daily.     clopidogrel 75 mg tablet  Commonly known as:  PLAVIX  Take 1 tablet (75 mg total) by mouth once daily.     docusate sodium 100 MG capsule  Commonly known as:  COLACE  Take 100 mg by mouth once daily.     ferrous sulfate 325 (65 FE) MG EC  tablet  Take 1 tablet (325 mg total) by mouth every other day.     levothyroxine 88 MCG tablet  Commonly known as:  SYNTHROID  Take 1 tablet (88 mcg total) by mouth once daily.     lovastatin 20 MG tablet  Commonly known as:  MEVACOR  TAKE 1 TABLET(20 MG) BY MOUTH EVERY EVENING     memantine 5 MG Tab  Commonly known as:  NAMENDA  Take 1 tablet (5 mg total) by mouth 2 (two) times daily.     MIRALAX ORAL  Take by mouth daily as needed. Takes half dose daily, may increase to full dose if needed     nitroGLYCERIN 0.3 MG SL tablet  Commonly known as:  NITROSTAT  Place 1 tablet (0.3 mg total) under the tongue every 5 (five) minutes as needed for Chest pain.     olmesartan 40 MG tablet  Commonly known as:  BENICAR  Take 1 tablet (40 mg total) by mouth once daily.     TYLENOL ARTHRITIS ORAL  Take 2 tablets by mouth daily as needed.  2 in the morning 2 in the afternoon        STOP taking these medications    losartan 25 MG tablet  Commonly known as:  COZAAR            Indwelling Lines/Drains at time of discharge:   Lines/Drains/Airways     Drain            Female External Urinary Catheter 09/20/19 0830 3 days                Time spent on the discharge of patient: 60minutes  Patient was seen and examined on the date of discharge and determined to be suitable for discharge.         Mckenzie Fay NP  Department of Hospital Medicine  [facility]  Spectra:  [phone]  Pager: [phone]

## 2019-09-23 NOTE — SUBJECTIVE & OBJECTIVE
Past Medical History:   Diagnosis Date    Arthritis     Cancer     melanoma    Hyperlipidemia     Hyperparathyroidism     Hypertension     Joint pain     Peripheral vascular disease     worse on the right than the left    Thyroid disease     hypothyroidism       Past Surgical History:   Procedure Laterality Date    APPENDECTOMY      Exam under anesthesia N/A 8/17/2018    Performed by Jacob[facility]ilo [facility] at [facility] OR [address] [place] [address]    EXCISION-LESION-FACE Right 6/9/2014    [facility]    FISTULOTOMY  8/17/2018    Performed by Jacobo Camilo MD at [facility] OR [address] [place] [address]    HEMORRHOID SURGERY      PEDICLE FLAP Right 6/9/2014    melolabial flap    WLE right cheek melanoma Right 6/9/2014       Review of patient's allergies indicates:  No Known Allergies    Medications:  Continuous Infusions:    Scheduled Meds:   acetaminophen  650 mg Oral Q12H    aspirin  81 mg Oral Daily    cilostazol  100 mg Oral BID    clopidogrel  75 mg Oral Daily    docusate sodium  100 mg Oral BID    ferrous sulfate  325 mg Oral Every other day    hydrALAZINE  10 mg Oral Q12H    levothyroxine  88 mcg Oral Before breakfast    lovastatin  20 mg Oral QHS    memantine  5 mg Oral BID    piperacillin-tazobactam (ZOSYN) IVPB  4.5 g Intravenous Q12H    polyethylene glycol  17 g Oral BID    triamterene-hydrochlorothiazide 37.5-25 mg  1 capsule Oral Daily    vitamin D  1,000 Units Oral Daily     PRN Meds:Dextrose 10% Bolus, Dextrose 10% Bolus, glucagon (human recombinant), glucose, glucose, nitroGLYCERIN, polyethylene glycol, ramelteon, sodium chloride 0.9%    Family History     Problem Relation (Age of Onset)    Cancer Mother, Father, Sister    Hypertension Mother, Father    No Known Problems Maternal Grandmother, Maternal Aunt, Maternal Uncle, Paternal Aunt, Paternal Uncle, Maternal Grandfather, Paternal Grandmother, Paternal Grandfather, Sister        Tobacco Use    Smoking status: Never  Smoker    Smokeless tobacco: Never Used    Tobacco comment:  the patient walksabout her house with a walker.she is limited byinstabilityin her left knee. She is Renée's  grandmother.   Substance and Sexual Activity    Alcohol use: No    Drug use: No    Sexual activity: Never       Review of Systems   Unable to perform ROS: Dementia     Objective:     Vital Signs (Most Recent):  Temp: 97.9 °F (36.6 °C) (09/23/19 0739)  Pulse: (!) 58 (09/23/19 0739)  Resp: 16 (09/23/19 0739)  BP: 128/77 (09/23/19 0739)  SpO2: 96 % (09/23/19 0739) Vital Signs (24h Range):  Temp:  [97.3 °F (36.3 °C)-98.6 °F (37 °C)] 97.9 °F (36.6 °C)  Pulse:  [48-65] 58  Resp:  [16-17] 16  SpO2:  [93 %-99 %] 96 %  BP: ()/(50-77) 128/77     Weight: 48.4 kg (106 lb 11.2 oz)  Body mass index is 20.84 kg/m².    Review of Symptoms    Performance Status: 30    ECOG Performance Status Grade: 4 - Completely disabled    Physical Exam   Constitutional: She is cooperative. She has a sickly appearance.   HENT:   Head: Normocephalic and atraumatic.   [place] bilateral ears.    Eyes: Conjunctivae are normal.   Neck: Neck supple.   Pulmonary/Chest: No respiratory distress.   Abdominal: She exhibits no distension.   Musculoskeletal: She exhibits no edema.   Neurological: She is alert.   Oriented to person.    Skin: Skin is warm and dry.   Psychiatric: She has a normal mood and affect.   Nursing note and vitals reviewed.      CBC:   Recent Labs   Lab 09/22/19  0503   WBC 4.90   HGB 10.7*   HCT 35.0*   *        BMP:  Recent Labs   Lab 09/23/19  0425   GLU 89      K 4.5      CO2 21*   BUN 52*   CREATININE 1.6*   CALCIUM 9.3   MG 2.0     LFT:  Lab Results   Component Value Date    AST 38 09/23/2019    ALKPHOS 61 09/23/2019    BILITOT 0.3 09/23/2019     Albumin:   Albumin   Date Value Ref Range Status   09/23/2019 3.1 (L) 3.5 - 5.2 g/dL Final     Protein:   Total Protein   Date Value Ref Range Status   09/23/2019 5.9 (L) 6.0 - 8.4 g/dL Final      Lactic acid:   No results found for: LACTATE      Advance Care Planning   Advanced Directives::  Living Will: Yes. Copy on chart: Yes  [place]POST: No  Do Not Resuscitate Status: Yes  Medical Power of : Yes. Agent's Name: Patricia Rodriguez. Agent's Contact Number:     Decision-Making Capacity: Family answered questions, Patient unable to communicate due to disease severity/cognitive impairment       Living Arrangements: Lives with family

## 2019-09-23 NOTE — ASSESSMENT & PLAN NOTE
- given dose of diltiazem 10mg IVP in ED with resultant conversion to junctional bradycardia 30's and hypotension in the 60's.  - converted to SR to 60's for short period now Afib rosalba 40-50's.   -not a long term candidate for AC as she's high risk fall  - family considered DCCV if she goes back into afib with RVR with knowledge she would need sedation and 30 days of AC post procedure, not recommended at this juncture, severe LAE she would not stay in SR long, they understand

## 2019-09-23 NOTE — PLAN OF CARE
received consult to arrange home hospice.  Met with pt and family in room to provide a list of hospice agencies.  Family selected [facility].  Referral sent to [facility] via [facility].  They will evaluate pt today.   09/23/19 7002   Post-Acute Status   Post-Acute Authorization Home Health/Hospice   Home Health/Hospice Status Referrals Sent

## 2019-09-23 NOTE — PLAN OF CARE
[facility]  Department of Hospital Medicine  [address] [place] [address]  [phone] after hours  [phone] fax    HOSPICE  ORDERS    09/23/2019    Admit to Hospice:  Home Service     Diagnoses:   Active Hospital Problems    Diagnosis  POA    *NSTEMI (non-ST elevated myocardial infarction) [I21.4]  Yes     Priority: 1 - High    Paroxysmal atrial fibrillation [I48.0]  Yes     Priority: 2     Encounter for palliative care in home hospice [Z51.5]  Not Applicable    Goals of care, counseling/discussion [Z71.89]  Not Applicable    E coli bacteremia 1/4 bottles [R78.81]  Yes    Acute decompensated heart failure [I50.9]  Yes    Acute on chronic systolic heart failure [I50.23]  Yes    Palliative care encounter [Z51.5]  Not Applicable    Nonrheumatic aortic valve stenosis [I35.0]  Yes     Now severe      Mitral regurgitation [I34.0]  Yes     Moderate severe      Hypothyroidism [E03.9]  Yes    Hyperlipidemia [E78.5]  Yes      Resolved Hospital Problems   No resolved problems to display.       Hospice Qualifying Diagnoses:Patient has a life expectancy < 6 months due to: End stage heart failure, CAD, s/p MI with bradycardia and severe hypotension.  She has underlying severe AS and severe MR with an EF of 30% and worsening renal failure         Vital Signs: Routine per Hospice Protocol.    Code Status: DNR/ DNI    Allergies: Review of patient's allergies indicates:  No Known Allergies    Diet: Cardiac 1500ml free water restriction    Activities: As tolerated with walker    Nursing: Per Hospice Routine.      Medications:      Jayda Rendon   Home Medication Instructions MAAME:[id number]    Printed on:09/23/19 6064   Medication Information                      ACETAMINOPHEN (TYLENOL ARTHRITIS ORAL)  Take 2 tablets by mouth daily as needed.  2 in the morning 2 in the afternoon             aspirin (ECOTRIN) 81 MG EC tablet  Take 81 mg by mouth once daily.               cefpodoxime (VANTIN) 200 MG tablet  Take 2 tablets (400 mg total) by mouth once daily.             cholecalciferol, vitamin D3, 1,000 unit capsule  Take 1,000 Units by mouth once daily.              clopidogrel (PLAVIX) 75 mg tablet  Take 1 tablet (75 mg total) by mouth once daily.             docusate sodium (COLACE) 100 MG capsule  Take 100 mg by mouth once daily.             ferrous sulfate 325 (65 FE) MG EC tablet  Take 1 tablet (325 mg total) by mouth every other day.             hydrALAZINE (APRESOLINE) 10 MG tablet  Take 1 tablet (10 mg total) by mouth every 12 (twelve) hours.             levothyroxine (SYNTHROID) 88 MCG tablet  Take 1 tablet (88 mcg total) by mouth once daily.             lovastatin (MEVACOR) 20 MG tablet  TAKE 1 TABLET(20 MG) BY MOUTH EVERY EVENING             memantine (NAMENDA) 5 MG Tab  Take 1 tablet (5 mg total) by mouth 2 (two) times daily.             multivitamin-minerals-lutein (CENTRUM SILVER) Tab  Take 1 tablet by mouth once daily.              nitroGLYCERIN (NITROSTAT) 0.3 MG SL tablet  Place 1 tablet (0.3 mg total) under the tongue every 5 (five) minutes as needed for Chest pain.             polyethylene glycol 3350 (MIRALAX ORAL)  Take by mouth daily as needed. Takes half dose daily, may increase to full dose if needed             triamterene-hydrochlorothiazide 37.5-25 mg (MAXZIDE-25) 37.5-25 mg per tablet  Take 1/2 tab as needed for shortness of breath and worsening fluid retention. If b/p's >110                 Future Orders:  Hospice Medical Director may dictate new orders for comfortable care measures & sign death certificate.        ________________________________Singh Fay NP  09/23/2019

## 2019-09-23 NOTE — PHYSICIAN QUERY
PT Name: Jayda Rendon  MR #: [id number]     Physician Query Form - Diagnosis Clarification      CDS/: Marlene Laboy               Contact information:[email or web address]    This form is a permanent document in the medical record.     Query Date: September 23, 2019    By submitting this query, we are merely seeking further clarification of documentation.  Please utilize your independent clinical judgment when addressing the question(s) below.     The medical record contains the following:      Findings Supporting Clinical Information Location in Medical Record   nontraumatic shock    Persistent hypotension after initial liter of IV fluids.    Palliative care encounter   89-year-old female with frailty/debility, severe aortic stenosis, CHF, presenting in shock likely related to acute MI. Pt has hx of dementia. Pt is alert, oriented to person. Pt is [place].  No distress noted. Pt is a DNR.    ED MD    Palliative medicine pn 9-23     Please clarify if the Nontraumatic shock diagnosis has been:    [  ] Ruled In   [x  ] Ruled Out  Side effect of diltiazem and chemical cardioversion   [  ] Other/Clarification of findings (please specify):     [  ] Clinically undetermined     Please document in your progress notes daily for the duration of treatment, until resolved, and include in your discharge summary.

## 2019-09-23 NOTE — ASSESSMENT & PLAN NOTE
"89-year-old female with frailty/debility, severe aortic stenosis, CHF, presenting in shock likely related to acute MI. Pt has hx of dementia. Pt is alert, oriented to person. Pt is [place]. No distress noted. Pt is a DNR.         Advance care planning:  -The patient has previously engaged in advance care planning  -Living will and HCPOA reviewed, both scanned into Epic  -living will:  Patient would want life-sustaining treatment if doctors think that would help but she would want it.  It would not want to have it of doctors do not think it would help  -Pt has appointed a HCPOA: Patricia Rodriguez  (daughter) [phone]    Estimated prognosis per Dr. Baker on 9-20-19  "-Time and potential for recovery:  Family describes the patient has had a significant decline in her overall health and function in the past month.  They say she has lost 7 lb in last month, has been sleeping all of the time, eating much less.  Given her age and multiple comorbidities it seems that she was showing multiple signs that she was approaching the end of her life and now with this new acute and severe illness, I suspect that she has a prognosis of weeks to months."    Goals of care:  Disease understanding:  Today: Met with pt's family. Patricia present. Family verbalized understanding that pt has  Advanced heart failure and is appropriate for hospice care.  Family had questions about home hospice care. Questions answered. Family verbalized understanding. Family would like to meet with [facility] for home hospice. Pt will be discharging back home with her sister and two nephews.     Plan:   Family interested in meeting with [facility] for home hospice. Spoke to Dr. Lucia and CAYETANO Hobson with .  Will follow.   "

## 2019-09-23 NOTE — ASSESSMENT & PLAN NOTE
- troponin has trended 1.3 to > 50 K  - ACS protocol initiated for medical management, heparin gtt, ASA and plavix loaded, lipid panel normal so will continue with home mevacor.   - Supportive care, no aggressive measures per family  - DNR/ DNI, hospice upon discharge  - HDS, sbp trending upwards to 120's, HR still 40-50's Afib   - no acute events on the monitor   - tolerating low dose hydralazine 10 mg bid as her bun and CR had been slowly trending up so we have since d/c'ed her two home ARB's. She can f/u with her cardiologist to see if she can tolerate one of her two ARB's in the future.   - PAP are much higher and BNP elevated.  She was given two doses of IV lasix with minimal response, thought she started autodiruesing despite rising BUN/CR.  She has stabilized post cardizem dosing.  Lungs have also cleared.    - will send home with PRN triamterene/hctz half tablet as before just as needed for worsening sob and peripheral edema.

## 2019-09-23 NOTE — ASSESSMENT & PLAN NOTE
- moderate/ severe, tolerated low dose afterload reduction, hydralazine 10mg bid now that b/p's are high enough to tolerate

## 2019-09-24 NOTE — PLAN OF CARE
09/24/19 0657   Final Note   Assessment Type Final Discharge Note   Anticipated Discharge Disposition [facility] Follow Up  Appt(s) scheduled? No

## 2019-09-25 LAB — BACTERIA BLD CULT: NORMAL

## 2019-09-26 LAB
BACTERIA BLD CULT: NORMAL
BACTERIA BLD CULT: NORMAL

## 2019-10-01 NOTE — PT/OT/SLP DISCHARGE
Occupational Therapy Discharge Summary    Jayda Rendon  MRN: [id number]   Principal Problem: NSTEMI (non-ST elevated myocardial infarction)      Patient Discharged from acute Occupational Therapy on 9/23/19.  Please refer to prior OT note dated 9/21/19 for functional status.    Assessment:      Patient was discharged unexpectedly.  Information required to complete an accurate discharge summary is unknown.  Refer to therapy initial evaluation and last progress note for initial and most recent functional status and goal achievement.  Recommendations made may be found in medical record.    Objective:     GOALS:   Multidisciplinary Problems     Occupational Therapy Goals     Not on file          Multidisciplinary Problems (Resolved)        Problem: Occupational Therapy Goal    Goal Priority Disciplines Outcome Interventions   Occupational Therapy Goal   (Resolved)     OT, PT/OT Met    Description:  Goals to be met by: 9/29/19     Patient will increase functional independence with ADLs by performing:    UE Dressing with Supervision.  LE Dressing with Supervision.  Grooming while standing with Supervision.  Toileting from toilet with Contact Guard Assistance for hygiene and clothing management.   Rolling to Bilateral with Supervision.   Supine to sit with Supervision.  Step transfer with Contact Guard Assistance using RW.  Toilet transfer to toilet with Contact Guard Assistance using RW.                      Reasons for Discontinuation of Therapy Services  Transfer to alternate level of care.      Plan:     Patient Discharged to: Palliative Care/Hospice    Connie Barillas OT  10/1/2019

## 2019-11-27 NOTE — ASSESSMENT & PLAN NOTE
- palliative care team instrumental in caring for our mutual patient     Pt c/o abd pain and diarrhea since last pm w ttp luq > llq - ? diverticulitis, colitis, enteritis, less concern for uti, stone.  Plan labs, ct abd, stool studies if able, reassess.

## 2024-04-26 NOTE — TELEPHONE ENCOUNTER
Called and spoke w/ Don and Renée.   Looks like pt did have prior infarct in LAD territory. EF decreased to 30%. Discussed whether they would like referral to interventional cardiology. Given age and comorbidity, not sure if this would improve quality of life given known comorbidities - severe dementia and severe AS. Don will discuss among siblings. Will also let me know about palliative care and will probably contact Dr. Sanderson depending on decision.   None needed

## (undated) DEVICE — TRAY MINOR GEN SURG

## (undated) DEVICE — PANTIES FEMININE NAPKIN LG/XLG

## (undated) DEVICE — LUBRICANT SURGILUBE 2 OZ

## (undated) DEVICE — SEE MEDLINE ITEM 157148

## (undated) DEVICE — BRIEF MESH LARGE

## (undated) DEVICE — SEE MEDLINE ITEM 146417

## (undated) DEVICE — ELECTRODE REM PLYHSV RETURN 9

## (undated) DEVICE — TAPE SILK 3IN

## (undated) DEVICE — SYR ONLY LUER LOCK 20CC

## (undated) DEVICE — COVER LIGHT HANDLE 80/CA

## (undated) DEVICE — NDL 22GA X1 1/2 REG BEVEL

## (undated) DEVICE — GOWN SMART IMP BREATHABLE XXLG

## (undated) DEVICE — SUT VICRYL 3-0 27 SH